# Patient Record
Sex: MALE | Race: WHITE | NOT HISPANIC OR LATINO | Employment: FULL TIME | URBAN - METROPOLITAN AREA
[De-identification: names, ages, dates, MRNs, and addresses within clinical notes are randomized per-mention and may not be internally consistent; named-entity substitution may affect disease eponyms.]

---

## 2017-01-25 PROBLEM — F41.9 ANXIETY: Status: ACTIVE | Noted: 2017-01-25

## 2017-03-07 PROBLEM — F41.9 ANXIETY: Chronic | Status: ACTIVE | Noted: 2017-01-25

## 2020-04-20 PROBLEM — F10.939 ALCOHOL WITHDRAWAL: Status: ACTIVE | Noted: 2020-04-20

## 2020-04-20 PROBLEM — E87.6 HYPOKALEMIA: Status: ACTIVE | Noted: 2020-04-20

## 2020-04-20 PROBLEM — K44.9 HIATAL HERNIA: Status: ACTIVE | Noted: 2020-04-20

## 2020-04-20 PROBLEM — F41.9 ANXIETY: Status: ACTIVE | Noted: 2020-04-20

## 2020-04-20 PROBLEM — F41.8 OTHER SPECIFIED ANXIETY DISORDERS: Chronic | Status: ACTIVE | Noted: 2017-01-25

## 2020-04-20 PROBLEM — D62 ACUTE BLOOD LOSS ANEMIA: Status: ACTIVE | Noted: 2020-04-20

## 2020-04-20 PROBLEM — K92.2 UPPER GI BLEED: Status: ACTIVE | Noted: 2020-04-20

## 2020-06-14 ENCOUNTER — HOSPITAL ENCOUNTER (EMERGENCY)
Facility: HOSPITAL | Age: 40
Discharge: HOME | End: 2020-06-14
Attending: EMERGENCY MEDICINE
Payer: COMMERCIAL

## 2020-06-14 VITALS
OXYGEN SATURATION: 98 % | RESPIRATION RATE: 20 BRPM | TEMPERATURE: 98 F | DIASTOLIC BLOOD PRESSURE: 74 MMHG | HEART RATE: 93 BPM | SYSTOLIC BLOOD PRESSURE: 139 MMHG

## 2020-06-14 DIAGNOSIS — K92.0 HEMATEMESIS: ICD-10-CM

## 2020-06-14 DIAGNOSIS — F10.939 WITHDRAWAL SYMPTOMS, ALCOHOL: ICD-10-CM

## 2020-06-14 LAB
ALBUMIN SERPL BCP-MCNC: 4.5 G/DL (ref 3.5–5.2)
ALP SERPL-CCNC: 206 U/L (ref 55–135)
ALT SERPL W/O P-5'-P-CCNC: 109 U/L (ref 10–44)
ANION GAP SERPL CALC-SCNC: 23 MMOL/L (ref 8–16)
AST SERPL-CCNC: 316 U/L (ref 10–40)
BASOPHILS # BLD AUTO: 0.07 K/UL (ref 0–0.2)
BASOPHILS NFR BLD: 0.9 % (ref 0–1.9)
BILIRUB SERPL-MCNC: 4.1 MG/DL (ref 0.1–1)
BUN SERPL-MCNC: 10 MG/DL (ref 6–30)
BUN SERPL-MCNC: 11 MG/DL (ref 6–20)
CALCIUM SERPL-MCNC: 9.3 MG/DL (ref 8.7–10.5)
CHLORIDE SERPL-SCNC: 94 MMOL/L (ref 95–110)
CHLORIDE SERPL-SCNC: 97 MMOL/L (ref 95–110)
CO2 SERPL-SCNC: 20 MMOL/L (ref 23–29)
CREAT SERPL-MCNC: 0.6 MG/DL (ref 0.5–1.4)
CREAT SERPL-MCNC: 0.7 MG/DL (ref 0.5–1.4)
DIFFERENTIAL METHOD: ABNORMAL
EOSINOPHIL # BLD AUTO: 0 K/UL (ref 0–0.5)
EOSINOPHIL NFR BLD: 0.1 % (ref 0–8)
ERYTHROCYTE [DISTWIDTH] IN BLOOD BY AUTOMATED COUNT: 16 % (ref 11.5–14.5)
EST. GFR  (AFRICAN AMERICAN): >60 ML/MIN/1.73 M^2
EST. GFR  (NON AFRICAN AMERICAN): >60 ML/MIN/1.73 M^2
ETHANOL SERPL-MCNC: 122 MG/DL
GLUCOSE SERPL-MCNC: 117 MG/DL (ref 70–110)
GLUCOSE SERPL-MCNC: 231 MG/DL (ref 70–110)
HCT VFR BLD AUTO: 34.6 % (ref 40–54)
HCT VFR BLD CALC: 32 %PCV (ref 36–54)
HGB BLD-MCNC: 10.9 G/DL (ref 14–18)
IMM GRANULOCYTES # BLD AUTO: 0.02 K/UL (ref 0–0.04)
IMM GRANULOCYTES NFR BLD AUTO: 0.3 % (ref 0–0.5)
LIPASE SERPL-CCNC: 50 U/L (ref 4–60)
LYMPHOCYTES # BLD AUTO: 0.9 K/UL (ref 1–4.8)
LYMPHOCYTES NFR BLD: 11.9 % (ref 18–48)
MAGNESIUM SERPL-MCNC: 1.7 MG/DL (ref 1.6–2.6)
MCH RBC QN AUTO: 26.9 PG (ref 27–31)
MCHC RBC AUTO-ENTMCNC: 31.5 G/DL (ref 32–36)
MCV RBC AUTO: 85 FL (ref 82–98)
MONOCYTES # BLD AUTO: 0.6 K/UL (ref 0.3–1)
MONOCYTES NFR BLD: 8.2 % (ref 4–15)
NEUTROPHILS # BLD AUTO: 5.8 K/UL (ref 1.8–7.7)
NEUTROPHILS NFR BLD: 78.6 % (ref 38–73)
NRBC BLD-RTO: 0 /100 WBC
PLATELET # BLD AUTO: 87 K/UL (ref 150–350)
PMV BLD AUTO: 9.1 FL (ref 9.2–12.9)
POC IONIZED CALCIUM: 1.02 MMOL/L (ref 1.06–1.42)
POC TCO2 (MEASURED): 23 MMOL/L (ref 23–29)
POTASSIUM BLD-SCNC: 3.4 MMOL/L (ref 3.5–5.1)
POTASSIUM SERPL-SCNC: 3.5 MMOL/L (ref 3.5–5.1)
PROT SERPL-MCNC: 9.2 G/DL (ref 6–8.4)
RBC # BLD AUTO: 4.05 M/UL (ref 4.6–6.2)
SAMPLE: ABNORMAL
SODIUM BLD-SCNC: 135 MMOL/L (ref 136–145)
SODIUM SERPL-SCNC: 137 MMOL/L (ref 136–145)
WBC # BLD AUTO: 7.41 K/UL (ref 3.9–12.7)

## 2020-06-14 PROCEDURE — 83690 ASSAY OF LIPASE: CPT

## 2020-06-14 PROCEDURE — 25000003 PHARM REV CODE 250: Performed by: PHYSICIAN ASSISTANT

## 2020-06-14 PROCEDURE — 93010 ELECTROCARDIOGRAM REPORT: CPT | Mod: ,,, | Performed by: INTERNAL MEDICINE

## 2020-06-14 PROCEDURE — 93005 ELECTROCARDIOGRAM TRACING: CPT

## 2020-06-14 PROCEDURE — 63600175 PHARM REV CODE 636 W HCPCS: Performed by: EMERGENCY MEDICINE

## 2020-06-14 PROCEDURE — 80053 COMPREHEN METABOLIC PANEL: CPT

## 2020-06-14 PROCEDURE — 99285 EMERGENCY DEPT VISIT HI MDM: CPT | Mod: ,,, | Performed by: EMERGENCY MEDICINE

## 2020-06-14 PROCEDURE — 80047 BASIC METABLC PNL IONIZED CA: CPT | Mod: 59

## 2020-06-14 PROCEDURE — 96375 TX/PRO/DX INJ NEW DRUG ADDON: CPT

## 2020-06-14 PROCEDURE — 99285 PR EMERGENCY DEPT VISIT,LEVEL V: ICD-10-PCS | Mod: ,,, | Performed by: EMERGENCY MEDICINE

## 2020-06-14 PROCEDURE — 96365 THER/PROPH/DIAG IV INF INIT: CPT

## 2020-06-14 PROCEDURE — 93010 EKG 12-LEAD: ICD-10-PCS | Mod: ,,, | Performed by: INTERNAL MEDICINE

## 2020-06-14 PROCEDURE — 25000003 PHARM REV CODE 250: Performed by: EMERGENCY MEDICINE

## 2020-06-14 PROCEDURE — 96367 TX/PROPH/DG ADDL SEQ IV INF: CPT

## 2020-06-14 PROCEDURE — 83735 ASSAY OF MAGNESIUM: CPT

## 2020-06-14 PROCEDURE — 96361 HYDRATE IV INFUSION ADD-ON: CPT

## 2020-06-14 PROCEDURE — 96366 THER/PROPH/DIAG IV INF ADDON: CPT

## 2020-06-14 PROCEDURE — 99285 EMERGENCY DEPT VISIT HI MDM: CPT | Mod: 25

## 2020-06-14 PROCEDURE — 80320 DRUG SCREEN QUANTALCOHOLS: CPT

## 2020-06-14 PROCEDURE — 85025 COMPLETE CBC W/AUTO DIFF WBC: CPT

## 2020-06-14 RX ORDER — ONDANSETRON 4 MG/1
4 TABLET, FILM COATED ORAL EVERY 6 HOURS PRN
Qty: 12 TABLET | Refills: 0 | Status: SHIPPED | OUTPATIENT
Start: 2020-06-14 | End: 2021-07-21

## 2020-06-14 RX ORDER — THIAMINE HCL 100 MG
100 TABLET ORAL
Status: COMPLETED | OUTPATIENT
Start: 2020-06-14 | End: 2020-06-14

## 2020-06-14 RX ORDER — CHLORDIAZEPOXIDE HYDROCHLORIDE 25 MG/1
50 CAPSULE, GELATIN COATED ORAL
Status: COMPLETED | OUTPATIENT
Start: 2020-06-14 | End: 2020-06-14

## 2020-06-14 RX ORDER — DIAZEPAM 10 MG/2ML
10 INJECTION INTRAMUSCULAR
Status: COMPLETED | OUTPATIENT
Start: 2020-06-14 | End: 2020-06-14

## 2020-06-14 RX ORDER — DEXTROSE MONOHYDRATE AND SODIUM CHLORIDE 5; .9 G/100ML; G/100ML
1000 INJECTION, SOLUTION INTRAVENOUS
Status: COMPLETED | OUTPATIENT
Start: 2020-06-14 | End: 2020-06-14

## 2020-06-14 RX ORDER — ONDANSETRON 2 MG/ML
4 INJECTION INTRAMUSCULAR; INTRAVENOUS
Status: COMPLETED | OUTPATIENT
Start: 2020-06-14 | End: 2020-06-14

## 2020-06-14 RX ADMIN — DIAZEPAM 10 MG: 5 INJECTION, SOLUTION INTRAMUSCULAR; INTRAVENOUS at 11:06

## 2020-06-14 RX ADMIN — ONDANSETRON HYDROCHLORIDE 4 MG: 2 SOLUTION INTRAMUSCULAR; INTRAVENOUS at 11:06

## 2020-06-14 RX ADMIN — CHLORDIAZEPOXIDE HYDROCHLORIDE 50 MG: 25 CAPSULE ORAL at 02:06

## 2020-06-14 RX ADMIN — Medication 100 MG: at 02:06

## 2020-06-14 RX ADMIN — SODIUM CHLORIDE 1000 ML: 0.9 INJECTION, SOLUTION INTRAVENOUS at 11:06

## 2020-06-14 RX ADMIN — DEXTROSE AND SODIUM CHLORIDE 1000 ML: 5; .9 INJECTION, SOLUTION INTRAVENOUS at 12:06

## 2020-06-14 RX ADMIN — THIAMINE HYDROCHLORIDE 100 MG: 100 INJECTION, SOLUTION INTRAMUSCULAR; INTRAVENOUS at 12:06

## 2020-06-14 NOTE — ED PROVIDER NOTES
Encounter Date: 6/14/2020       History     Chief Complaint   Patient presents with    Withdrawals     Pt with alcohol withdrawal.  Last drink of vodka this am.  + vomiting, shakes.     Patient is a 39-year-old male with past medical history of alcohol abuse who presents to the emergency department with complaint of alcohol withdrawal.  He reports tachycardia, nausea, 3 episodes of hematemesis, fatigue, and tremors that began yesterday. He reports drinking about one fifth of vodka daily for about 2 years now.  He reports that he would like to start the process to detox from alcohol.  He drink about half a 5th of alcohol yesterday.  Symptoms started last night.  He reports feeling very anxious.  Denies history of seizures from withdrawal in the past.  Reports that his typical withdrawal symptoms are anxiety and tremors.  He denies chest pain, shortness of breath, fevers, chills, cough, diarrhea, melena.  He has not taken medication for his symptoms at home.  Denies worsening or alleviating factors.        Review of patient's allergies indicates:  No Known Allergies  Past Medical History:   Diagnosis Date    Alcohol abuse     Anxiety     Hiatal hernia     Panic attack      Past Surgical History:   Procedure Laterality Date    ESOPHAGOGASTRODUODENOSCOPY N/A 4/21/2020    Procedure: EGD (ESOPHAGOGASTRODUODENOSCOPY);  Surgeon: Aakash Lugo MD;  Location: [facility];  Service: Endoscopy;  Laterality: N/A;    NASAL ENDOSCOPY       Family History   Problem Relation Age of Onset    No Known Problems Mother     No Known Problems Father     Cancer Maternal Grandmother         colon     Social History     Tobacco Use    Smoking status: Never Smoker    Smokeless tobacco: Never Used   Substance Use Topics    Alcohol use: Yes     Alcohol/week: 5.0 standard drinks     Types: 5 Shots of liquor per week     Frequency: Monthly or less     Drinks per session: 1 or 2     Binge frequency: Less than monthly     Comment:  pt drinks about 4-5 oz glasses of vodka/day    Drug use: Not on file     Review of Systems   Constitutional: Positive for fatigue. Negative for chills and fever.   HENT: Negative for congestion and sore throat.    Eyes: Negative for visual disturbance.   Respiratory: Negative for cough and shortness of breath.    Cardiovascular: Negative for chest pain.   Gastrointestinal: Positive for nausea and vomiting. Negative for abdominal pain and diarrhea.   Genitourinary: Negative for dysuria.   Musculoskeletal: Negative for back pain.   Skin: Negative for rash.   Neurological: Positive for weakness. Negative for headaches.   Hematological: Does not bruise/bleed easily.   Psychiatric/Behavioral: The patient is nervous/anxious.        Physical Exam     Initial Vitals [06/14/20 1035]   BP Pulse Resp Temp SpO2   (!) 161/92 (!) 120 20 98.2 °F (36.8 °C) 98 %      MAP       --         Physical Exam    Nursing note and vitals reviewed.  Constitutional: He appears well-developed and well-nourished. He is not diaphoretic. No distress.   Anxious appearing white male in no apparent distress.  Slightly tremulous upper extremities   HENT:   Head: Normocephalic and atraumatic.   Mouth/Throat: Mucous membranes are dry.   Eyes: Conjunctivae and EOM are normal. Pupils are equal, round, and reactive to light.   Neck: Normal range of motion. Neck supple.   Cardiovascular: Regular rhythm, normal heart sounds and intact distal pulses. Tachycardia present.  Exam reveals no gallop and no friction rub.    No murmur heard.  Pulmonary/Chest: Breath sounds normal. He has no wheezes. He has no rhonchi. He has no rales.   Abdominal: Soft. Bowel sounds are normal. There is no abdominal tenderness.   Musculoskeletal: Normal range of motion.   Neurological: He is alert and oriented to person, place, and time. He has normal strength. No cranial nerve deficit or sensory deficit. GCS score is 15. GCS eye subscore is 4. GCS verbal subscore is 5. GCS motor  subscore is 6.   Skin: Skin is warm and dry. Capillary refill takes less than 2 seconds.   Psychiatric: He has a normal mood and affect. His behavior is normal. Judgment and thought content normal.         ED Course   Procedures  Labs Reviewed   CBC W/ AUTO DIFFERENTIAL - Abnormal; Notable for the following components:       Result Value    RBC 4.05 (*)     Hemoglobin 10.9 (*)     Hematocrit 34.6 (*)     Mean Corpuscular Hemoglobin 26.9 (*)     Mean Corpuscular Hemoglobin Conc 31.5 (*)     RDW 16.0 (*)     Platelets 87 (*)     MPV 9.1 (*)     Lymph # 0.9 (*)     Gran% 78.6 (*)     Lymph% 11.9 (*)     All other components within normal limits   COMPREHENSIVE METABOLIC PANEL - Abnormal; Notable for the following components:    Chloride 94 (*)     CO2 20 (*)     Glucose 117 (*)     Total Protein 9.2 (*)     Total Bilirubin 4.1 (*)     Alkaline Phosphatase 206 (*)      (*)      (*)     Anion Gap 23 (*)     All other components within normal limits   ALCOHOL,MEDICAL (ETHANOL) - Abnormal; Notable for the following components:    Alcohol, Medical, Serum 122 (*)     All other components within normal limits   ISTAT PROCEDURE - Abnormal; Notable for the following components:    POC Glucose 231 (*)     POC Sodium 135 (*)     POC Potassium 3.4 (*)     POC Ionized Calcium 1.02 (*)     POC Hematocrit 32 (*)     All other components within normal limits   MAGNESIUM   LIPASE   ISTAT CHEM8     EKG Readings: (Independently Interpreted)   Initial Reading: No STEMI. Rhythm: Sinus Tachycardia. Heart Rate: 105.       Imaging Results          X-Ray Chest 1 View (Final result)  Result time 06/14/20 11:37:38    Final result by Loc Muniz MD (06/14/20 11:37:38)                 Impression:      As above.      Electronically signed by: Loc Muniz MD  Date:    06/14/2020  Time:    11:37             Narrative:    EXAMINATION:  XR CHEST 1 VIEW    CLINICAL HISTORY:  Hematemesis    TECHNIQUE:  Single frontal view of the chest  was performed.    COMPARISON:  None    FINDINGS:  No consolidation, pleural effusion or pneumothorax.  Cardiomediastinal silhouette is unremarkable.                              X-Rays:   Independently Interpreted Readings:   Chest X-Ray: Normal heart size.  No infiltrates.  No acute abnormalities.     Medical Decision Making:   History:   Old Medical Records: I decided to obtain old medical records.  Initial Assessment:   Emergent evaluation of a 39-year-old male who presents to the emergency department with complaints of alcohol withdrawal, anxiety, nausea, hematemesis, tremors that began last night.  Patient typically drinks one fifth of vodka daily however only drink a half a fifth of vodka last night.  Patient is afebrile, tachycardic, hypertensive, and anxious appearing.  Will order labs, EKG, chest x-ray, IV fluids, Valium, thiamine, Zofran and continue to monitor.  Differential Diagnosis:   Differential diagnosis includes but is not limited to Sneha-Chaudhry tear, Boerhaave syndrome, alcohol withdrawal, alcohol dependence, electrolyte abnormality, cardiac dysrhythmia, dehydration.  Independently Interpreted Test(s):   I have ordered and independently interpreted X-rays - see prior notes.  I have ordered and independently interpreted EKG Reading(s) - see prior notes  Clinical Tests:   Lab Tests: Ordered and Reviewed  Radiological Study: Ordered and Reviewed  Medical Tests: Ordered and Reviewed  ED Management:  CMP reveals elevation in total bili to 4.1 although this is near patient's baseline.  Also elevation in LFTs however the seem to be chronic.  Patient does have an anion gap of 23.  He is receiving 1 L of normal saline.  Will follow with another L of D5.  Repeat i-STAT reveals that anion gap has closed.  Patient reports significant improvement in symptoms.  He is no longer tremulous or tachycardic.  His mother was here with him on initial evaluation however has gone outside to wait with her .   Patient's parents are researching rehabilitation centers.  Our  in the emergency department has also attempted to find inpatient placement however no facilities in the area are able to take the patient until tomorrow.  I do feel that patient is stable for discharge and does not need to be admitted to the hospital at this time.  Will give a dose of Librium in the emergency department.  Will also discharge with a prescription for Zofran as needed for nausea.  Patient once voluntary admission however will do so tomorrow.  He is given strict return precautions. The patient was instructed to follow up with a primary care provider/seek voluntary admission for alcohol detox or to return to the emergency department for worsening symptoms. The treatment plan was discussed with the patient who demonstrated understanding and comfort with plan. The patient's history, physical exam, and plan of care was discussed with and agreed upon with my supervising physician.                    ED Course as of Jun 14 1518   Sun Jun 14, 2020   1205 WBC: 7.41 [JM]   1205 Hemoglobin(!): 10.9 [JM]   1205 Hematocrit(!): 34.6 [JM]   1205 Platelets(!): 87 [JM]   1205 Sodium: 137 [JM]   1205 Potassium: 3.5 [JM]   1206 Glucose(!): 117 [JM]   1206 BUN, Bld: 11 [JM]   1206 Creatinine: 0.7 [JM]   1206 Alcohol, Medical, Serum(!): 122 [JM]   1206 Lipase: 50 [JM]   1206 Magnesium: 1.7 [JM]      ED Course User Index  [JM] Linnette Palacios PA-C                Clinical Impression:     1. Withdrawal symptoms, alcohol    2. Hematemesis                ED Disposition Condition    Discharge Stable        ED Prescriptions     Medication Sig Dispense Start Date End Date Auth. Provider    ondansetron (ZOFRAN) 4 MG tablet Take 1 tablet (4 mg total) by mouth every 6 (six) hours as needed for Nausea. 12 tablet 6/14/2020  Linnette Palacios PA-C        Follow-up Information     Follow up With Specialties Details Why Contact Info    Wan Herrera II,  MD Internal Medicine Schedule an appointment as soon as possible for a visit in 3 days  [address]  [facility] [address]  [phone]      [facility] Emergency Medicine Go to  If symptoms worsen [address]  [facility] [id number] [phone]                                     Linnette Palacios PA-C  06/14/20 1516

## 2020-06-14 NOTE — DISCHARGE INSTRUCTIONS
We are unable to place you in an inpatient detox program today.  We will give you information for different programs in the area.  Take Zofran as needed for nausea.  Please check into one of these facilities for further management or return to the emergency department for new or worsening symptoms.

## 2020-06-14 NOTE — ED TRIAGE NOTES
"Reports ETOH abuse and trying to stop drinking. Last drink this am at 0900 a "glass" of vodka. Reports having tremors, elevated HR and n/v x 2 days  "

## 2020-07-30 ENCOUNTER — LAB VISIT (OUTPATIENT)
Dept: PRIMARY CARE CLINIC | Facility: OTHER | Age: 40
End: 2020-07-30
Attending: INTERNAL MEDICINE
Payer: COMMERCIAL

## 2020-07-30 DIAGNOSIS — Z11.59 SPECIAL SCREENING EXAMINATION FOR UNSPECIFIED VIRAL DISEASE: Primary | ICD-10-CM

## 2020-07-30 PROCEDURE — U0003 INFECTIOUS AGENT DETECTION BY NUCLEIC ACID (DNA OR RNA); SEVERE ACUTE RESPIRATORY SYNDROME CORONAVIRUS 2 (SARS-COV-2) (CORONAVIRUS DISEASE [COVID-19]), AMPLIFIED PROBE TECHNIQUE, MAKING USE OF HIGH THROUGHPUT TECHNOLOGIES AS DESCRIBED BY CMS-2020-01-R: HCPCS

## 2020-08-03 LAB — SARS-COV-2 RNA RESP QL NAA+PROBE: NORMAL

## 2020-08-19 ENCOUNTER — LAB VISIT (OUTPATIENT)
Dept: PRIMARY CARE CLINIC | Facility: OTHER | Age: 40
End: 2020-08-19
Attending: INTERNAL MEDICINE
Payer: COMMERCIAL

## 2020-08-19 DIAGNOSIS — Z11.59 SPECIAL SCREENING EXAMINATION FOR UNSPECIFIED VIRAL DISEASE: ICD-10-CM

## 2020-08-19 PROCEDURE — U0003 INFECTIOUS AGENT DETECTION BY NUCLEIC ACID (DNA OR RNA); SEVERE ACUTE RESPIRATORY SYNDROME CORONAVIRUS 2 (SARS-COV-2) (CORONAVIRUS DISEASE [COVID-19]), AMPLIFIED PROBE TECHNIQUE, MAKING USE OF HIGH THROUGHPUT TECHNOLOGIES AS DESCRIBED BY CMS-2020-01-R: HCPCS

## 2020-08-20 ENCOUNTER — LAB VISIT (OUTPATIENT)
Dept: PRIMARY CARE CLINIC | Facility: OTHER | Age: 40
End: 2020-08-20
Attending: INTERNAL MEDICINE
Payer: COMMERCIAL

## 2020-08-20 DIAGNOSIS — Z11.59 SPECIAL SCREENING EXAMINATION FOR UNSPECIFIED VIRAL DISEASE: Primary | ICD-10-CM

## 2020-08-20 PROCEDURE — U0003 INFECTIOUS AGENT DETECTION BY NUCLEIC ACID (DNA OR RNA); SEVERE ACUTE RESPIRATORY SYNDROME CORONAVIRUS 2 (SARS-COV-2) (CORONAVIRUS DISEASE [COVID-19]), AMPLIFIED PROBE TECHNIQUE, MAKING USE OF HIGH THROUGHPUT TECHNOLOGIES AS DESCRIBED BY CMS-2020-01-R: HCPCS

## 2020-08-21 LAB — SARS-COV-2 RNA RESP QL NAA+PROBE: NOT DETECTED

## 2020-08-22 LAB — SARS-COV-2 RNA RESP QL NAA+PROBE: NORMAL

## 2021-05-10 ENCOUNTER — PATIENT MESSAGE (OUTPATIENT)
Dept: RESEARCH | Facility: HOSPITAL | Age: 41
End: 2021-05-10

## 2021-07-19 ENCOUNTER — OFFICE VISIT (OUTPATIENT)
Dept: DERMATOLOGY | Facility: CLINIC | Age: 41
End: 2021-07-19
Payer: COMMERCIAL

## 2021-07-19 DIAGNOSIS — Z76.89 ENCOUNTER FOR SKIN CARE: Primary | ICD-10-CM

## 2021-07-19 DIAGNOSIS — L21.9 SEBORRHEA: ICD-10-CM

## 2021-07-19 PROCEDURE — 99203 OFFICE O/P NEW LOW 30 MIN: CPT | Mod: 95,,, | Performed by: DERMATOLOGY

## 2021-07-19 PROCEDURE — 99203 PR OFFICE/OUTPT VISIT, NEW, LEVL III, 30-44 MIN: ICD-10-PCS | Mod: 95,,, | Performed by: DERMATOLOGY

## 2021-07-19 RX ORDER — KETOCONAZOLE 20 MG/G
CREAM TOPICAL 2 TIMES DAILY
Qty: 45 G | Refills: 2 | Status: SHIPPED | OUTPATIENT
Start: 2021-07-19 | End: 2022-02-16 | Stop reason: ALTCHOICE

## 2023-06-23 ENCOUNTER — PATIENT MESSAGE (OUTPATIENT)
Dept: PSYCHIATRY | Facility: CLINIC | Age: 43
End: 2023-06-23
Payer: COMMERCIAL

## 2023-07-19 PROBLEM — F33.1 MODERATE EPISODE OF RECURRENT MAJOR DEPRESSIVE DISORDER: Status: ACTIVE | Noted: 2023-07-19

## 2023-07-31 ENCOUNTER — PATIENT MESSAGE (OUTPATIENT)
Dept: PSYCHIATRY | Facility: CLINIC | Age: 43
End: 2023-07-31
Payer: COMMERCIAL

## 2023-07-31 ENCOUNTER — TELEPHONE (OUTPATIENT)
Dept: PSYCHIATRY | Facility: CLINIC | Age: 43
End: 2023-07-31
Payer: COMMERCIAL

## 2023-07-31 NOTE — TELEPHONE ENCOUNTER
Called patient to schedule NP for med [facility] in [place]   No answer    Left a voicemail and sent mychart message.

## 2023-08-04 ENCOUNTER — TELEPHONE (OUTPATIENT)
Dept: PSYCHIATRY | Facility: CLINIC | Age: 43
End: 2023-08-04
Payer: COMMERCIAL

## 2023-08-04 ENCOUNTER — PATIENT MESSAGE (OUTPATIENT)
Dept: PSYCHIATRY | Facility: CLINIC | Age: 43
End: 2023-08-04
Payer: COMMERCIAL

## 2023-08-04 NOTE — PROGRESS NOTES
"Outpatient Psychiatry Initial Visit  08/08/2023    ID:  41 yo M presenting for an initial evaluation. Met with patient.    Reason for encounter: Referral from MELVIN Cramer. Patient complains of anxiety/depression.    History of Present Illness:  Pt. is a 41 yo M with a past psychiatric hx of Anxiety, Panic attack, Alcohol abuse, Moderate episode of recurrent major depressive disorder presenting to the clinic for an initial evaluation and treatment. Past medical history outlined below. He is currently taking ALPRAZolam (XANAX), paroxetine (PAXIL), prescribed and managed by Dr. Herrera. He reports that these medications have not worked to effectively treat his depression/anxiety. He reports worsening depression and has not felt that the Paxil has worked, states he has been on it for 7 years and would like to try a different medication. Was prescribed Wellbutrin recently in addition to Paxil and stated that it worsened his anxiety significantly, reported that he stopped taking it. States that the Xanax helps, does not report any panic attacks, but continues to experience s/s of anxiety. Admits that he has had anxiety a good part of his life and "deals with it", but believes that it is adding to his depression currently. Reports good sleep, "it is thankfully good, and probably the only relief I get". Denies any SI or thoughts of self harm, denies HI/AVH, denies any suicide attempts in the past or psych IP admissions. "The depression lately is what worries me the most, I have tried the online therapy but I would like to see someone for therapy to help".      Pt currently endorses or denies the following symptoms:  Psych ROS:  Depression: worsening 7 out of 10, denies SI, doesn't think his Paxil is working  Anxiety: no panic attacks, no agoraphobia, no social anxiety, chronic S/S anxiety  PTSD: no flashbacks, nightmares, or avoidance of stimuli  Melony/Psychosis: No manic episodes, no A/V hallucinations  SI - No SI - " access to guns: denies    Past Psychiatric History:  Past Psych Hx:  anxiety/depression, alcoholism First psych contact: 2000  Prior hospitalizations: denies  Prior suicide attempts or self harm: denies  Prior diagnosis: anxiety/depression  Prior meds: Wellbutrin, Ambien, Buspar (short period), Trileptal  Current meds: Paxil, Xanax  Prior psychotherapy: online last year, would like to restart      Past Medical Hx: Hx of TBI?   denies   Hx of seizures? denies  Past Medical History:   Diagnosis Date    Alcohol abuse     Anxiety     Hiatal hernia     Panic attack              Past Surgical Hx:  Past Surgical History:   Procedure Laterality Date    ESOPHAGOGASTRODUODENOSCOPY N/A 4/21/2020    Procedure: EGD (ESOPHAGOGASTRODUODENOSCOPY);  Surgeon: Aakash Lugo MD;  Location: [facility];  Service: Endoscopy;  Laterality: N/A;    NASAL ENDOSCOPY             Family Hx:   Paternal: denies  Maternal: denies            Social Hx:   Childhood: good  Marital Status:   Children: denies  Resides: alone  Occupation: [employer]  Hobbies: sports, friends  Faith: denies  Education level: Masters degree  : denies  Legal: denies    Substance Hx:  Tobacco: denies  Alcohol: denies (in recovery since 2020)  Drug use: denies  Caffeine: 1 cup a day  Rehab: 2020 alcohol  Prior/current AA: currently    Review of Symptoms  GENERAL: no weight gain/loss  SKIN: no rashes or lacerations  HEAD: no headaches  EYES: no jaundice, blindness. No exophthalmos  EARS: no dizziness, tinnitus, or hearing loss  NOSE: no changes in smell  Mouth/throat: no dyskinetic movements or obvious goiter  CHEST: no SOB, hyperventilation or cough  CARDIO: no tachycardia, bradycardia, or chest pain  ABDOMEN: no nausea, vomiting, pain, constipation, or diarrhea  URINARY:  no frequency, dysuria, or sexual dysfunction  ENDOCRINE: No polydipsia, polyuria, no cold/hot intolerance  MUSCULOSKELETAL: no joint pain/stiffness  NEUROLOGIC: no weakness or sensory  "changes, no seizures, no confusion, memory loss, or forgetfulness, no tremor or abnormal movements    Current Evaluation:  Nutritional Screening:  Considering the patient's height and weight, medications, medical history and preferences, should a referral be made to the dietitian? No  Vitals: most recent vitals signs, dated greater than 90 days prior to this appointment, were reviewed  General: age appropriate, well nourished, casually dressed, neatly groomed  MSK: muscle strength/tone : no tremor or abnormal movements. Gait/Station: no ataxic, steady    Suicide Risk Assessment:  Protective factors: age, gender, no prior attempts, no prior hospitalizations, active in recovery alcohol abuse, no psychosis, , no children, denies SI/intent/plan, seeking treatment, access to treatment, future oriented, good primary support, no access to firearms    Risks:     Patient is a low immediate and long-term risk considering risk factors    Psychiatric:  Speech: Normal rate, rhythm, volume. No latency, no pressured speech  Mood/Affect: euthymic, congruent and appropriate   Thought Process: organized, logical, linear  Thought Content: no suicidal or homicidal ideation, no A/V hallucinations, delusions or paranoia  Insight: Intact; aware of illness  Judgement: behavior is adequate to circumstances  Orientation: A&O x 4,  Memory: Intact for content of interview, 3/3 immediate, 3/3 after 3 mins. Able to recall recent and remote events.  Language: Grossly intact, no aphasias   Concentration: Spells "world" correctly forward & backwards  Knowledge/Intelligence: appropriate to age and level of education.   Spouse/Partner: Supportive    ASSESSMENT - DIAGNOSIS - GOALS:  Impression:          Pt presents to OP Psychiatry for initial evaluation and medication management of depression and anxiety. Pt states that his depression has worsened over the past several months. Wellbutrin was added, but pt stated he did not tolerate and he " "stopped taking. Stated that his anxiety worsened. Reports his chief complaint today as worsening depression and stated that he has been on Paxil for 7 years and would like to try something else. Discussed weaning off slowly over the next month and will discuss starting a new SSRI (Zoloft or Effexor). Reported Lexapro as something that he tried in the past but does not remember an adverse reaction. Pt open to trying again in the future if other trials are unsuccessful. Also reports chronic anxiety with inadequate relief of symptoms. Reported that he took Buspar about 6 years ago, does not recall the outcome, explained how the medication is used and pt would like to start again. Recently prescribed Abilify as an adjunct to his SSRI, pt is believes he not has been taking, discussed starting on a low dose while transitioning to new antidepressant. Pt reports morning anxiety, and he feels reluctant to go to work, feels anxious through out the day, but states "I have felt like this most of my life and I have learned to deal with it". Pt appears visibly anxious, fidgeting, unable to sit still, but fully engaged in assessment questions and able to complete assessment. Will place referral for OP Psychotherapy for MDD and anxiety.      Safe for outpatient tx and no acute safety concerns.  Diagnosis/Diagnoses:  - Moderate episode of recurrent major depressive disorder  - JOVANNI (generalized anxiety disorder)      Strengths/Liabilities: Patient accepts feedback & guidance. Patient is motivated for change.     Treatment Goals: Specify outcomes written in observable, behavioral terms  Anxiety: acquire relapse prevention skills, reduce physical symptoms of anxiety, reduce time spent worrying (>30 minutes/day)  Depression: Acquire relapse prevention skills, increasing energy, increasing interest in usual activities, increasing motivation, reducing excessive guilt and reducing fatigue.    Treatment Plan/Recommendations:   Medication " Management: The risks and benefits of medication were discussed with the patient.   Meds:    1) Taper off paroxetine (PAXIL) 10 MG tablet - Take 3 tablets (30 mg total) by mouth every evening for 10 days, THEN 2 tablets (20 mg total) every evening for 10 days, THEN 1 tablet (10 mg total) every evening for 10 days.    2) Plan to start another antidepressant (SSRI) at next visit.    3) Start ARIPiprazole (ABILIFY) 2 MG Tab - Take 1 tablet (2 mg total) by mouth once daily adjunct therapy for depression    4) Start busPIRone (BUSPAR) 10 MG tablet - Take 1 tablet (10 mg total) by mouth 2 (two) times daily for anxiety    5) Start hydrOXYzine pamoate (VISTARIL) 25 MG Cap - Take 1 capsule (25 mg total) by mouth daily as needed for anxiety    6) Continue ALPRAZolam (XANAX) 0.25 MG tablet - Take 1 tablet (0.25 mg total) by mouth 2 (two) times daily as needed for Anxiety        Labs: none  Return to Clinic: 30 days  Counseling time: 35 mins  Total time: 60 mins    - Patient given contact # for psychotherapists at [facility] and also instructed they may check with insurance for a list of providers.   - Call to report any worsening of symptoms or problems associated with medication  - Pt instructed to go to ER if thoughts of harming self or others arise     - Spent 60min face to face with the pt; >50% time spent in counseling   - Supportive therapy and psycho education provided  - R/B/SE's of medications discussed with the pt who expresses understanding and chooses to take medications as prescribed.   - Pt instructed to call clinic, 911 or go to nearest emergency room if sxs worsen or pt is in crisis. The pt expresses understanding.      Cheyenne Child NP  Psychiatric-Mental Health Nurse Practitioner  Department of Psychiatry    [facility] - [address] [address]  Phone:  [phone]  Fax: [phone]

## 2023-08-04 NOTE — TELEPHONE ENCOUNTER
LMOV in attempt to confirm pt new patient appointment for 8/8/23 at 10:15 am with Cheyenne Child. LM for pt to call clinic back whenever she gets a chance.

## 2023-08-08 ENCOUNTER — OFFICE VISIT (OUTPATIENT)
Dept: PSYCHIATRY | Facility: CLINIC | Age: 43
End: 2023-08-08
Payer: COMMERCIAL

## 2023-08-08 VITALS
HEART RATE: 106 BPM | DIASTOLIC BLOOD PRESSURE: 87 MMHG | SYSTOLIC BLOOD PRESSURE: 123 MMHG | BODY MASS INDEX: 31.04 KG/M2 | HEIGHT: 74 IN | WEIGHT: 241.88 LBS

## 2023-08-08 DIAGNOSIS — F33.1 MODERATE EPISODE OF RECURRENT MAJOR DEPRESSIVE DISORDER: Primary | ICD-10-CM

## 2023-08-08 DIAGNOSIS — F41.1 GAD (GENERALIZED ANXIETY DISORDER): ICD-10-CM

## 2023-08-08 PROCEDURE — 3008F PR BODY MASS INDEX (BMI) DOCUMENTED: ICD-10-PCS | Mod: CPTII,S$GLB,,

## 2023-08-08 PROCEDURE — 3008F BODY MASS INDEX DOCD: CPT | Mod: CPTII,S$GLB,,

## 2023-08-08 PROCEDURE — 3074F PR MOST RECENT SYSTOLIC BLOOD PRESSURE < 130 MM HG: ICD-10-PCS | Mod: CPTII,S$GLB,,

## 2023-08-08 PROCEDURE — 3079F PR MOST RECENT DIASTOLIC BLOOD PRESSURE 80-89 MM HG: ICD-10-PCS | Mod: CPTII,S$GLB,,

## 2023-08-08 PROCEDURE — 3079F DIAST BP 80-89 MM HG: CPT | Mod: CPTII,S$GLB,,

## 2023-08-08 PROCEDURE — 99999 PR PBB SHADOW E&M-EST. PATIENT-LVL IV: ICD-10-PCS | Mod: PBBFAC,,,

## 2023-08-08 PROCEDURE — 1160F PR REVIEW ALL MEDS BY PRESCRIBER/CLIN PHARMACIST DOCUMENTED: ICD-10-PCS | Mod: CPTII,S$GLB,,

## 2023-08-08 PROCEDURE — 99999 PR PBB SHADOW E&M-EST. PATIENT-LVL IV: CPT | Mod: PBBFAC,,,

## 2023-08-08 PROCEDURE — 1160F RVW MEDS BY RX/DR IN RCRD: CPT | Mod: CPTII,S$GLB,,

## 2023-08-08 PROCEDURE — 90792 PR PSYCHIATRIC DIAGNOSTIC EVALUATION W/MEDICAL SERVICES: ICD-10-PCS | Mod: S$GLB,,,

## 2023-08-08 PROCEDURE — 1159F MED LIST DOCD IN RCRD: CPT | Mod: CPTII,S$GLB,,

## 2023-08-08 PROCEDURE — 1159F PR MEDICATION LIST DOCUMENTED IN MEDICAL RECORD: ICD-10-PCS | Mod: CPTII,S$GLB,,

## 2023-08-08 PROCEDURE — 90792 PSYCH DIAG EVAL W/MED SRVCS: CPT | Mod: S$GLB,,,

## 2023-08-08 PROCEDURE — 3074F SYST BP LT 130 MM HG: CPT | Mod: CPTII,S$GLB,,

## 2023-08-08 RX ORDER — PAROXETINE 10 MG/1
TABLET, FILM COATED ORAL
Qty: 60 TABLET | Refills: 0 | Status: SHIPPED | OUTPATIENT
Start: 2023-08-08 | End: 2023-09-07

## 2023-08-08 RX ORDER — ARIPIPRAZOLE 2 MG/1
2 TABLET ORAL DAILY
Qty: 30 TABLET | Refills: 0 | Status: SHIPPED | OUTPATIENT
Start: 2023-08-08 | End: 2023-09-07 | Stop reason: SDUPTHER

## 2023-08-08 RX ORDER — BUSPIRONE HYDROCHLORIDE 10 MG/1
10 TABLET ORAL 2 TIMES DAILY
Qty: 60 TABLET | Refills: 0 | Status: SHIPPED | OUTPATIENT
Start: 2023-08-08 | End: 2023-09-07 | Stop reason: SDUPTHER

## 2023-08-08 RX ORDER — ALPRAZOLAM 0.25 MG/1
0.25 TABLET ORAL 2 TIMES DAILY PRN
Qty: 30 TABLET | Refills: 0 | Status: SHIPPED | OUTPATIENT
Start: 2023-08-11 | End: 2023-08-29 | Stop reason: SDUPTHER

## 2023-08-08 RX ORDER — HYDROXYZINE PAMOATE 25 MG/1
25 CAPSULE ORAL DAILY PRN
Qty: 30 CAPSULE | Refills: 0 | Status: SHIPPED | OUTPATIENT
Start: 2023-08-08 | End: 2023-09-07 | Stop reason: DRUGHIGH

## 2023-08-08 NOTE — PATIENT INSTRUCTIONS
1) Taper off paroxetine (PAXIL) 10 MG tablet - Take 3 tablets (30 mg total) by mouth every evening for 10 days, THEN 2 tablets (20 mg total) every evening for 10 days, THEN 1 tablet (10 mg total) every evening for 10 days.    2) Plan to start another antidepressant (SSRI) at next visit.    3) Start ARIPiprazole (ABILIFY) 2 MG Tab - Take 1 tablet (2 mg total) by mouth once daily adjunct therapy for depression    4) Start busPIRone (BUSPAR) 10 MG tablet - Take 1 tablet (10 mg total) by mouth 2 (two) times daily for anxiety    5) Start hydrOXYzine pamoate (VISTARIL) 25 MG Cap - Take 1 capsule (25 mg total) by mouth daily as needed for anxiety    6) Continue ALPRAZolam (XANAX) 0.25 MG tablet - Take 1 tablet (0.25 mg total) by mouth 2 (two) times daily as needed for Anxiety           dry, intact, no bleeding and no hematoma. Left chest wall

## 2023-08-11 ENCOUNTER — PATIENT MESSAGE (OUTPATIENT)
Dept: PSYCHIATRY | Facility: CLINIC | Age: 43
End: 2023-08-11
Payer: COMMERCIAL

## 2023-08-15 ENCOUNTER — PATIENT MESSAGE (OUTPATIENT)
Dept: PSYCHIATRY | Facility: CLINIC | Age: 43
End: 2023-08-15
Payer: COMMERCIAL

## 2023-08-21 ENCOUNTER — OFFICE VISIT (OUTPATIENT)
Dept: PSYCHIATRY | Facility: CLINIC | Age: 43
End: 2023-08-21
Payer: COMMERCIAL

## 2023-08-21 DIAGNOSIS — F33.1 MODERATE EPISODE OF RECURRENT MAJOR DEPRESSIVE DISORDER: ICD-10-CM

## 2023-08-21 PROCEDURE — 90791 PR PSYCHIATRIC DIAGNOSTIC EVALUATION: ICD-10-PCS | Mod: S$GLB,,, | Performed by: SOCIAL WORKER

## 2023-08-21 PROCEDURE — 99999 PR PBB SHADOW E&M-EST. PATIENT-LVL I: CPT | Mod: PBBFAC,,, | Performed by: SOCIAL WORKER

## 2023-08-21 PROCEDURE — 99999 PR PBB SHADOW E&M-EST. PATIENT-LVL I: ICD-10-PCS | Mod: PBBFAC,,, | Performed by: SOCIAL WORKER

## 2023-08-21 PROCEDURE — 90791 PSYCH DIAGNOSTIC EVALUATION: CPT | Mod: S$GLB,,, | Performed by: SOCIAL WORKER

## 2023-08-21 NOTE — PROGRESS NOTES
"Outpatient Psychotherapy Initial Visit  08/21/2023     History of Presenting Illness:    Pt is a 43 yo referred by Cheyenne Child NP for depression and anxiety.  Patient was seen, examined and chart was reviewed. Patient reviewed and agreed to informed consent and limits of confidentiality.    Hx of anxiety and depression. Has had psychotherapy in the past. Pt experiencing symptoms of depression. Reported the following: amotivation, anhedonia, decreased concentration, depressed mood , feelings of worthlessness     , and hopelessness. Appetite waxes and wanes. Sleep interrupted at times. Sometimes sleeping too much per pt.   In the last week, "feeling the cloud lift a bit." Pt experiencing anxiety. Reported the following: difficulty concentrating, feelings of losing control, racing thoughts. Difficulty being present. Self conscious being in crowd of friends and around parents when emotions are not stable.   Hx of alcohol dependency. Went to rehab 3 yrs ago . Outpt program for 3 months and inpt for 45 days.  Has a sponsor with AA and is also a sponsor himself. Noted sobriety is significant to daily practice.       CPA for [employer]   Lives alone .   Times of lonliness reported.   Friends present but difficult at times due to sobriety.      Mom and dad are present as well as 100yo grandmother.     Grief from previous break up a year ago appears to be a factor of depressive episode after further assessment. Appears to be delayed response.     Coping skills include listening to AA meetings, breathing, meditation, spending time with friends.     Engaged in rapport building, psychoeducation, and goal setting.  Pt goals are to manage depression and anxiety symptoms, discuss and process pt hx of abandonment and self worth. Pt would benefit from behavior modification, insight oriented, interactive, and supportive therapies.  Pt receptive to psychotherapy. Assisted with scheduling follow ups.  Suicidal Ideation " "and Risk:   Pt denied current or history of related symptoms: yes    Reeves-Suicide Severity Rating Scale  In the last two weeks     1. Wish to be Dead: Have you ever wished you were dead or not alive anymore, or wish to fall asleep and not wake up?: No  2. Suicidal Thoughts: Have you had any thoughts of killing yourself?: No  3. Suicidal Thoughts with Method (withoutSpecific Plan or Intent to Act): Have you been thinking about how you might kill yourself? : No  4. Suicidal Intent (without Specific Plan): Have you had these thoughts and had some intention of acting on them?: No  5. Suicide Intent with Specific Plan: Have you started to work out or worked out the details of how to kill yourself? Do you intend to carry out this plan?: No  6. Suicide Behavior Question: Have you ever done anything, started to do anything, or prepare to do anything to end your life?: No  If "Yes" to question 6: How long ago did you do any of these?: Between a week and a year ago? No        Homicidal/Violent Ideation and Risk:   Pt denied current or history of related symptoms: yes      PSYCHOSOCIAL AND ENVIRONMENTAL STRESSORS:  In recovery from alcohol use      Clinical Assessment:   Identified symptoms to address in tx: depression, anxiety, grief    Ability to adhere to plan:  cooperative    Rationale for employing these interactive techniques: Applicable to diagnosis     Diagnosis(es):   1. Moderate episode of recurrent major depressive disorder  Ambulatory referral/consult to Psychology            Plan   Treatment Goals:  Specify outcomes written in observable, behavioral terms:   Anxiety: acquiring relapse prevention skills, reducing negative automatic thoughts, and reducing physical symptoms of anxiety  Depression: increasing energy, increasing interest in usual activities, increasing motivation, increasing self-reward for positive thoughts (one/day), and reducing negative automatic thoughts    Treatment Plan/Recommendations: "   Medication Management: Continue current medications.  The treatment plan and follow up plan were reviewed with the patient.           Pt is to attend supportive psychotherapy sessions.     This author reviewed limits to confidentiality and this author's collaboration with pt's physician. Pt indicated understanding and denied any questions.    Return to Clinic: as scheduled  Counseling time: 60    -Call to report any worsening of symptoms or problems associated with medication.  - Pt instructed to go to ER if thoughts of harming self or others arise.   -Supportive therapy and psychoeducation provided  -Pt instructed to call clinic, 911 or go to nearest emergency room if sxs worsen or pt is in crisis. The pt expresses understanding.     Each patient to whom he or she provides medical services by telemedicine is:  (1) informed of the relationship between the physician and patient and the respective role of any other health care provider with respect to management of the patient; and (2) notified that he or she may decline to receive medical services by telemedicine and may withdraw from such care at any time.

## 2023-08-29 DIAGNOSIS — F41.1 GAD (GENERALIZED ANXIETY DISORDER): ICD-10-CM

## 2023-08-29 RX ORDER — ALPRAZOLAM 0.25 MG/1
0.25 TABLET ORAL 2 TIMES DAILY PRN
Qty: 30 TABLET | Refills: 0 | Status: SHIPPED | OUTPATIENT
Start: 2023-08-29 | End: 2023-09-14 | Stop reason: SDUPTHER

## 2023-08-31 NOTE — PROGRESS NOTES
Outpatient Psychiatry Follow-Up Visit    Clinical Status of Patient: Outpatient (Ambulatory)  09/07/2023    The patient location is:  [place], [place]  The patient phone number is: [phone]  Visit type: Virtual visit with synchronous audio and video  Each patient to whom he or she provides medical services by telemedicine is:  (1) informed of the relationship between the physician and patient and the respective role of any other health care provider with respect to management of the patient; and (2) notified that he or she may decline to receive medical services by telemedicine and may withdraw from such care at any time.      Chief Complaint: Pt is a 43 yo M who presents today for a follow-up. Met with patient.       Interval History and Content of Current Session:  Interim Events/Subjective Report/Content of Current Session:  follow up appointment.    Pt is a 43 yo M with past psychiatric hx of Moderate episode of recurrent major depressive disorder, JOVANNI (generalized anxiety disorder) who presents for follow up treatment.     Past Psychiatric hx:   Pt. is a 43 yo M with a past psychiatric hx of Anxiety, Panic attack, Alcohol abuse, Moderate episode of recurrent major depressive disorder presenting to the clinic for an initial evaluation and treatment. Past medical history outlined below. He is currently taking ALPRAZolam (XANAX), paroxetine (PAXIL), prescribed and managed by Dr. Herrera. He reports that these medications have not worked to effectively treat his depression/anxiety. He reports worsening depression and has not felt that the Paxil has worked, states he has been on it for 7 years and would like to try a different medication. Was prescribed Wellbutrin recently in addition to Paxil and stated that it worsened his anxiety significantly, reported that he stopped taking it. States that the Xanax helps, does not report any panic attacks, but continues to experience s/s of anxiety. Admits that he has had  "anxiety a good part of his life and "deals with it", but believes that it is adding to his depression currently. Reports good sleep, "it is thankfully good, and probably the only relief I get". Denies any SI or thoughts of self harm, denies HI/AVH, denies any suicide attempts in the past or psych IP admissions. "The depression lately is what worries me the most, I have tried the online therapy but I would like to see someone for therapy to help".    Past Medical hx:   Past Medical History:   Diagnosis Date    Alcohol abuse     Anxiety     Hiatal hernia     Panic attack         Interim hx:  Medication changes last visit:     1) Taper off paroxetine (PAXIL) 10 MG tablet - Take 3 tablets (30 mg total) by mouth every evening for 10 days, THEN 2 tablets (20 mg total) every evening for 10 days, THEN 1 tablet (10 mg total) every evening for 10 days.  2) Plan to start another antidepressant (SSRI) at next visit.  3) Start ARIPiprazole (ABILIFY) 2 MG Tab - Take 1 tablet (2 mg total) by mouth once daily adjunct therapy for depression  4) Start busPIRone (BUSPAR) 10 MG tablet - Take 1 tablet (10 mg total) by mouth 2 (two) times daily for anxiety  5) Start hydrOXYzine pamoate (VISTARIL) 25 MG Cap - Take 1 capsule (25 mg total) by mouth daily as needed for anxiety  6) Continue ALPRAZolam (XANAX) 0.25 MG tablet - Take 1 tablet (0.25 mg total) by mouth 2 (two) times daily as needed for Anxiety    Anxiety: improved  Depression: improved     Denies suicidal/homicidal ideations.  Denies hopelessness/worthlessness.    Denies auditory/visual hallucinations      Tobacco: denies  Alcohol: denies (in recovery since 2020)  Drug use: denies  Caffeine: 1 cup a day      Review of Systems   PSYCHIATRIC: Pertinent items are noted in the narrative.        CONSTITUTIONAL: weight stable        M/S: no pain today         ENT: no allergies noted today        ABD: no n/v/d     Past Medical, Family and Social History: The patient's past medical, " "family and social history have been reviewed and updated as appropriate within the electronic medical record. See encounter notes.     Medication Compliance: yes      Side effects: tolerates     Risk Parameters:  Patient reports no suicidal ideation  Patient reports no homicidal ideation  Patient reports no self-injurious behavior  Patient reports no violent behavior     Exam (detailed: at least 9 elements; comprehensive: all 15 elements)   Constitutional  Vitals:  Most recent vital signs, dated less than 90 days prior to this appointment, were reviewed.       General:  unremarkable, age appropriate, casual attire, good eye contact, good rapport       Musculoskeletal  Muscle Strength/Tone:  no flaccidity, no tremor    Gait & Station:  normal      Psychiatric                       Speech:  normal tone, normal rate, rhythm, and volume   Mood & Affect:   Euthymic, congruent, appropriate         Thought Process:   Goal directed; Linear    Associations:   intact   Thought Content:   No SI/HI, delusions, or paranoia, no AV/VH   Insight & Judgement:   Good, adequate to circumstances   Orientation:   grossly intact; alert and oriented x 4    Memory:  intact for content of interview    Language:  grossly intact, can repeat    Attention Span  : Grossly intact for content of interview   Fund of Knowledge:   intact and appropriate to age and level of education        Assessment and Diagnosis   Status/Progress: Based on the examination today, the patient's problem(s) is/are under fair control.  New problems have not been presented today. Comorbidities are not currently complicating management of the primary condition.      Impression:  Pt presents to OP Psychiatry via TeleHealth visit for routine follow-up for treatment/medication management of MDD, JOVANNI, Insomnia. Pt reports feeling "really good, and I feel like we're headed in the right direction". Pt denies withdrawal from Paxil taper, states the Abilify was well tolerated. " Reports no SE from starting Buspar and feels like his anxiety has improved significantly. C/o worsening sleep, we discussed the possibility of stopping the Paxil as the reason as it can be sedating and can help with sleep. Pt reported taking the Vistaril low dose during the day to help with anxiety but it made him sluggish. Discussed taking a higher dose at bedtime to combat insomnia, pt in agreement to try. Instructed pt on Zoloft ramp from 25 to 50 over the next week, instructions to be on the bottle. Pt reports appetite good, outlook has improved markedly. Pt denies SI/HI/AVH, self-harm, plan, or intent. Pt verbalizes understanding of medication instructions through teach back. Will reassess symptoms in 30 days.      Diagnosis:   - Moderate episode of recurrent major depressive disorder  - JOVANNI (generalized anxiety disorder)  - Insomnia, psychophysiological        Intervention/Counseling/Treatment Plan   Medication Management:      1. Stopped paroxetine (PAXIL) 10 MG tablet - Took last dose today of month long taper.     2.  Start sertraline (ZOLOFT) 50 MG tablet - Take 0.5 tablets (25 mg total) by mouth once daily for 8 days, THEN 1 tablet (50 mg total) once daily for depression/anxiety. Discussed potential for GI side effects, sexual dysfunction, mood destabilization, headaches  Discussed potential for GI side effects, sexual dysfunction, mood destabilization, headaches.     3.  Continue ARIPiprazole (ABILIFY) 2 MG Tab - Take 1 tablet (2 mg total) by mouth once daily adjunct therapy for depression.     4.  Continue busPIRone (BUSPAR) 10 MG tablet - Take 1 tablet (10 mg total) by mouth 2 (two) times daily for anxiety.    5.  Start hydrOXYzine pamoate (VISTARIL) 50 MG Cap - Take 1 capsule (50 mg total) by mouth daily as needed for insomnia.     6.  Continue ALPRAZolam (XANAX) 0.25 MG tablet - Take 1 tablet (0.25 mg total) by mouth 2 (two) times daily as needed for Anxiety. Discussed risk of decreased RT, sedation,  addictive potential, and not to mix with alcohol.      7.  Call to report any worsening of symptoms or problems with the medication. Pt instructed to go to ER with thoughts of harming self, others    8.  Patient given contact # for psychotherapists at [facility] [place] and also instructed she may check with insurance for list of providers.     Labs: none         Return to clinic:  30 days    -Spent 30min face to face with the pt; >50% time spent in counseling   -Supportive therapy and psychoeducation provided  -R/B/SE's of medications discussed with the pt who expresses understanding and chooses to take medications as prescribed.   -Pt instructed to call clinic, 911 or go to nearest emergency room if sxs worsen or pt is in   crisis. The pt expresses understanding.      Cheyenne Child NP  Psychiatric-Mental Health Nurse Practitioner  Department of Psychiatry    [facility] [place] [address] [place] [address]  Phone:  [phone]  Fax: [phone]

## 2023-09-07 ENCOUNTER — OFFICE VISIT (OUTPATIENT)
Dept: PSYCHIATRY | Facility: CLINIC | Age: 43
End: 2023-09-07
Payer: COMMERCIAL

## 2023-09-07 DIAGNOSIS — F51.04 INSOMNIA, PSYCHOPHYSIOLOGICAL: ICD-10-CM

## 2023-09-07 DIAGNOSIS — F41.1 GAD (GENERALIZED ANXIETY DISORDER): ICD-10-CM

## 2023-09-07 DIAGNOSIS — F33.1 MODERATE EPISODE OF RECURRENT MAJOR DEPRESSIVE DISORDER: Primary | ICD-10-CM

## 2023-09-07 PROCEDURE — 1160F RVW MEDS BY RX/DR IN RCRD: CPT | Mod: CPTII,95,,

## 2023-09-07 PROCEDURE — 1159F MED LIST DOCD IN RCRD: CPT | Mod: CPTII,95,,

## 2023-09-07 PROCEDURE — 99214 PR OFFICE/OUTPT VISIT, EST, LEVL IV, 30-39 MIN: ICD-10-PCS | Mod: 95,,,

## 2023-09-07 PROCEDURE — 1159F PR MEDICATION LIST DOCUMENTED IN MEDICAL RECORD: ICD-10-PCS | Mod: CPTII,95,,

## 2023-09-07 PROCEDURE — 99214 OFFICE O/P EST MOD 30 MIN: CPT | Mod: 95,,,

## 2023-09-07 PROCEDURE — 1160F PR REVIEW ALL MEDS BY PRESCRIBER/CLIN PHARMACIST DOCUMENTED: ICD-10-PCS | Mod: CPTII,95,,

## 2023-09-07 RX ORDER — SERTRALINE HYDROCHLORIDE 50 MG/1
TABLET, FILM COATED ORAL
Qty: 30 EACH | Refills: 0 | Status: SHIPPED | OUTPATIENT
Start: 2023-09-07 | End: 2023-10-06 | Stop reason: DRUGHIGH

## 2023-09-07 RX ORDER — ARIPIPRAZOLE 2 MG/1
2 TABLET ORAL DAILY
Qty: 30 TABLET | Refills: 3 | Status: SHIPPED | OUTPATIENT
Start: 2023-09-07 | End: 2023-10-03 | Stop reason: SDUPTHER

## 2023-09-07 RX ORDER — BUSPIRONE HYDROCHLORIDE 10 MG/1
10 TABLET ORAL 2 TIMES DAILY
Qty: 60 TABLET | Refills: 3 | Status: SHIPPED | OUTPATIENT
Start: 2023-09-07 | End: 2023-10-03 | Stop reason: SDUPTHER

## 2023-09-07 RX ORDER — HYDROXYZINE PAMOATE 50 MG/1
50 CAPSULE ORAL NIGHTLY PRN
Qty: 30 CAPSULE | Refills: 3 | Status: SHIPPED | OUTPATIENT
Start: 2023-09-07 | End: 2024-01-04 | Stop reason: SDUPTHER

## 2023-09-13 ENCOUNTER — OFFICE VISIT (OUTPATIENT)
Dept: PSYCHIATRY | Facility: CLINIC | Age: 43
End: 2023-09-13
Payer: COMMERCIAL

## 2023-09-13 DIAGNOSIS — F33.1 MODERATE EPISODE OF RECURRENT MAJOR DEPRESSIVE DISORDER: Primary | ICD-10-CM

## 2023-09-13 DIAGNOSIS — F41.1 GAD (GENERALIZED ANXIETY DISORDER): ICD-10-CM

## 2023-09-13 DIAGNOSIS — F51.04 INSOMNIA, PSYCHOPHYSIOLOGICAL: ICD-10-CM

## 2023-09-13 PROCEDURE — 90834 PSYTX W PT 45 MINUTES: CPT | Mod: S$GLB,,, | Performed by: SOCIAL WORKER

## 2023-09-13 PROCEDURE — 90834 PR PSYCHOTHERAPY W/PATIENT, 45 MIN: ICD-10-PCS | Mod: S$GLB,,, | Performed by: SOCIAL WORKER

## 2023-09-13 NOTE — PROGRESS NOTES
"Individual Psychotherapy (PhD/LCSW)    09/13/2023    Interim Events/Subjective Report/Content of Current Session:  follow-up appointment.    Pt is a 42 y.o. male with past psychiatric hx of  depression, anxiety and insomnia who presents for follow-up treatment.  Previous session, pt completed initial assessment. Identified goals to manage depression and anxiety symptoms, discuss and process pt hx of abandonment and self worth.   Pt stated he began reading self compassion workbook and is having various issues with the exercises. Difficulty with being with his emotions and feelings towards himself. Discussed self compassion as well as comparison towards those that "have everything I want." Discussed friends that are outgoing, have families and children, and parents.   Focused on his relationship with his father. Identified this being someone he admires and wonders if father has times of difficulty processing emotions. Encouraged pt to talk about this with father. Pt identified this being something he has never done.    Communication techniques discussed in session to increase ability to approach difficult conversations. Plan to continue to practice while in sessions.   Will continue to follow.   Pt aware to contact  for any additional needs that may occur prior to next session.  Therapeutic Intervention/Techniques: behavior modification, insight oriented, interactive, and supportive; relevant to diagnosis, patient responds to this modality    Risk Parameters:  Patient reports no suicidal ideation  Patient reports no homicidal ideation  Patient reports no self-injurious behavior  Patient reports no violent behavior    Diagnosis:   1. Moderate episode of recurrent major depressive disorder        2. JOVANNI (generalized anxiety disorder)        3. Insomnia, psychophysiological            Return to Clinic: as scheduled  Counseling time: 45  -Call to report any worsening of symptoms or problems associated with " medication.  - Pt instructed to go to ER if thoughts of harming self or others arise.   -Supportive therapy and psychoeducation provided  -Pt instructed to call clinic, 911 or go to nearest emergency room if sxs worsen or pt is in crisis. The pt expresses understanding.   Each patient to whom he or she provides medical services by telemedicine is:  (1) informed of the relationship between the physician and patient and the respective role of any other health care provider with respect to management of the patient; and (2) notified that he or she may decline to receive medical services by telemedicine and may withdraw from such care at any time.

## 2023-09-14 ENCOUNTER — PATIENT MESSAGE (OUTPATIENT)
Dept: PSYCHIATRY | Facility: CLINIC | Age: 43
End: 2023-09-14
Payer: COMMERCIAL

## 2023-09-14 DIAGNOSIS — F41.1 GAD (GENERALIZED ANXIETY DISORDER): ICD-10-CM

## 2023-09-14 DIAGNOSIS — F33.1 MODERATE EPISODE OF RECURRENT MAJOR DEPRESSIVE DISORDER: ICD-10-CM

## 2023-09-14 RX ORDER — ARIPIPRAZOLE 2 MG/1
2 TABLET ORAL DAILY
Qty: 30 TABLET | Refills: 3 | OUTPATIENT
Start: 2023-09-14 | End: 2024-01-12

## 2023-09-14 RX ORDER — ALPRAZOLAM 0.25 MG/1
0.25 TABLET ORAL 2 TIMES DAILY PRN
Qty: 30 TABLET | Refills: 0 | Status: SHIPPED | OUTPATIENT
Start: 2023-09-14 | End: 2023-09-28 | Stop reason: SDUPTHER

## 2023-09-14 NOTE — TELEPHONE ENCOUNTER
Abilify was sent over last week on 9/7/23 to [facility] pharmacy     Xanax last fill 8/29/23 with end date of 9/13/23

## 2023-09-27 ENCOUNTER — PATIENT MESSAGE (OUTPATIENT)
Dept: PSYCHIATRY | Facility: CLINIC | Age: 43
End: 2023-09-27
Payer: COMMERCIAL

## 2023-09-28 DIAGNOSIS — F41.1 GAD (GENERALIZED ANXIETY DISORDER): ICD-10-CM

## 2023-09-28 RX ORDER — ALPRAZOLAM 0.25 MG/1
0.25 TABLET ORAL 2 TIMES DAILY PRN
Qty: 30 TABLET | Refills: 0 | Status: SHIPPED | OUTPATIENT
Start: 2023-09-28 | End: 2023-10-11 | Stop reason: SDUPTHER

## 2023-09-29 ENCOUNTER — OFFICE VISIT (OUTPATIENT)
Dept: PSYCHIATRY | Facility: CLINIC | Age: 43
End: 2023-09-29
Payer: COMMERCIAL

## 2023-09-29 DIAGNOSIS — F41.1 GAD (GENERALIZED ANXIETY DISORDER): ICD-10-CM

## 2023-09-29 DIAGNOSIS — F33.1 MODERATE EPISODE OF RECURRENT MAJOR DEPRESSIVE DISORDER: Primary | ICD-10-CM

## 2023-09-29 PROCEDURE — 90834 PR PSYCHOTHERAPY W/PATIENT, 45 MIN: ICD-10-PCS | Mod: S$GLB,,, | Performed by: SOCIAL WORKER

## 2023-09-29 PROCEDURE — 90834 PSYTX W PT 45 MINUTES: CPT | Mod: S$GLB,,, | Performed by: SOCIAL WORKER

## 2023-09-29 NOTE — PROGRESS NOTES
Individual Psychotherapy (PhD/LCSW)    09/29/2023    Interim Events/Subjective Report/Content of Current Session:  follow-up appointment.    Pt is a 42 y.o. male with past psychiatric hx of  depression, anxiety and insomnia who presents for follow-up treatment.  Pt stated he has been experiencing increased anxiety per pt. Feeling tired, down, shortness of breath, muscle tension. Denied interruption with sleep.   Denied being able to understand what was triggering pt at this time.   Processed this lack of awareness. Pt able to identify feeling this way in the past and fear of another episode . Indicated when he is productive or distracted the feelings of anxiety decrease.     Communication techniques discussed in session to increase ability to approach difficult conversations. Completed cognitive model in session. Plan to continue to practice while in sessions.   Will continue to follow.   Pt aware to contact sw for any additional needs that may occur prior to next session.  Therapeutic Intervention/Techniques: behavior modification, insight oriented, interactive, and supportive; relevant to diagnosis, patient responds to this modality    Risk Parameters:  Patient reports no suicidal ideation  Patient reports no homicidal ideation  Patient reports no self-injurious behavior  Patient reports no violent behavior    Diagnosis:   1. Moderate episode of recurrent major depressive disorder        2. JOVANNI (generalized anxiety disorder)              Return to Clinic: as scheduled  Counseling time: 45  -Call to report any worsening of symptoms or problems associated with medication.  - Pt instructed to go to ER if thoughts of harming self or others arise.   -Supportive therapy and psychoeducation provided  -Pt instructed to call clinic, 911 or go to nearest emergency room if sxs worsen or pt is in crisis. The pt expresses understanding.   Each patient to whom he or she provides medical services by telemedicine is:  (1)  informed of the relationship between the physician and patient and the respective role of any other health care provider with respect to management of the patient; and (2) notified that he or she may decline to receive medical services by telemedicine and may withdraw from such care at any time.

## 2023-10-03 DIAGNOSIS — F41.1 GAD (GENERALIZED ANXIETY DISORDER): ICD-10-CM

## 2023-10-03 DIAGNOSIS — F33.1 MODERATE EPISODE OF RECURRENT MAJOR DEPRESSIVE DISORDER: ICD-10-CM

## 2023-10-03 RX ORDER — ARIPIPRAZOLE 2 MG/1
2 TABLET ORAL DAILY
Qty: 30 TABLET | Refills: 3 | Status: SHIPPED | OUTPATIENT
Start: 2023-10-03 | End: 2024-01-04 | Stop reason: DRUGHIGH

## 2023-10-03 RX ORDER — BUSPIRONE HYDROCHLORIDE 10 MG/1
10 TABLET ORAL 2 TIMES DAILY
Qty: 60 TABLET | Refills: 3 | Status: SHIPPED | OUTPATIENT
Start: 2023-10-03 | End: 2023-10-06 | Stop reason: DRUGHIGH

## 2023-10-04 ENCOUNTER — PATIENT MESSAGE (OUTPATIENT)
Dept: PSYCHIATRY | Facility: CLINIC | Age: 43
End: 2023-10-04
Payer: COMMERCIAL

## 2023-10-04 NOTE — PROGRESS NOTES
Outpatient Psychiatry Follow-Up Visit    Clinical Status of Patient: Outpatient (Ambulatory)  10/06/2023    The patient location is:  [place], [place]  The patient phone number is: [phone]  Visit type: Virtual visit with synchronous audio and video  Each patient to whom he or she provides medical services by telemedicine is:  (1) informed of the relationship between the physician and patient and the respective role of any other health care provider with respect to management of the patient; and (2) notified that he or she may decline to receive medical services by telemedicine and may withdraw from such care at any time.      Chief Complaint: Pt is a 43 yo M who presents today for a follow-up. Met with patient.       Interval History and Content of Current Session:  Interim Events/Subjective Report/Content of Current Session:  follow up appointment.    Pt is a 43 yo M with past psychiatric hx of Moderate episode of recurrent major depressive disorder, JOVANNI (generalized anxiety disorder), Insomnia, psychophysiological who presents for follow up treatment.     Past Psychiatric hx:   Pt. is a 43 yo M with a past psychiatric hx of Anxiety, Panic attack, Alcohol abuse, Moderate episode of recurrent major depressive disorder presenting to the clinic for an initial evaluation and treatment. Past medical history outlined below. He is currently taking ALPRAZolam (XANAX), paroxetine (PAXIL), prescribed and managed by Dr. Herrera. He reports that these medications have not worked to effectively treat his depression/anxiety. He reports worsening depression and has not felt that the Paxil has worked, states he has been on it for 7 years and would like to try a different medication. Was prescribed Wellbutrin recently in addition to Paxil and stated that it worsened his anxiety significantly, reported that he stopped taking it. States that the Xanax helps, does not report any panic attacks, but continues to experience s/s of  "anxiety. Admits that he has had anxiety a good part of his life and "deals with it", but believes that it is adding to his depression currently. Reports good sleep, "it is thankfully good, and probably the only relief I get". Denies any SI or thoughts of self harm, denies HI/AVH, denies any suicide attempts in the past or psych IP admissions. "The depression lately is what worries me the most, I have tried the online therapy but I would like to see someone for therapy to help".    Pt presents to OP Psychiatry via TeleHealth visit for routine follow-up for treatment/medication management of MDD, JOVANNI, Insomnia. Pt reports feeling "really good, and I feel like we're headed in the right direction". Pt denies withdrawal from Paxil taper, states the Abilify was well tolerated. Reports no SE from starting Buspar and feels like his anxiety has improved significantly. C/o worsening sleep, we discussed the possibility of stopping the Paxil as the reason as it can be sedating and can help with sleep. Pt reported taking the Vistaril low dose during the day to help with anxiety but it made him sluggish. Discussed taking a higher dose at bedtime to combat insomnia, pt in agreement to try. Instructed pt on Zoloft ramp from 25 to 50 over the next week, instructions to be on the bottle. Pt reports appetite good, outlook has improved markedly. Pt denies SI/HI/AVH, self-harm, plan, or intent. Pt verbalizes understanding of medication instructions through teach back. Will reassess symptoms in 30 days.    Past Medical hx:   Past Medical History:   Diagnosis Date    Alcohol abuse     Anxiety     Hiatal hernia     Panic attack         Interim hx:  Medication changes last visit:     1. Stopped paroxetine (PAXIL) 10 MG tablet - Took last dose today of month long taper.  2.  Start sertraline (ZOLOFT) 50 MG tablet - Take 0.5 tablets (25 mg total) by mouth once daily for 8 days, THEN 1 tablet (50 mg total) once daily for depression/anxiety. " "Discussed potential for GI side effects, sexual dysfunction, mood destabilization, headaches  Discussed potential for GI side effects, sexual dysfunction, mood destabilization, headaches.   3.  Continue ARIPiprazole (ABILIFY) 2 MG Tab - Take 1 tablet (2 mg total) by mouth once daily adjunct therapy for depression.  4.  Continue busPIRone (BUSPAR) 10 MG tablet - Take 1 tablet (10 mg total) by mouth 2 (two) times daily for anxiety.  5.  Start hydrOXYzine pamoate (VISTARIL) 50 MG Cap - Take 1 capsule (50 mg total) by mouth daily as needed for insomnia.  6.  Continue ALPRAZolam (XANAX) 0.25 MG tablet - Take 1 tablet (0.25 mg total) by mouth 2 (two) times daily as needed for Anxiety. Discussed risk of decreased RT, sedation, addictive potential, and not to mix with alcohol.      Anxiety: worsening  Depression:  improved  Sleep: improved  Appetite: same "too good sometimes"     Denies suicidal/homicidal ideations.  Denies hopelessness/worthlessness.    Denies auditory/visual hallucinations      Tobacco: denies  Alcohol: denies (in recovery since 2020)  Drug use: denies  Caffeine: 1 cup a day      Review of Systems   PSYCHIATRIC: Pertinent items are noted in the narrative.        CONSTITUTIONAL: weight stable        M/S: no pain today         ENT: no allergies noted today        ABD: no n/v/d     Past Medical, Family and Social History: The patient's past medical, family and social history have been reviewed and updated as appropriate within the electronic medical record. See encounter notes.     Medication Compliance: yes      Side effects: tolerates     Risk Parameters:  Patient reports no suicidal ideation  Patient reports no homicidal ideation  Patient reports no self-injurious behavior  Patient reports no violent behavior     Exam (detailed: at least 9 elements; comprehensive: all 15 elements)   Constitutional  Vitals:  Most recent vital signs, dated less than 90 days prior to this appointment, were reviewed.   "   General:  unremarkable, age appropriate, casual attire, good eye contact, good rapport       Musculoskeletal  Muscle Strength/Tone:  no flaccidity, no tremor    Gait & Station:  normal      Psychiatric                       Speech:  normal tone, normal rate, rhythm, and volume   Mood & Affect:   Euthymic, congruent, appropriate         Thought Process:   Goal directed; Linear    Associations:   intact   Thought Content:   No SI/HI, delusions, or paranoia, no AV/VH   Insight & Judgement:   Good, adequate to circumstances   Orientation:   grossly intact; alert and oriented x 4    Memory:  intact for content of interview    Language:  grossly intact, can repeat    Attention Span  : Grossly intact for content of interview   Fund of Knowledge:   intact and appropriate to age and level of education        Assessment and Diagnosis   Status/Progress: Based on the examination today, the patient's problem(s) is/are under fair control.  New problems have not been presented today. Comorbidities are not currently complicating management of the primary condition.      Impression:  Pt presents to OP Psychiatry for routine follow-up for treatment/medication management of MDD, JOVANNI, Insomnia. Pt reports worsening anxiety since coming off Paxil. Discussed starting doses of Buspar and Zoloft are minimal and we could increase both. Pt in agreement. Pt reports depression and sleep have improved. Pt denies any other issues or concerns. Pt denies SI/HI/AVH, self-harm, plan, or intent. Pt verbalizes understanding of medication instructions through teach back. No other issues, concerns, or problems, will reassess symptoms and medications in 30 days.      Diagnosis:   - Moderate episode of recurrent major depressive disorder  - JOVANNI (generalized anxiety disorder)  - Insomnia, psychophysiological      Intervention/Counseling/Treatment Plan   Medication Management:      1. Increase sertraline (ZOLOFT) 100 MG tablet - Take 1 tablet (100 mg  total) by mouth once daily for depression/anxiety. Discussed potential for GI side effects, sexual dysfunction, mood destabilization, headaches  Discussed potential for GI side effects, sexual dysfunction, mood destabilization, headaches.      2. Continue ARIPiprazole (ABILIFY) 2 MG Tab - Take 1 tablet (2 mg total) by mouth once daily adjunct therapy for depression.     3. Increase busPIRone (BUSPAR) 10 MG tablet - Take 2 tablets (20 mg total) by mouth 2 (two) times daily for anxiety.     4. Continue hydrOXYzine pamoate (VISTARIL) 50 MG Cap - Take 1 capsule (50 mg total) by mouth daily as needed for insomnia.     5. Continue ALPRAZolam (XANAX) 0.25 MG tablet - Take 1 tablet (0.25 mg total) by mouth 2 (two) times daily as needed for Anxiety. Discussed risk of decreased RT, sedation, addictive potential, and not to mix with alcohol.      6. Call to report any worsening of symptoms or problems with the medication. Pt instructed to go to ER with thoughts of harming self, others.    7. Patient given contact # for psychotherapists at [facility] and also instructed she may check with insurance for list of providers.     Labs: none         Return to clinic:      30 days    -Spent 30min face to face with the pt; >50% time spent in counseling   -Supportive therapy and psychoeducation provided  -R/B/SE's of medications discussed with the pt who expresses understanding and chooses to take medications as prescribed.   -Pt instructed to call clinic, 911 or go to nearest emergency room if sxs worsen or pt is in   crisis. The pt expresses understanding.      Cheyenne Child NP  Psychiatric-Mental Health Nurse Practitioner  Department of Psychiatry    [facility] [place] [address] [place] [address]  Phone:  [phone]  Fax: [phone]

## 2023-10-06 ENCOUNTER — OFFICE VISIT (OUTPATIENT)
Dept: PSYCHIATRY | Facility: CLINIC | Age: 43
End: 2023-10-06
Payer: COMMERCIAL

## 2023-10-06 DIAGNOSIS — F41.1 GAD (GENERALIZED ANXIETY DISORDER): Primary | ICD-10-CM

## 2023-10-06 DIAGNOSIS — F51.04 INSOMNIA, PSYCHOPHYSIOLOGICAL: ICD-10-CM

## 2023-10-06 DIAGNOSIS — F33.1 MODERATE EPISODE OF RECURRENT MAJOR DEPRESSIVE DISORDER: ICD-10-CM

## 2023-10-06 PROCEDURE — 1160F PR REVIEW ALL MEDS BY PRESCRIBER/CLIN PHARMACIST DOCUMENTED: ICD-10-PCS | Mod: CPTII,95,,

## 2023-10-06 PROCEDURE — 99214 OFFICE O/P EST MOD 30 MIN: CPT | Mod: 95,,,

## 2023-10-06 PROCEDURE — 1159F PR MEDICATION LIST DOCUMENTED IN MEDICAL RECORD: ICD-10-PCS | Mod: CPTII,95,,

## 2023-10-06 PROCEDURE — 1159F MED LIST DOCD IN RCRD: CPT | Mod: CPTII,95,,

## 2023-10-06 PROCEDURE — 99214 PR OFFICE/OUTPT VISIT, EST, LEVL IV, 30-39 MIN: ICD-10-PCS | Mod: 95,,,

## 2023-10-06 PROCEDURE — 1160F RVW MEDS BY RX/DR IN RCRD: CPT | Mod: CPTII,95,,

## 2023-10-06 RX ORDER — BUSPIRONE HYDROCHLORIDE 10 MG/1
20 TABLET ORAL 2 TIMES DAILY
Qty: 120 TABLET | Refills: 3 | Status: SHIPPED | OUTPATIENT
Start: 2023-10-06 | End: 2023-11-06 | Stop reason: DRUGHIGH

## 2023-10-06 RX ORDER — SERTRALINE HYDROCHLORIDE 100 MG/1
100 TABLET, FILM COATED ORAL DAILY
Qty: 30 TABLET | Refills: 3 | Status: SHIPPED | OUTPATIENT
Start: 2023-10-06 | End: 2024-01-04 | Stop reason: DRUGHIGH

## 2023-10-11 DIAGNOSIS — F41.1 GAD (GENERALIZED ANXIETY DISORDER): ICD-10-CM

## 2023-10-11 RX ORDER — ALPRAZOLAM 0.25 MG/1
0.25 TABLET ORAL 2 TIMES DAILY PRN
Qty: 30 TABLET | Refills: 0 | Status: SHIPPED | OUTPATIENT
Start: 2023-10-11 | End: 2023-10-24 | Stop reason: SDUPTHER

## 2023-10-24 DIAGNOSIS — F41.1 GAD (GENERALIZED ANXIETY DISORDER): ICD-10-CM

## 2023-10-24 RX ORDER — ALPRAZOLAM 0.25 MG/1
0.25 TABLET ORAL 2 TIMES DAILY PRN
Qty: 30 TABLET | Refills: 0 | Status: SHIPPED | OUTPATIENT
Start: 2023-10-25 | End: 2023-11-06 | Stop reason: SDUPTHER

## 2023-11-02 ENCOUNTER — PATIENT MESSAGE (OUTPATIENT)
Dept: PSYCHIATRY | Facility: CLINIC | Age: 43
End: 2023-11-02
Payer: COMMERCIAL

## 2023-11-03 NOTE — PROGRESS NOTES
Outpatient Psychiatry Follow-Up Visit    Clinical Status of Patient: Outpatient (Ambulatory)  11/06/2023    The patient location is:  [place], [place]  The patient phone number is: [phone]  Visit type: Virtual visit with synchronous audio and video  Each patient to whom he or she provides medical services by telemedicine is:  (1) informed of the relationship between the physician and patient and the respective role of any other health care provider with respect to management of the patient; and (2) notified that he or she may decline to receive medical services by telemedicine and may withdraw from such care at any time.      Chief Complaint: Pt is a 43 yo M who presents today for a follow-up. Met with patient.       Interval History and Content of Current Session:  Interim Events/Subjective Report/Content of Current Session:  follow up appointment.    Pt is a 43 yo M with past psychiatric hx of Moderate episode of recurrent major depressive disorder, JOVANNI (generalized anxiety disorder), Insomnia, psychophysiological who presents for follow up treatment.     Past Psychiatric hx:   Pt. is a 43 yo M with a past psychiatric hx of Anxiety, Panic attack, Alcohol abuse, Moderate episode of recurrent major depressive disorder presenting to the clinic for an initial evaluation and treatment. Past medical history outlined below. He is currently taking ALPRAZolam (XANAX), paroxetine (PAXIL), prescribed and managed by Dr. Herrera. He reports that these medications have not worked to effectively treat his depression/anxiety. He reports worsening depression and has not felt that the Paxil has worked, states he has been on it for 7 years and would like to try a different medication. Was prescribed Wellbutrin recently in addition to Paxil and stated that it worsened his anxiety significantly, reported that he stopped taking it. States that the Xanax helps, does not report any panic attacks, but continues to experience s/s of  "anxiety. Admits that he has had anxiety a good part of his life and "deals with it", but believes that it is adding to his depression currently. Reports good sleep, "it is thankfully good, and probably the only relief I get". Denies any SI or thoughts of self harm, denies HI/AVH, denies any suicide attempts in the past or psych IP admissions. "The depression lately is what worries me the most, I have tried the online therapy but I would like to see someone for therapy to help".    Pt presents to OP Psychiatry for routine follow-up for treatment/medication management of MDD, JOVANNI, Insomnia. Pt reports worsening anxiety since coming off Paxil. Discussed starting doses of Buspar and Zoloft are minimal and we could increase both. Pt in agreement. Pt reports depression and sleep have improved. Pt denies any other issues or concerns. Pt denies SI/HI/AVH, self-harm, plan, or intent. Pt verbalizes understanding of medication instructions through teach back. No other issues, concerns, or problems, will reassess symptoms and medications in 30 days.    Past Medical hx:   Past Medical History:   Diagnosis Date    Alcohol abuse     Anxiety     Hiatal hernia     Panic attack         Interim hx:  Medication changes last visit:     1. Increase sertraline (ZOLOFT) 100 MG tablet - Take 1 tablet (100 mg total) by mouth once daily for depression/anxiety. Discussed potential for GI side effects, sexual dysfunction, mood destabilization, headaches  Discussed potential for GI side effects, sexual dysfunction, mood destabilization, headaches.   2. Continue ARIPiprazole (ABILIFY) 2 MG Tab - Take 1 tablet (2 mg total) by mouth once daily adjunct therapy for depression.  3. Increase busPIRone (BUSPAR) 10 MG tablet - Take 2 tablets (20 mg total) by mouth 2 (two) times daily for anxiety.  4. Continue hydrOXYzine pamoate (VISTARIL) 50 MG Cap - Take 1 capsule (50 mg total) by mouth daily as needed for insomnia.  5. Continue ALPRAZolam (XANAX) 0.25 " MG tablet - Take 1 tablet (0.25 mg total) by mouth 2 (two) times daily as needed for Anxiety. Discussed risk of decreased RT, sedation, addictive potential, and not to mix with alcohol.      Anxiety:  same no improvement  Depression:  Improved  Sleep: good no issues  Appetite:  same no issues     Denies suicidal/homicidal ideations.  Denies hopelessness/worthlessness.    Denies auditory/visual hallucinations      Tobacco: denies  Alcohol: denies (in recovery since 2020)  Drug use: denies  Caffeine: 1 cup a day      Review of Systems   PSYCHIATRIC: Pertinent items are noted in the narrative.        CONSTITUTIONAL: weight stable        M/S: no pain today         ENT: no allergies noted today        ABD: no n/v/d     Past Medical, Family and Social History: The patient's past medical, family and social history have been reviewed and updated as appropriate within the electronic medical record. See encounter notes.     Medication Compliance: yes      Side effects: tolerates     Risk Parameters:  Patient reports no suicidal ideation  Patient reports no homicidal ideation  Patient reports no self-injurious behavior  Patient reports no violent behavior     Exam (detailed: at least 9 elements; comprehensive: all 15 elements)   Constitutional  Vitals:  Most recent vital signs, dated less than 90 days prior to this appointment, were reviewed.     General:  unremarkable, age appropriate, casual attire, good eye contact, good rapport       Musculoskeletal  Muscle Strength/Tone:  no flaccidity, no tremor    Gait & Station:  normal      Psychiatric                       Speech:  normal tone, normal rate, rhythm, and volume   Mood & Affect:   Euthymic, congruent, appropriate         Thought Process:   Goal directed; Linear    Associations:   intact   Thought Content:   No SI/HI, delusions, or paranoia, no AV/VH   Insight & Judgement:   Good, adequate to circumstances   Orientation:   grossly intact; alert and oriented x 4     Memory:  intact for content of interview    Language:  grossly intact, can repeat    Attention Span  : Grossly intact for content of interview   Fund of Knowledge:   intact and appropriate to age and level of education        Assessment and Diagnosis   Status/Progress: Based on the examination today, the patient's problem(s) is/are under fair control.  New problems have not been presented today. Comorbidities are not currently complicating management of the primary condition.      Impression:  Pt presents to OP Psychiatry for routine follow-up for treatment/medication management of MDD, JOVANNI, Insomnia. Pt reports symptoms of depression have greatly improved after increase of Zoloft. Still c/o symptoms of anxiety that have not improved, discussed increasing Buspar, pt in agreement to try. Pt stated he was going away for Thanksgiving and requested 30 day Rx of Xanax (pt currently gets 15 day refills) because he will be out of town. Pt reaffirmed that pt takes his medications as prescribed with no concern for refilling at 30 day intervals. Pt denies SI/HI/AVH, self-harm, plan, or intent. Pt verbalizes understanding of medication instructions through teach back. No other issues, concerns, or problems, will reassess symptoms and medications in 30 days.      Diagnosis:   - Moderate episode of recurrent major depressive disorder  - JOVANNI (generalized anxiety disorder)  - Insomnia, psychophysiological      Intervention/Counseling/Treatment Plan   Medication Management:      1. Continue sertraline (ZOLOFT) 100 MG tablet - Take 1 tablet (100 mg total) by mouth once daily for depression/anxiety. Discussed potential for GI side effects, sexual dysfunction, mood destabilization, headaches  Discussed potential for GI side effects, sexual dysfunction, mood destabilization, headaches.      2. Continue ARIPiprazole (ABILIFY) 2 MG Tab - Take 1 tablet (2 mg total) by mouth once daily adjunct therapy for depression.     3. Increase  busPIRone (BUSPAR) 30 MG tablet - Take 1 tablet (30 mg total) by mouth 2 (two) times daily for anxiety.     4. Continue hydrOXYzine pamoate (VISTARIL) 50 MG Cap - Take 1 capsule (50 mg total) by mouth daily as needed for insomnia.     5. Continue ALPRAZolam (XANAX) 0.25 MG tablet - Take 1 tablet (0.25 mg total) by mouth 2 (two) times daily as needed for Anxiety. Discussed risk of decreased RT, sedation, addictive potential, and not to mix with alcohol.       6. Call to report any worsening of symptoms or problems with the medication. Pt instructed to go to ER with thoughts of harming self, others.     7. Patient given contact # for psychotherapists at [facility] and also instructed she may check with insurance for list of providers.          Labs: none         Return to clinic:      30 days    -Spent 30min face to face with the pt; >50% time spent in counseling   -Supportive therapy and psychoeducation provided  -R/B/SE's of medications discussed with the pt who expresses understanding and chooses to take medications as prescribed.   -Pt instructed to call clinic, 911 or go to nearest emergency room if sxs worsen or pt is in   crisis. The pt expresses understanding.      Cheyenne Child NP  Psychiatric-Mental Health Nurse Practitioner  Department of Psychiatry    [facility] [place] [address] [place] [address]  Phone:  [phone]  Fax: [phone]

## 2023-11-06 ENCOUNTER — OFFICE VISIT (OUTPATIENT)
Dept: PSYCHIATRY | Facility: CLINIC | Age: 43
End: 2023-11-06
Payer: COMMERCIAL

## 2023-11-06 DIAGNOSIS — F51.04 INSOMNIA, PSYCHOPHYSIOLOGICAL: ICD-10-CM

## 2023-11-06 DIAGNOSIS — F33.1 MODERATE EPISODE OF RECURRENT MAJOR DEPRESSIVE DISORDER: Primary | ICD-10-CM

## 2023-11-06 DIAGNOSIS — F41.1 GAD (GENERALIZED ANXIETY DISORDER): ICD-10-CM

## 2023-11-06 PROCEDURE — 1159F PR MEDICATION LIST DOCUMENTED IN MEDICAL RECORD: ICD-10-PCS | Mod: CPTII,95,,

## 2023-11-06 PROCEDURE — 99214 OFFICE O/P EST MOD 30 MIN: CPT | Mod: 95,,,

## 2023-11-06 PROCEDURE — 1160F PR REVIEW ALL MEDS BY PRESCRIBER/CLIN PHARMACIST DOCUMENTED: ICD-10-PCS | Mod: CPTII,95,,

## 2023-11-06 PROCEDURE — 1159F MED LIST DOCD IN RCRD: CPT | Mod: CPTII,95,,

## 2023-11-06 PROCEDURE — 99214 PR OFFICE/OUTPT VISIT, EST, LEVL IV, 30-39 MIN: ICD-10-PCS | Mod: 95,,,

## 2023-11-06 PROCEDURE — 1160F RVW MEDS BY RX/DR IN RCRD: CPT | Mod: CPTII,95,,

## 2023-11-06 RX ORDER — ALPRAZOLAM 0.25 MG/1
0.25 TABLET ORAL 2 TIMES DAILY PRN
Qty: 60 TABLET | Refills: 0 | Status: SHIPPED | OUTPATIENT
Start: 2023-11-07 | End: 2023-12-07 | Stop reason: SDUPTHER

## 2023-11-06 RX ORDER — BUSPIRONE HYDROCHLORIDE 30 MG/1
30 TABLET ORAL 2 TIMES DAILY
Qty: 60 TABLET | Refills: 1 | Status: SHIPPED | OUTPATIENT
Start: 2023-11-06 | End: 2024-01-04 | Stop reason: SDUPTHER

## 2023-11-08 ENCOUNTER — PATIENT MESSAGE (OUTPATIENT)
Dept: PSYCHIATRY | Facility: CLINIC | Age: 43
End: 2023-11-08
Payer: COMMERCIAL

## 2023-12-07 ENCOUNTER — TELEPHONE (OUTPATIENT)
Dept: PSYCHIATRY | Facility: CLINIC | Age: 43
End: 2023-12-07
Payer: COMMERCIAL

## 2023-12-07 ENCOUNTER — PATIENT MESSAGE (OUTPATIENT)
Dept: PSYCHIATRY | Facility: CLINIC | Age: 43
End: 2023-12-07
Payer: COMMERCIAL

## 2023-12-07 DIAGNOSIS — F41.1 GAD (GENERALIZED ANXIETY DISORDER): ICD-10-CM

## 2023-12-07 RX ORDER — ALPRAZOLAM 0.25 MG/1
0.25 TABLET ORAL 2 TIMES DAILY PRN
Qty: 60 TABLET | Refills: 0 | Status: SHIPPED | OUTPATIENT
Start: 2023-12-07 | End: 2024-01-04 | Stop reason: SDUPTHER

## 2024-01-04 ENCOUNTER — OFFICE VISIT (OUTPATIENT)
Dept: PSYCHIATRY | Facility: CLINIC | Age: 44
End: 2024-01-04
Payer: COMMERCIAL

## 2024-01-04 DIAGNOSIS — F33.1 MODERATE EPISODE OF RECURRENT MAJOR DEPRESSIVE DISORDER: Primary | ICD-10-CM

## 2024-01-04 DIAGNOSIS — F41.1 GAD (GENERALIZED ANXIETY DISORDER): ICD-10-CM

## 2024-01-04 DIAGNOSIS — F51.04 INSOMNIA, PSYCHOPHYSIOLOGICAL: ICD-10-CM

## 2024-01-04 PROCEDURE — 99214 OFFICE O/P EST MOD 30 MIN: CPT | Mod: 95,,,

## 2024-01-04 PROCEDURE — 1159F MED LIST DOCD IN RCRD: CPT | Mod: CPTII,95,,

## 2024-01-04 PROCEDURE — 1160F RVW MEDS BY RX/DR IN RCRD: CPT | Mod: CPTII,95,,

## 2024-01-04 RX ORDER — BUSPIRONE HYDROCHLORIDE 30 MG/1
30 TABLET ORAL 2 TIMES DAILY
Qty: 180 TABLET | Refills: 1 | Status: SHIPPED | OUTPATIENT
Start: 2024-01-04 | End: 2024-07-02

## 2024-01-04 RX ORDER — ALPRAZOLAM 0.25 MG/1
0.25 TABLET ORAL 2 TIMES DAILY PRN
Qty: 60 TABLET | Refills: 0 | Status: SHIPPED | OUTPATIENT
Start: 2024-01-04 | End: 2024-02-01 | Stop reason: SDUPTHER

## 2024-01-04 RX ORDER — ARIPIPRAZOLE 5 MG/1
5 TABLET ORAL NIGHTLY
Qty: 90 TABLET | Refills: 1 | Status: SHIPPED | OUTPATIENT
Start: 2024-01-04 | End: 2024-07-02

## 2024-01-04 RX ORDER — SERTRALINE HYDROCHLORIDE 100 MG/1
150 TABLET, FILM COATED ORAL NIGHTLY
Qty: 135 TABLET | Refills: 1 | Status: SHIPPED | OUTPATIENT
Start: 2024-01-04 | End: 2024-07-02

## 2024-01-04 RX ORDER — HYDROXYZINE PAMOATE 50 MG/1
50 CAPSULE ORAL NIGHTLY PRN
Qty: 90 CAPSULE | Refills: 1 | Status: SHIPPED | OUTPATIENT
Start: 2024-01-04 | End: 2024-07-02

## 2024-01-04 NOTE — PROGRESS NOTES
Outpatient Psychiatry Follow-Up Visit    Clinical Status of Patient: Outpatient (Ambulatory)  01/04/2024    The patient location is:  [place], [place]  The patient phone number is: [phone]   Visit type: Virtual visit with synchronous audio and video  Each patient to whom he or she provides medical services by telemedicine is:  (1) informed of the relationship between the physician and patient and the respective role of any other health care provider with respect to management of the patient; and (2) notified that he or she may decline to receive medical services by telemedicine and may withdraw from such care at any time.     Chief Complaint: Pt is a 44 yo M who presents today for a follow-up. Met with patient.       Interval History and Content of Current Session:  Interim Events/Subjective Report/Content of Current Session:  follow up appointment.    Pt is a 44 yo M with past psychiatric hx of Moderate episode of recurrent major depressive disorder, JOVANNI (generalized anxiety disorder), Insomnia, psychophysiological who presents for follow up treatment.     Past Psychiatric hx:   Pt. is a 43 yo M with a past psychiatric hx of Anxiety, Panic attack, Alcohol abuse, Moderate episode of recurrent major depressive disorder presenting to the clinic for an initial evaluation and treatment. Past medical history outlined below. He is currently taking ALPRAZolam (XANAX), paroxetine (PAXIL), prescribed and managed by Dr. Herrera. He reports that these medications have not worked to effectively treat his depression/anxiety. He reports worsening depression and has not felt that the Paxil has worked, states he has been on it for 7 years and would like to try a different medication. Was prescribed Wellbutrin recently in addition to Paxil and stated that it worsened his anxiety significantly, reported that he stopped taking it. States that the Xanax helps, does not report any panic attacks, but continues to experience s/s of  "anxiety. Admits that he has had anxiety a good part of his life and "deals with it", but believes that it is adding to his depression currently. Reports good sleep, "it is thankfully good, and probably the only relief I get". Denies any SI or thoughts of self harm, denies HI/AVH, denies any suicide attempts in the past or psych IP admissions. "The depression lately is what worries me the most, I have tried the online therapy but I would like to see someone for therapy to help".     Pt presents to OP Psychiatry for routine follow-up for treatment/medication management of MDD, JOVANNI, Insomnia. Pt reports symptoms of depression have greatly improved after increase of Zoloft. Still c/o symptoms of anxiety that have not improved, discussed increasing Buspar, pt in agreement to try. Pt stated he was going away for Thanksgiving and requested 30 day Rx of Xanax (pt currently gets 15 day refills) because he will be out of town. Pt reaffirmed that pt takes his medications as prescribed with no concern for refilling at 30 day intervals. Pt denies SI/HI/AVH, self-harm, plan, or intent. Pt verbalizes understanding of medication instructions through teach back. No other issues, concerns, or problems, will reassess symptoms and medications in 30 days.     Past Medical hx:   Past Medical History:   Diagnosis Date    Alcohol abuse     Anxiety     Hiatal hernia     Panic attack         Interim hx:  Medication changes last visit:     1. Continue sertraline (ZOLOFT) 100 MG tablet - Take 1 tablet (100 mg total) by mouth once daily for depression/anxiety. Discussed potential for GI side effects, sexual dysfunction, mood destabilization, headaches  Discussed potential for GI side effects, sexual dysfunction, mood destabilization, headaches.   2. Continue ARIPiprazole (ABILIFY) 2 MG Tab - Take 1 tablet (2 mg total) by mouth once daily adjunct therapy for depression.  3. Increase busPIRone (BUSPAR) 30 MG tablet - Take 1 tablet (30 mg total) " by mouth 2 (two) times daily for anxiety.  4. Continue hydrOXYzine pamoate (VISTARIL) 50 MG Cap - Take 1 capsule (50 mg total) by mouth daily as needed for insomnia.  5. Continue ALPRAZolam (XANAX) 0.25 MG tablet - Take 1 tablet (0.25 mg total) by mouth 2 (two) times daily as needed for Anxiety. Discussed risk of decreased RT, sedation, addictive potential, and not to mix with alcohol.      Anxiety: Same, no improvement  Depression: Same, no improvement  Sleep: Good, no issues  Appetite: Decreased because of COVID, but no issues     Denies suicidal/homicidal ideations.  Denies hopelessness/worthlessness.    Denies auditory/visual hallucinations      Tobacco: denies  Alcohol: denies (in recovery since 2020)  Drug use: denies  Caffeine: 1 cup a day      Review of Systems   PSYCHIATRIC: Pertinent items are noted in the narrative.        CONSTITUTIONAL: weight stable        M/S: no pain today         ENT: no allergies noted today        ABD: no n/v/d     Past Medical, Family and Social History: The patient's past medical, family and social history have been reviewed and updated as appropriate within the electronic medical record. See encounter notes.     Medication Compliance: yes      Side effects: tolerates     Risk Parameters:  Patient reports no suicidal ideation  Patient reports no homicidal ideation  Patient reports no self-injurious behavior  Patient reports no violent behavior     Exam (detailed: at least 9 elements; comprehensive: all 15 elements)   Constitutional  Vitals:  Most recent vital signs, dated less than 90 days prior to this appointment, were reviewed.     General:  unremarkable, age appropriate, casual attire, good eye contact, good rapport       Musculoskeletal  Muscle Strength/Tone:  no flaccidity, no tremor    Gait & Station:  normal      Psychiatric                       Speech:  normal tone, normal rate, rhythm, and volume   Mood & Affect:   Euthymic, congruent, appropriate         Thought  Process:   Goal directed; Linear    Associations:   intact   Thought Content:   No SI/HI, delusions, or paranoia, no AV/VH   Insight & Judgement:   Good, adequate to circumstances   Orientation:   grossly intact; alert and oriented x 4    Memory:  intact for content of interview    Language:  grossly intact, can repeat    Attention Span  : Grossly intact for content of interview   Fund of Knowledge:   intact and appropriate to age and level of education        Assessment and Diagnosis   Status/Progress: Based on the examination today, the patient's problem(s) is/are under fair control.  New problems have not been presented today. Comorbidities are not currently complicating management of the primary condition.      Impression:  Pt presents to OP Psychiatry for routine follow-up for treatment/medication management of MDD, JOVANNI, Insomnia. Pt reports missing his December appointment d/t being away, getting COVID, the holidays were hectic. States he is still congested and c/o COVID symptoms, but is getting better. Reports anxiety and depression have not improved, discussed increasing Zoloft and Abilify for anxiety and depression, pt in agreement. Will continue all other medications as ordered, denies any sleep issues, and reports appetite decreased with COVID, but he has been eating w/o any issues. Pt denies SI/HI/AVH, self-harm, plan, or intent. Pt verbalizes understanding of medication instructions through teach back. No other issues, concerns, or problems, will reassess symptoms and medications in 30 days or PRN if symptoms worsen.      Diagnosis:   - Moderate episode of recurrent major depressive disorder  - JOVANNI (generalized anxiety disorder)  - Insomnia, psychophysiological      Intervention/Counseling/Treatment Plan   Medication Management:      1. Increase sertraline (ZOLOFT) 100 MG tablet - Take 1.5 tablet (150 mg total) by mouth once daily for depression/anxiety. Discussed potential for GI side effects, sexual  dysfunction, mood destabilization, headaches  Discussed potential for GI side effects, sexual dysfunction, mood destabilization, headaches.      2. Increase ARIPiprazole (ABILIFY) 5 MG Tab - Take 1 tablet (5 mg total) by mouth once daily adjunct therapy for depression.     3. Continue busPIRone (BUSPAR) 30 MG tablet - Take 1 tablet (30 mg total) by mouth 2 (two) times daily for anxiety.     4. Continue hydrOXYzine pamoate (VISTARIL) 50 MG Cap - Take 1 capsule (50 mg total) by mouth daily as needed for insomnia.     5. Continue ALPRAZolam (XANAX) 0.25 MG tablet - Take 1 tablet (0.25 mg total) by mouth 2 (two) times daily as needed for Anxiety. Discussed risk of decreased RT, sedation, addictive potential, and not to mix with alcohol.       6. Call to report any worsening of symptoms or problems with the medication. Pt instructed to go to ER with thoughts of harming self, others     7. Patient given contact # for psychotherapists at [facility] and also instructed she may check with insurance for list of providers.          Labs: none         Return to clinic:      30 days or PRN if symptoms worsen    -Spent 30min face to face with the pt; >50% time spent in counseling   -Supportive therapy and psychoeducation provided  -R/B/SE's of medications discussed with the pt who expresses understanding and chooses to take medications as prescribed.   -Pt instructed to call clinic, 911 or go to nearest emergency room if sxs worsen or pt is in   crisis. The pt expresses understanding.      Cheyenne Child NP  Psychiatric-Mental Health Nurse Practitioner  Department of Psychiatry    [facility] [place] [address] [place] [address]  Phone:  [phone]  Fax: [phone]

## 2024-01-30 ENCOUNTER — PATIENT MESSAGE (OUTPATIENT)
Dept: PSYCHIATRY | Facility: CLINIC | Age: 44
End: 2024-01-30
Payer: COMMERCIAL

## 2024-01-30 NOTE — PROGRESS NOTES
Outpatient Psychiatry Follow-Up Visit    Clinical Status of Patient: Outpatient (Ambulatory)  02/01/2024    The patient location is:  [place], [place]  The patient phone number is: [phone]   Visit type: Virtual visit with synchronous audio and video  Each patient to whom he or she provides medical services by telemedicine is:  (1) informed of the relationship between the physician and patient and the respective role of any other health care provider with respect to management of the patient; and (2) notified that he or she may decline to receive medical services by telemedicine and may withdraw from such care at any time.     Chief Complaint: Pt is a 42 yo M who presents today for a follow-up. Met with patient.       Interval History and Content of Current Session:  Interim Events/Subjective Report/Content of Current Session:  follow up appointment.    Pt is a 42 yo M with past psychiatric hx of Moderate episode of recurrent major depressive disorder, JOVANNI (generalized anxiety disorder), Insomnia, psychophysiological who presents for follow up treatment.     Past Psychiatric hx:   Pt. is a 41 yo M with a past psychiatric hx of Anxiety, Panic attack, Alcohol abuse, Moderate episode of recurrent major depressive disorder presenting to the clinic for an initial evaluation and treatment. Past medical history outlined below. He is currently taking ALPRAZolam (XANAX), paroxetine (PAXIL), prescribed and managed by Dr. Herrera. He reports that these medications have not worked to effectively treat his depression/anxiety. He reports worsening depression and has not felt that the Paxil has worked, states he has been on it for 7 years and would like to try a different medication. Was prescribed Wellbutrin recently in addition to Paxil and stated that it worsened his anxiety significantly, reported that he stopped taking it. States that the Xanax helps, does not report any panic attacks, but continues to experience s/s of  "anxiety. Admits that he has had anxiety a good part of his life and "deals with it", but believes that it is adding to his depression currently. Reports good sleep, "it is thankfully good, and probably the only relief I get". Denies any SI or thoughts of self harm, denies HI/AVH, denies any suicide attempts in the past or psych IP admissions. "The depression lately is what worries me the most, I have tried the online therapy but I would like to see someone for therapy to help".     Pt presents to OP Psychiatry for routine follow-up for treatment/medication management of MDD, JOVANNI, Insomnia. Pt reports missing his December appointment d/t being away, getting COVID, the holidays were hectic. States he is still congested and c/o COVID symptoms, but is getting better. Reports anxiety and depression have not improved, discussed increasing Zoloft and Abilify for anxiety and depression, pt in agreement. Will continue all other medications as ordered, denies any sleep issues, and reports appetite decreased with COVID, but he has been eating w/o any issues. Pt denies SI/HI/AVH, self-harm, plan, or intent. Pt verbalizes understanding of medication instructions through teach back. No other issues, concerns, or problems, will reassess symptoms and medications in 30 days or PRN if symptoms worsen.     Past Medical hx:   Past Medical History:   Diagnosis Date    Alcohol abuse     Anxiety     Hiatal hernia     Panic attack         Interim hx:  Medication changes last visit:     1. Increase sertraline (ZOLOFT) 100 MG tablet - Take 1.5 tablet (150 mg total) by mouth once daily for depression/anxiety. Discussed potential for GI side effects, sexual dysfunction, mood destabilization, headaches  Discussed potential for GI side effects, sexual dysfunction, mood destabilization, headaches.   2. Increase ARIPiprazole (ABILIFY) 5 MG Tab - Take 1 tablet (5 mg total) by mouth once daily adjunct therapy for depression.  3. Continue busPIRone " (BUSPAR) 30 MG tablet - Take 1 tablet (30 mg total) by mouth 2 (two) times daily for anxiety.  4. Continue hydrOXYzine pamoate (VISTARIL) 50 MG Cap - Take 1 capsule (50 mg total) by mouth daily as needed for insomnia.  5. Continue ALPRAZolam (XANAX) 0.25 MG tablet - Take 1 tablet (0.25 mg total) by mouth 2 (two) times daily as needed for Anxiety. Discussed risk of decreased RT, sedation, addictive potential, and not to mix with alcohol.      Anxiety: Improved  Depression: Improved  Sleep: Improved  Appetite: Increased, no issues     Denies suicidal/homicidal ideations.  Denies hopelessness/worthlessness.    Denies auditory/visual hallucinations      Tobacco: denies  Alcohol: denies (in recovery since 2020)  Drug use: denies  Caffeine: 1 cup a day      Review of Systems   PSYCHIATRIC: Pertinent items are noted in the narrative.        CONSTITUTIONAL: weight stable        M/S: no pain today         ENT: no allergies noted today        ABD: no n/v/d     Past Medical, Family and Social History: The patient's past medical, family and social history have been reviewed and updated as appropriate within the electronic medical record. See encounter notes.     Medication Compliance: yes      Side effects: tolerates     Risk Parameters:  Patient reports no suicidal ideation  Patient reports no homicidal ideation  Patient reports no self-injurious behavior  Patient reports no violent behavior     Exam (detailed: at least 9 elements; comprehensive: all 15 elements)   Constitutional  Vitals:  Most recent vital signs, dated less than 90 days prior to this appointment, were reviewed.     General:  unremarkable, age appropriate, casual attire, good eye contact, good rapport       Musculoskeletal  Muscle Strength/Tone:  no flaccidity, no tremor    Gait & Station:  normal      Psychiatric                       Speech:  normal tone, normal rate, rhythm, and volume   Mood & Affect:   Euthymic, congruent, appropriate         Thought  "Process:   Goal directed; Linear    Associations:   intact   Thought Content:   No SI/HI, delusions, or paranoia, no AV/VH   Insight & Judgement:   Good, adequate to circumstances   Orientation:   grossly intact; alert and oriented x 4    Memory:  intact for content of interview    Language:  grossly intact, can repeat    Attention Span  : Grossly intact for content of interview   Fund of Knowledge:   intact and appropriate to age and level of education        Assessment and Diagnosis   Status/Progress: Based on the examination today, the patient's problem(s) is/are under fair control.  New problems have not been presented today. Comorbidities are not currently complicating management of the primary condition.      Impression:  Pt presents to OP Psychiatry for routine follow-up for treatment/medication management of MDD, JOVANNI, Insomnia. Pt reports increases in Zoloft and Abilify have resolved his depression. Does state his appetite has increased but it is not problematic. Explained that increases in Abilify may increase appetite, pt verbalized understanding, "I just need to watch it  a little more, but no issues". Reports sleep is good, no issues. Denies need to change any medications at this time. Pt denies SI/HI/AVH, self-harm, plan, or intent. Pt verbalizes understanding of medication instructions through teach back. No other issues, concerns, or problems, will reassess symptoms and medications in 3 months or PRN if symptoms worsen.      Diagnosis:   - Moderate episode of recurrent major depressive disorder  - JOVANNI (generalized anxiety disorder)  - Insomnia, psychophysiological      Intervention/Counseling/Treatment Plan   Medication Management:      1. Continue sertraline (ZOLOFT) 100 MG tablet - Take 1.5 tablet (150 mg total) by mouth once daily for depression/anxiety. Discussed potential for GI side effects, sexual dysfunction, mood destabilization, headaches  Discussed potential for GI side effects, sexual " dysfunction, mood destabilization, headaches.      2. Continue ARIPiprazole (ABILIFY) 5 MG Tab - Take 1 tablet (5 mg total) by mouth once daily adjunct therapy for depression.     3. Continue busPIRone (BUSPAR) 30 MG tablet - Take 1 tablet (30 mg total) by mouth 2 (two) times daily for anxiety.     4. Continue hydrOXYzine pamoate (VISTARIL) 50 MG Cap - Take 1 capsule (50 mg total) by mouth daily as needed for insomnia.     5. Continue ALPRAZolam (XANAX) 0.25 MG tablet - Take 1 tablet (0.25 mg total) by mouth 2 (two) times daily as needed for Anxiety. Discussed risk of decreased RT, sedation, addictive potential, and not to mix with alcohol.       6. Call to report any worsening of symptoms or problems with the medication. Pt instructed to go to ER with thoughts of harming self, others     7. Patient given contact # for psychotherapists at [facility] and also instructed she may check with insurance for list of providers.          Labs: none         Return to clinic:      3 months or PRN if symptoms worsen    -Spent 30min face to face with the pt; >50% time spent in counseling   -Supportive therapy and psychoeducation provided  -R/B/SE's of medications discussed with the pt who expresses understanding and chooses to take medications as prescribed.   -Pt instructed to call clinic, 911 or go to nearest emergency room if sxs worsen or pt is in   crisis. The pt expresses understanding.      Cheyenne Child NP  Psychiatric-Mental Health Nurse Practitioner  Department of Psychiatry    [facility] [place] [address] [place] [address]  Phone:  [phone]  Fax: [phone]

## 2024-02-01 ENCOUNTER — OFFICE VISIT (OUTPATIENT)
Dept: PSYCHIATRY | Facility: CLINIC | Age: 44
End: 2024-02-01
Payer: COMMERCIAL

## 2024-02-01 DIAGNOSIS — F51.04 INSOMNIA, PSYCHOPHYSIOLOGICAL: ICD-10-CM

## 2024-02-01 DIAGNOSIS — F33.1 MODERATE EPISODE OF RECURRENT MAJOR DEPRESSIVE DISORDER: Primary | ICD-10-CM

## 2024-02-01 DIAGNOSIS — F41.1 GAD (GENERALIZED ANXIETY DISORDER): ICD-10-CM

## 2024-02-01 PROCEDURE — 1159F MED LIST DOCD IN RCRD: CPT | Mod: CPTII,95,,

## 2024-02-01 PROCEDURE — 1160F RVW MEDS BY RX/DR IN RCRD: CPT | Mod: CPTII,95,,

## 2024-02-01 PROCEDURE — 99214 OFFICE O/P EST MOD 30 MIN: CPT | Mod: 95,,,

## 2024-02-01 RX ORDER — ALPRAZOLAM 0.25 MG/1
0.25 TABLET ORAL 2 TIMES DAILY PRN
Qty: 60 TABLET | Refills: 0 | Status: SHIPPED | OUTPATIENT
Start: 2024-02-02 | End: 2024-03-01 | Stop reason: SDUPTHER

## 2024-03-01 DIAGNOSIS — F41.1 GAD (GENERALIZED ANXIETY DISORDER): ICD-10-CM

## 2024-03-01 RX ORDER — ALPRAZOLAM 0.25 MG/1
0.25 TABLET ORAL 2 TIMES DAILY PRN
Qty: 60 TABLET | Refills: 0 | Status: SHIPPED | OUTPATIENT
Start: 2024-03-01 | End: 2024-03-28 | Stop reason: SDUPTHER

## 2024-03-28 DIAGNOSIS — F41.1 GAD (GENERALIZED ANXIETY DISORDER): ICD-10-CM

## 2024-03-28 RX ORDER — ALPRAZOLAM 0.25 MG/1
0.25 TABLET ORAL 2 TIMES DAILY PRN
Qty: 60 TABLET | Refills: 0 | Status: SHIPPED | OUTPATIENT
Start: 2024-03-31 | End: 2024-05-01 | Stop reason: DRUGHIGH

## 2024-03-28 NOTE — TELEPHONE ENCOUNTER
Last ordered: 3 weeks ago (3/1/2024) by Cheyenne Child NP     Nov none - not due back till May  Lov 2/1/24

## 2024-04-24 ENCOUNTER — TELEPHONE (OUTPATIENT)
Dept: PSYCHIATRY | Facility: CLINIC | Age: 44
End: 2024-04-24
Payer: COMMERCIAL

## 2024-04-25 ENCOUNTER — TELEPHONE (OUTPATIENT)
Dept: PSYCHIATRY | Facility: CLINIC | Age: 44
End: 2024-04-25
Payer: COMMERCIAL

## 2024-04-25 NOTE — TELEPHONE ENCOUNTER
Pts father called in stating that he relapsed and quit taking his meds.  He just stated taking Xanax again but Tai would like to know if youd like to see pt first before he starts the other meds again or can he just start taking them.     Please advise.

## 2024-04-29 ENCOUNTER — PATIENT MESSAGE (OUTPATIENT)
Dept: PSYCHIATRY | Facility: CLINIC | Age: 44
End: 2024-04-29
Payer: COMMERCIAL

## 2024-04-30 NOTE — PROGRESS NOTES
"Outpatient Psychiatry Follow-Up Visit    Clinical Status of Patient: Outpatient (Ambulatory)  05/01/2024     Chief Complaint: Pt is a 43 y.o. male who presents today for a follow-up. Met with patient.       Interval History and Content of Current Session:  Interim Events/Subjective Report/Content of Current Session:  follow up appointment.    Pt is a 43 y.o. male with past psychiatric hx of Moderate episode of recurrent major depressive disorder, JOVANNI (generalized anxiety disorder), Insomnia, psychophysiological who presents for follow up treatment post ETOH relapse (5th of liquor every day, started 6 months ago). Last drink 4/22.     Past Psychiatric hx:   Pt. is a 43 yo M with a past psychiatric hx of Anxiety, Panic attack, Alcohol abuse, Moderate episode of recurrent major depressive disorder presenting to the clinic for an initial evaluation and treatment. Past medical history outlined below. He is currently taking ALPRAZolam (XANAX), paroxetine (PAXIL), prescribed and managed by Dr. Herrera. He reports that these medications have not worked to effectively treat his depression/anxiety. He reports worsening depression and has not felt that the Paxil has worked, states he has been on it for 7 years and would like to try a different medication. Was prescribed Wellbutrin recently in addition to Paxil and stated that it worsened his anxiety significantly, reported that he stopped taking it. States that the Xanax helps, does not report any panic attacks, but continues to experience s/s of anxiety. Admits that he has had anxiety a good part of his life and "deals with it", but believes that it is adding to his depression currently. Reports good sleep, "it is thankfully good, and probably the only relief I get". Denies any SI or thoughts of self harm, denies HI/AVH, denies any suicide attempts in the past or psych IP admissions. "The depression lately is what worries me the most, I have tried the online therapy but I " "would like to see someone for therapy to help".     2/1/24: Pt presents to OP Psychiatry for routine follow-up for treatment/medication management of MDD, JOVANNI, Insomnia. Pt reports increases in Zoloft and Abilify have resolved his depression. Does state his appetite has increased but it is not problematic. Explained that increases in Abilify may increase appetite, pt verbalized understanding, "I just need to watch it  a little more, but no issues". Reports sleep is good, no issues. Denies need to change any medications at this time. Pt denies SI/HI/AVH, self-harm, plan, or intent. Pt verbalizes understanding of medication instructions through teach back. No other issues, concerns, or problems, will reassess symptoms and medications in 3 months or PRN if symptoms worsen.     Past Medical hx:   Past Medical History:   Diagnosis Date    Alcohol abuse     Anxiety     Hiatal hernia     Panic attack         Interim hx:  Medication changes last visit:     1. Continue sertraline (ZOLOFT) 100 MG tablet - Take 1.5 tablet (150 mg total) by mouth once daily for depression/anxiety. Discussed potential for GI side effects, sexual dysfunction, mood destabilization, headaches  Discussed potential for GI side effects, sexual dysfunction, mood destabilization, headaches.   2. Continue ARIPiprazole (ABILIFY) 5 MG Tab - Take 1 tablet (5 mg total) by mouth once daily adjunct therapy for depression.  3. Continue busPIRone (BUSPAR) 30 MG tablet - Take 1 tablet (30 mg total) by mouth 2 (two) times daily for anxiety.  4. Continue hydrOXYzine pamoate (VISTARIL) 50 MG Cap - Take 1 capsule (50 mg total) by mouth daily as needed for insomnia.  5. Continue ALPRAZolam (XANAX) 0.25 MG tablet - Take 1 tablet (0.25 mg total) by mouth 2 (two) times daily as needed for Anxiety. Discussed risk of decreased RT, sedation, addictive potential, and not to mix with alcohol.         Anxiety: Increased but improving  Depression: Increased but " improving  Sleep: Decreased but improving  Appetite: Improved since detox from alcohol     Denies suicidal/homicidal ideations.  Denies hopelessness/worthlessness.    Denies auditory/visual hallucinations.      Tobacco: denies  Alcohol: denies (in recovery since 2020, relapse - last drink 4/22/24)  Drug use: denies  Caffeine: 1 cup a day      Review of Systems   PSYCHIATRIC: Pertinent items are noted in the narrative.        CONSTITUTIONAL: weight stable        M/S: no pain today         ENT: no allergies noted today        ABD: no n/v/d     Past Medical, Family and Social History: The patient's past medical, family and social history have been reviewed and updated as appropriate within the electronic medical record. See encounter notes.     Medication Compliance: yes      Side effects: tolerates     Risk Parameters:  Patient reports no suicidal ideation  Patient reports no homicidal ideation  Patient reports no self-injurious behavior  Patient reports no violent behavior     Exam (detailed: at least 9 elements; comprehensive: all 15 elements)   Constitutional  Vitals:  Most recent vital signs, dated less than 90 days prior to this appointment, were reviewed. Pulse:  [124]   BP: (130)/(90)     General:  unremarkable, age appropriate, casual attire, good eye contact, good rapport       Musculoskeletal  Muscle Strength/Tone:  no flaccidity, no tremor    Gait & Station:  normal      Psychiatric                       Speech:  normal tone, normal rate, rhythm, and volume   Mood & Affect:   Euthymic, congruent, appropriate         Thought Process:   Goal directed; Linear    Associations:   intact   Thought Content:   No SI/HI, delusions, or paranoia, no AV/VH   Insight & Judgement:   Good, adequate to circumstances   Orientation:   grossly intact; alert and oriented x 4    Memory:  intact for content of interview    Language:  grossly intact, can repeat    Attention Span  : Grossly intact for content of interview   Fund  "of Knowledge:   intact and appropriate to age and level of education        Assessment and Diagnosis   Status/Progress: Based on the examination today, the patient's problem(s) is/are under fair control.  New problems have not been presented today. Comorbidities are not currently complicating management of the primary condition.      Impression:  Pt presents to OP Psychiatry for routine follow-up for treatment/medication management of MDD, JOVANNI, Insomnia, Alcohol abuse. Pt presents after relapse and detox from alcohol. Reports last drink 4/22/24. Went to the hospital for medical clearance of alcohol wd last week, he was treated and sent home. Pt offered IP detox, pt deferred and has been taking Zofran for N&V and is feeling better. He reports being off of his medications for about three weeks, except for Xanax that he has been taking once daily. He would like to restart medication as he is able to hold food down again. Pt is living in [place] with his parents and they are very supportive of his recovery, he has gone back to his AA meetings and has been in touch with his AA sponsor daily, "I just want to start feeling better, there was no reason that I can think of why I started drinking again but I am back to where I want to be." We discussed restarting Zoloft and Buspar at lower doses for a week and gradually increase until our follow up. Discussed decreasing Xanax to once daily and that it is a high risk medication for people with Alcohol/Substance Abuse. Pt verbalized understanding, and we may look into tapering off in the future. Pt verbalizes the risks of sedation, coma, and/or death r/t benzodiazepine use with alcohol. Discussed adding Inderal PRN to help relieve acute as needed anxiety, pt in agreement to try. Pt to restart Abilify and Vistaril as well. Pt verbalizes understanding of all medication instructions. Pt denies SI/HI/AVH, self-harm, plan, or intent. Pt verbalizes understanding of medication " instructions through teach back. No other issues, concerns, or problems, will reassess symptoms and medications in 30 days or PRN if symptoms worsen.      Diagnosis:   - Moderate episode of recurrent major depressive disorder  - JOVANNI (generalized anxiety disorder)  - Insomnia, psychophysiological  - Alcohol abuse      Intervention/Counseling/Treatment Plan   Medication Management:      1. Restart sertraline (ZOLOFT) 100 MG tablet - Take 0.5 tablet (50 mg total) by mouth once daily for 8 days, THEN Take 1 tablet (100 mg total) by mouth once daily for depression/anxiety. Discussed potential for GI side effects, sexual dysfunction, mood destabilization, headaches  Discussed potential for GI side effects, sexual dysfunction, mood destabilization, headaches.      2. Restart ARIPiprazole (ABILIFY) 5 MG Tab - Take 1 tablet (5 mg total) by mouth once daily adjunct therapy for depression. Typical LEX's reviewed including weight gain, abnormal movements, EPS, TD, metabolic side effects.      3. Restart busPIRone (BUSPAR) 30 MG tablet - Take 0.5 tablet (15 mg total) by mouth 2 (two) times daily for 8 days, THEN Take 1 tablet (30 mg total) by mouth 2 (two) times daily for anxiety.     4. Continue hydrOXYzine pamoate (VISTARIL) 50 MG Cap - Take 1 capsule (50 mg total) by mouth daily as needed for insomnia.     5. Decrease ALPRAZolam (XANAX) 0.25 MG tablet - Take 1 tablet (0.25 mg total) by mouth once daily as needed for Anxiety. Discussed risk of decreased RT, sedation, addictive potential, and not to mix with alcohol.       6. Call to report any worsening of symptoms or problems with the medication. Pt instructed to go to ER with thoughts of harming self, others     7. Patient given contact # for psychotherapists at [facility] and also instructed she may check with insurance for list of providers.         Labs: none        Psychotherapy:   Target symptoms: inattention/distractibility, anxiety    Why chosen therapy is  appropriate versus another modality: relevant to diagnosis, patient responds to this modality  Outcome monitoring methods: self-report, observation, feedback from family   Therapeutic intervention type: supportive psychotherapy  Topics discussed/themes: building skills sets for symptom management, symptom recognition, nutrition, exercise  The patient's response to the intervention is accepting. The patient's progress toward treatment goals is positive progress.  Duration of intervention: 20 minutes         Return to clinic:     30 days or PRN if symptoms worsen     -Spent 60min face to face with the pt; >50% time spent in counseling   -Supportive therapy and psychoeducation provided  -R/B/SE's of medications discussed with the pt who expresses understanding and chooses to take medications as prescribed.   -Pt instructed to call clinic, 911 or go to nearest emergency room if sxs worsen or pt is in   crisis. The pt expresses understanding.      Cheyenne Child NP  Psychiatric-Mental Health Nurse Practitioner  Department of Psychiatry    [facility] [place] [address] [place] [address]  Phone:  [phone]  Fax: [phone]

## 2024-05-01 ENCOUNTER — OFFICE VISIT (OUTPATIENT)
Dept: PSYCHIATRY | Facility: CLINIC | Age: 44
End: 2024-05-01
Payer: COMMERCIAL

## 2024-05-01 VITALS
HEIGHT: 74 IN | SYSTOLIC BLOOD PRESSURE: 130 MMHG | HEART RATE: 124 BPM | WEIGHT: 253.06 LBS | BODY MASS INDEX: 32.48 KG/M2 | DIASTOLIC BLOOD PRESSURE: 90 MMHG

## 2024-05-01 DIAGNOSIS — F33.1 MODERATE EPISODE OF RECURRENT MAJOR DEPRESSIVE DISORDER: ICD-10-CM

## 2024-05-01 DIAGNOSIS — F41.1 GAD (GENERALIZED ANXIETY DISORDER): ICD-10-CM

## 2024-05-01 DIAGNOSIS — F10.10 ALCOHOL ABUSE: Primary | ICD-10-CM

## 2024-05-01 DIAGNOSIS — F51.04 INSOMNIA, PSYCHOPHYSIOLOGICAL: ICD-10-CM

## 2024-05-01 PROCEDURE — 3080F DIAST BP >= 90 MM HG: CPT | Mod: CPTII,S$GLB,,

## 2024-05-01 PROCEDURE — 1160F RVW MEDS BY RX/DR IN RCRD: CPT | Mod: CPTII,S$GLB,,

## 2024-05-01 PROCEDURE — 1159F MED LIST DOCD IN RCRD: CPT | Mod: CPTII,S$GLB,,

## 2024-05-01 PROCEDURE — 99999 PR PBB SHADOW E&M-EST. PATIENT-LVL III: CPT | Mod: PBBFAC,,,

## 2024-05-01 PROCEDURE — 90833 PSYTX W PT W E/M 30 MIN: CPT | Mod: S$GLB,,,

## 2024-05-01 PROCEDURE — 3008F BODY MASS INDEX DOCD: CPT | Mod: CPTII,S$GLB,,

## 2024-05-01 PROCEDURE — 3075F SYST BP GE 130 - 139MM HG: CPT | Mod: CPTII,S$GLB,,

## 2024-05-01 PROCEDURE — 99214 OFFICE O/P EST MOD 30 MIN: CPT | Mod: S$GLB,,,

## 2024-05-01 RX ORDER — ALPRAZOLAM 0.25 MG/1
0.25 TABLET ORAL DAILY PRN
Qty: 30 TABLET | Refills: 0 | Status: SHIPPED | OUTPATIENT
Start: 2024-05-01 | End: 2024-06-04 | Stop reason: SDUPTHER

## 2024-05-01 RX ORDER — PROPRANOLOL HYDROCHLORIDE 10 MG/1
10 TABLET ORAL 3 TIMES DAILY PRN
Qty: 90 TABLET | Refills: 1 | Status: SHIPPED | OUTPATIENT
Start: 2024-05-01 | End: 2024-06-30

## 2024-05-01 NOTE — PATIENT INSTRUCTIONS
1. Restart sertraline (ZOLOFT) 100 MG tablet - Take 0.5 tablet (50 mg total) by mouth once daily for 8 days, THEN Take 1 tablet (100 mg total) by mouth once daily for depression/anxiety.       2. Restart ARIPiprazole (ABILIFY) 5 MG Tab - Take 1 tablet (5 mg total) by mouth once daily adjunct therapy for depression.     3. Restart busPIRone (BUSPAR) 30 MG tablet - Take 0.5 tablet (15 mg total) by mouth 2 (two) times daily for 8 days, THEN Take 1 tablet (30 mg total) by mouth 2 (two) times daily for anxiety.     4. Continue hydrOXYzine pamoate (VISTARIL) 50 MG Cap - Take 1 capsule (50 mg total) by mouth daily as needed for insomnia.     5. Decrease ALPRAZolam (XANAX) 0.25 MG tablet - Take 1 tablet (0.25 mg total) by mouth once daily as needed for Anxiety. Discussed risk of decreased RT, sedation, addictive potential, and not to mix with alcohol.

## 2024-05-14 PROBLEM — R73.03 PREDIABETES: Status: ACTIVE | Noted: 2024-05-14

## 2024-05-31 ENCOUNTER — PATIENT MESSAGE (OUTPATIENT)
Dept: PSYCHIATRY | Facility: CLINIC | Age: 44
End: 2024-05-31
Payer: COMMERCIAL

## 2024-06-03 NOTE — PROGRESS NOTES
Outpatient Psychiatry Follow-Up Visit    Clinical Status of Patient: Outpatient (Ambulatory)  06/04/2024    The patient location is:  [place], [place]  The patient phone number is: [phone]   Visit type: Virtual visit with synchronous audio and video  Each patient to whom he or she provides medical services by telemedicine is:  (1) informed of the relationship between the physician and patient and the respective role of any other health care provider with respect to management of the patient; and (2) notified that he or she may decline to receive medical services by telemedicine and may withdraw from such care at any time.     Chief Complaint: Pt is a 43 y.o. male who presents today for a follow-up. Met with patient.       Interval History and Content of Current Session:  Interim Events/Subjective Report/Content of Current Session:  follow up appointment.    Pt is a 43 y.o. male with past psychiatric hx of Moderate episode of recurrent major depressive disorder, JOVANNI (generalized anxiety disorder), Insomnia, psychophysiological, Alcohol abuse who presents for follow up treatment.     Past Psychiatric hx:   Pt. is a 43 yo M with a past psychiatric hx of Anxiety, Panic attack, Alcohol abuse, Moderate episode of recurrent major depressive disorder presenting to the clinic for an initial evaluation and treatment. Past medical history outlined below. He is currently taking ALPRAZolam (XANAX), paroxetine (PAXIL), prescribed and managed by Dr. Herrera. He reports that these medications have not worked to effectively treat his depression/anxiety. He reports worsening depression and has not felt that the Paxil has worked, states he has been on it for 7 years and would like to try a different medication. Was prescribed Wellbutrin recently in addition to Paxil and stated that it worsened his anxiety significantly, reported that he stopped taking it. States that the Xanax helps, does not report any panic attacks, but continues  "to experience s/s of anxiety. Admits that he has had anxiety a good part of his life and "deals with it", but believes that it is adding to his depression currently. Reports good sleep, "it is thankfully good, and probably the only relief I get". Denies any SI or thoughts of self harm, denies HI/AVH, denies any suicide attempts in the past or psych IP admissions. "The depression lately is what worries me the most, I have tried the online therapy but I would like to see someone for therapy to help".     5/1/24: Pt presents to OP Psychiatry for routine follow-up for treatment/medication management of MDD, JOVANNI, Insomnia, Alcohol abuse. Pt presents after relapse and detox from alcohol. Reports last drink 4/22/24. Went to the hospital for medical clearance of alcohol wd last week, he was treated and sent home. Pt offered IP detox, pt deferred and has been taking Zofran for N&V and is feeling better. He reports being off of his medications for about three weeks, except for Xanax that he has been taking once daily. He would like to restart medication as he is able to hold food down again. Pt is living in [place] with his parents and they are very supportive of his recovery, he has gone back to his AA meetings and has been in touch with his AA sponsor daily, "I just want to start feeling better, there was no reason that I can think of why I started drinking again but I am back to where I want to be." We discussed restarting Zoloft and Buspar at lower doses for a week and gradually increase until our follow up. Discussed decreasing Xanax to once daily and that it is a high risk medication for people with Alcohol/Substance Abuse. Pt verbalized understanding, and we may look into tapering off in the future. Pt verbalizes the risks of sedation, coma, and/or death r/t benzodiazepine use with alcohol. Discussed adding Inderal PRN to help relieve acute as needed anxiety, pt in agreement to try. Pt to restart Abilify and Vistaril as " well. Pt verbalizes understanding of all medication instructions. Pt denies SI/HI/AVH, self-harm, plan, or intent. Pt verbalizes understanding of medication instructions through teach back. No other issues, concerns, or problems, will reassess symptoms and medications in 30 days or PRN if symptoms worsen.     Past Medical hx:   Past Medical History:   Diagnosis Date    Alcohol abuse     Anxiety     Hiatal hernia     Panic attack         Interim hx:  Medication changes last visit:     1. Restart sertraline (ZOLOFT) 100 MG tablet - Take 0.5 tablet (50 mg total) by mouth once daily for 8 days, THEN Take 1 tablet (100 mg total) by mouth once daily for depression/anxiety. Discussed potential for GI side effects, sexual dysfunction, mood destabilization, headaches  Discussed potential for GI side effects, sexual dysfunction, mood destabilization, headaches.   2. Restart ARIPiprazole (ABILIFY) 5 MG Tab - Take 1 tablet (5 mg total) by mouth once daily adjunct therapy for depression. Typical LEX's reviewed including weight gain, abnormal movements, EPS, TD, metabolic side effects.   3. Restart busPIRone (BUSPAR) 30 MG tablet - Take 0.5 tablet (15 mg total) by mouth 2 (two) times daily for 8 days, THEN Take 1 tablet (30 mg total) by mouth 2 (two) times daily for anxiety.  4. Continue hydrOXYzine pamoate (VISTARIL) 50 MG Cap - Take 1 capsule (50 mg total) by mouth daily as needed for insomnia.  5. Decrease ALPRAZolam (XANAX) 0.25 MG tablet - Take 1 tablet (0.25 mg total) by mouth once daily as needed for Anxiety. Discussed risk of decreased RT, sedation, addictive potential, and not to mix with alcohol.           Anxiety: Improved  Depression:  Improved  Sleep: Good, no issues  Appetite: Same, no issues     Denies suicidal/homicidal ideations.  Denies hopelessness/worthlessness.    Denies auditory/visual hallucinations.      Tobacco: denies  Alcohol: denies (in recovery since 2020, relapse - last drink 4/22/24)  Drug use:  denies  Caffeine: 1 cup a day      Review of Systems   PSYCHIATRIC: Pertinent items are noted in the narrative.        CONSTITUTIONAL: weight stable        M/S: no pain today         ENT: no allergies noted today        ABD: no n/v/d     Past Medical, Family and Social History: The patient's past medical, family and social history have been reviewed and updated as appropriate within the electronic medical record. See encounter notes.     Medication Compliance: yes      Side effects: tolerates     Risk Parameters:  Patient reports no suicidal ideation  Patient reports no homicidal ideation  Patient reports no self-injurious behavior  Patient reports no violent behavior     Exam (detailed: at least 9 elements; comprehensive: all 15 elements)   Constitutional  Vitals:  Most recent vital signs, dated less than 90 days prior to this appointment, were reviewed.     General:  unremarkable, age appropriate, casual attire, good eye contact, good rapport       Musculoskeletal  Muscle Strength/Tone:  no flaccidity, no tremor    Gait & Station:  normal      Psychiatric                       Speech:  normal tone, normal rate, rhythm, and volume   Mood & Affect:   Euthymic, congruent, appropriate         Thought Process:   Goal directed; Linear    Associations:   intact   Thought Content:   No SI/HI, delusions, or paranoia, no AV/VH   Insight & Judgement:   Good, adequate to circumstances   Orientation:   grossly intact; alert and oriented x 4    Memory:  intact for content of interview    Language:  grossly intact, can repeat    Attention Span  : Grossly intact for content of interview   Fund of Knowledge:   intact and appropriate to age and level of education        Assessment and Diagnosis   Status/Progress: Based on the examination today, the patient's problem(s) is/are under fair control.  New problems have not been presented today. Comorbidities are not currently complicating management of the primary condition.       Impression:  Pt presents to OP Psychiatry for routine follow-up for treatment/medication management of MDD, JOVANNI, Insomnia, Alcohol abuse. Pt reports restarting all psychotropics went smoothly, w/o SE, feeling better. Pt remains sober and connected with his sponsor and home group. Pt denies medication changes at this time. Pt denies SI/HI/AVH, self-harm, plan, or intent. Pt verbalizes understanding of medication instructions through teach back. No other issues, concerns, or problems, will reassess symptoms and medications in 3 months or PRN if symptoms worsen.      Diagnosis:   - Moderate episode of recurrent major depressive disorder  - JOVANNI (generalized anxiety disorder)  - Insomnia, psychophysiological  - Alcohol abuse      Intervention/Counseling/Treatment Plan   Medication Management:      1. Continue sertraline (ZOLOFT) 100 MG tablet - Take 1 tablet (100 mg total) by mouth once daily for depression/anxiety. Discussed potential for GI side effects, sexual dysfunction, mood destabilization, headaches  Discussed potential for GI side effects, sexual dysfunction, mood destabilization, headaches.      2. Continue ARIPiprazole (ABILIFY) 5 MG Tab - Take 1 tablet (5 mg total) by mouth once daily adjunct therapy for depression. Typical LEX's reviewed including weight gain, abnormal movements, EPS, TD, metabolic side effects.      3. Continue busPIRone (BUSPAR) 30 MG tablet - Take 1 tablet (30 mg total) by mouth 2 (two) times daily for anxiety.     4. Continue hydrOXYzine pamoate (VISTARIL) 50 MG Cap - Take 1 capsule (50 mg total) by mouth daily as needed for insomnia.     5. Continue ALPRAZolam (XANAX) 0.25 MG tablet - Take 1 tablet (0.25 mg total) by mouth once daily as needed for Anxiety. Discussed risk of decreased RT, sedation, addictive potential, and not to mix with alcohol.       6. Call to report any worsening of symptoms or problems with the medication. Pt instructed to go to ER with thoughts of harming self,  others     7. Patient given contact # for psychotherapists at [facility] and also instructed she may check with insurance for list of providers.         Labs: none         Return to clinic:    3 months or PRN if symptoms worsen      -Spent 30min face to face with the pt; >50% time spent in counseling   -Supportive therapy and psychoeducation provided  -R/B/SE's of medications discussed with the pt who expresses understanding and chooses to take medications as prescribed.   -Pt instructed to call clinic, 911 or go to nearest emergency room if sxs worsen or pt is in   crisis. The pt expresses understanding.      Cheyenne Child NP  Psychiatric-Mental Health Nurse Practitioner  Department of Psychiatry    [facility] [place] [address] [place] [address]  Phone:  [phone]  Fax: [phone]

## 2024-06-04 ENCOUNTER — OFFICE VISIT (OUTPATIENT)
Dept: PSYCHIATRY | Facility: CLINIC | Age: 44
End: 2024-06-04
Payer: COMMERCIAL

## 2024-06-04 DIAGNOSIS — F33.1 MODERATE EPISODE OF RECURRENT MAJOR DEPRESSIVE DISORDER: ICD-10-CM

## 2024-06-04 DIAGNOSIS — F41.1 GAD (GENERALIZED ANXIETY DISORDER): ICD-10-CM

## 2024-06-04 DIAGNOSIS — F51.04 INSOMNIA, PSYCHOPHYSIOLOGICAL: ICD-10-CM

## 2024-06-04 DIAGNOSIS — F10.10 ALCOHOL ABUSE: Primary | ICD-10-CM

## 2024-06-04 PROCEDURE — 99214 OFFICE O/P EST MOD 30 MIN: CPT | Mod: 95,,,

## 2024-06-04 PROCEDURE — 1159F MED LIST DOCD IN RCRD: CPT | Mod: CPTII,95,,

## 2024-06-04 PROCEDURE — 1160F RVW MEDS BY RX/DR IN RCRD: CPT | Mod: CPTII,95,,

## 2024-06-04 RX ORDER — ALPRAZOLAM 0.25 MG/1
0.25 TABLET ORAL DAILY PRN
Qty: 30 TABLET | Refills: 0 | Status: SHIPPED | OUTPATIENT
Start: 2024-06-04 | End: 2024-07-04

## 2024-06-04 RX ORDER — ARIPIPRAZOLE 5 MG/1
5 TABLET ORAL NIGHTLY
Qty: 90 TABLET | Refills: 1 | Status: SHIPPED | OUTPATIENT
Start: 2024-06-04 | End: 2024-12-01

## 2024-06-04 RX ORDER — BUSPIRONE HYDROCHLORIDE 30 MG/1
30 TABLET ORAL 2 TIMES DAILY
Qty: 180 TABLET | Refills: 1 | Status: SHIPPED | OUTPATIENT
Start: 2024-06-04 | End: 2024-12-01

## 2024-06-04 RX ORDER — SERTRALINE HYDROCHLORIDE 100 MG/1
100 TABLET, FILM COATED ORAL DAILY
Qty: 90 TABLET | Refills: 0 | Status: SHIPPED | OUTPATIENT
Start: 2024-06-04 | End: 2024-09-02

## 2024-06-04 RX ORDER — HYDROXYZINE PAMOATE 50 MG/1
50 CAPSULE ORAL NIGHTLY PRN
Qty: 90 CAPSULE | Refills: 1 | Status: SHIPPED | OUTPATIENT
Start: 2024-06-04 | End: 2024-12-01

## 2024-06-13 ENCOUNTER — OFFICE VISIT (OUTPATIENT)
Dept: CARDIOLOGY | Facility: CLINIC | Age: 44
End: 2024-06-13
Payer: COMMERCIAL

## 2024-06-13 VITALS
HEART RATE: 90 BPM | DIASTOLIC BLOOD PRESSURE: 85 MMHG | HEIGHT: 74 IN | WEIGHT: 253.06 LBS | BODY MASS INDEX: 32.48 KG/M2 | SYSTOLIC BLOOD PRESSURE: 125 MMHG

## 2024-06-13 DIAGNOSIS — R00.0 TACHYCARDIA: ICD-10-CM

## 2024-06-13 DIAGNOSIS — F10.939 ALCOHOL WITHDRAWAL SYNDROME WITH COMPLICATION: ICD-10-CM

## 2024-06-13 DIAGNOSIS — D62 ACUTE BLOOD LOSS ANEMIA: ICD-10-CM

## 2024-06-13 DIAGNOSIS — F10.10 ALCOHOL ABUSE: Primary | ICD-10-CM

## 2024-06-13 DIAGNOSIS — F51.04 INSOMNIA, PSYCHOPHYSIOLOGICAL: ICD-10-CM

## 2024-06-13 PROCEDURE — 99204 OFFICE O/P NEW MOD 45 MIN: CPT | Mod: S$GLB,,, | Performed by: INTERNAL MEDICINE

## 2024-06-13 PROCEDURE — 1159F MED LIST DOCD IN RCRD: CPT | Mod: CPTII,S$GLB,, | Performed by: INTERNAL MEDICINE

## 2024-06-13 PROCEDURE — 99999 PR PBB SHADOW E&M-EST. PATIENT-LVL IV: CPT | Mod: PBBFAC,,, | Performed by: INTERNAL MEDICINE

## 2024-06-13 PROCEDURE — 3074F SYST BP LT 130 MM HG: CPT | Mod: CPTII,S$GLB,, | Performed by: INTERNAL MEDICINE

## 2024-06-13 PROCEDURE — 3008F BODY MASS INDEX DOCD: CPT | Mod: CPTII,S$GLB,, | Performed by: INTERNAL MEDICINE

## 2024-06-13 PROCEDURE — 1160F RVW MEDS BY RX/DR IN RCRD: CPT | Mod: CPTII,S$GLB,, | Performed by: INTERNAL MEDICINE

## 2024-06-13 PROCEDURE — 3079F DIAST BP 80-89 MM HG: CPT | Mod: CPTII,S$GLB,, | Performed by: INTERNAL MEDICINE

## 2024-06-13 NOTE — PROGRESS NOTES
Subjective:    Patient ID:  Ruthy Ramires is a 43 y.o. male patient here for evaluation Establish Care      History of Present Illness:  New patient cardiac evaluation.  Patient referred for tachycardia.  Symptomatic with intermittent fluttering, has since resolved.  No past documented ischemic structural arrhythmic heart disease.  Baseline EKG with sinus tachycardia otherwise normal.  Heart rate today in 70s.    No major risk factor profile.  Denies tobacco/diabetes mellitus/hypertension/dyslipidemia.  Negative family history     Remote history ETOH syndrome.  Resolved.        Review of patient's allergies indicates:  No Known Allergies    Past Medical History:   Diagnosis Date    Alcohol abuse     Anxiety     Hiatal hernia     Panic attack      Past Surgical History:   Procedure Laterality Date    ESOPHAGOGASTRODUODENOSCOPY N/A 4/21/2020    Procedure: EGD (ESOPHAGOGASTRODUODENOSCOPY);  Surgeon: Aakash Lugo MD;  Location: [facility];  Service: Endoscopy;  Laterality: N/A;    NASAL ENDOSCOPY       Social History     Tobacco Use    Smoking status: Never    Smokeless tobacco: Never   Substance Use Topics    Alcohol use: Not Currently    Drug use: Never        Review of Systems:    As noted in HPI in addition      REVIEW OF SYSTEMS  Review of Systems   Constitutional: Negative for decreased appetite, diaphoresis, night sweats, weight gain and weight loss.   HENT:  Negative for nosebleeds and odynophagia.    Eyes:  Negative for double vision and photophobia.   Cardiovascular:  Negative for chest pain, claudication, cyanosis, dyspnea on exertion, irregular heartbeat, leg swelling, near-syncope, orthopnea, palpitations, paroxysmal nocturnal dyspnea and syncope.   Respiratory:  Negative for cough, hemoptysis, shortness of breath and wheezing.    Hematologic/Lymphatic: Negative for adenopathy.   Skin:  Negative for flushing, skin cancer and suspicious lesions.   Musculoskeletal:  Negative for gout, myalgias  and neck pain.   Gastrointestinal:  Negative for abdominal pain, heartburn, hematemesis and hematochezia.   Genitourinary:  Negative for bladder incontinence, hesitancy and nocturia.   Neurological:  Negative for focal weakness, headaches, light-headedness and paresthesias.   Psychiatric/Behavioral:  Negative for memory loss and substance abuse.               Objective:        Vitals:    06/13/24 1456   BP: 125/85   Pulse: 90       Lab Results   Component Value Date    WBC 7.94 04/23/2024    HGB 15.5 04/23/2024    HCT 46.5 04/23/2024     04/23/2024    CHOL 177 05/14/2024    TRIG 97 05/14/2024    HDL 46 05/14/2024     (H) 04/23/2024     (H) 04/23/2024     05/14/2024    K 4.2 05/14/2024     05/14/2024    CREATININE 0.93 05/14/2024    BUN 12 05/14/2024    CO2 29 05/14/2024    TSH 1.460 05/14/2024    PSA 1.0 05/14/2024    INR 1.4 04/20/2020        ECHOCARDIOGRAM RESULTS  No results found for this or any previous visit.    No results found for this or any previous visit.          CURRENT/PREVIOUS VISIT EKG  Results for orders placed or performed during the hospital encounter of 04/23/24   EKG 12-lead    Collection Time: 04/23/24  9:51 AM   Result Value Ref Range    QRS Duration 84 ms    [facility] QTC Calculation 447 ms    Narrative    Test Reason : I10,    Vent. Rate : 112 BPM     Atrial Rate : 112 BPM     P-R Int : 156 ms          QRS Dur : 084 ms      QT Int : 328 ms       P-R-T Axes : 051 076 012 degrees     QTc Int : 447 ms    Sinus tachycardia  Otherwise normal ECG  When compared with ECG of 17-FEB-2022 07:45,  Nonspecific T wave abnormality, worse in Anterior leads  Confirmed by NATHANIEL BUTLER MD ([id number]) on 4/23/2024 5:14:13 PM    Referred By: [email or web address]   SELF           Confirmed By:NATHANIEL BUTLER MD     No valid procedures specified.   No results found for this or any previous visit.    No valid procedures specified.    PHYSICAL EXAM  GENERAL: well built, well nourished,  well-developed in no apparent distress alert and oriented.   HEENT: Normocephalic. Pupils normal and conjunctivae normal.  Mucous membranes normal, no cyanosis or icterus, trachea central,no pallor or icterus is noted..   NECK: No JVD. No bruit..   THYROID: Thyroid not enlarged. No nodules present..   CARDIAC:  Normal S1-S2.  No murmur rub click or gallop.  PMI nondisplaced.  LUNGS: Clear to auscultation. No wheezing or rhonchi..   ABDOMEN: Soft no masses or organomegaly.  No abdomen pulsations or bruits.  Normal bowel sounds. No pulsations and no masses felt, No guarding or rebound.   URINARY: No brown catheter   EXTREMITIES: No cyanosis, clubbing or edema noted at this time., no calf tenderness bilaterally.   PERIPHERAL VASCULAR SYSTEM: Good palpable distal pulses.  2+ femoral, popliteal and pedal pulses.  No bruits    CENTRAL NERVOUS SYSTEM: No focal motor or sensory deficits noted.   SKIN: Skin without lesions, moist, well perfused.   MUSCLE STRENGTH & TONE: No noteable weakness, atrophy or abnormal movement    I HAVE REVIEWED :    The vital signs, nurses notes, and all the pertinent radiology and labs.         Current Outpatient Medications   Medication Instructions    ALPRAZolam (XANAX) 0.25 mg, Oral, Daily PRN    ARIPiprazole (ABILIFY) 5 mg, Oral, Nightly    busPIRone (BUSPAR) 30 mg, Oral, 2 times daily    hydrOXYzine pamoate (VISTARIL) 50 mg, Oral, Nightly PRN    ondansetron (ZOFRAN-ODT) 4 mg, Oral, Every 6 hours PRN    pantoprazole (PROTONIX) 40 mg, Oral, Daily    propranoloL (INDERAL) 10 mg, Oral, 3 times daily PRN    sertraline (ZOLOFT) 100 mg, Oral, Daily          Assessment:     Intermittent sinus tachycardia, vital signs today stable heart rate 90 blood pressure 126 over 80.    Symptomatic with fluttering, palpitations, resolved.  Cardiac for about a week.      History of EtOH syndrome.  Elevated liver function studies.    Plan:   Patient is on p.r.n. propranolol for anxiety, cardiac exam  stable.  Baseline electrocardiogram normal.    Suggest stress echo.  Purchase PassKit device.  Follow up again in about 6 weeks          No follow-ups on file.

## 2024-06-21 ENCOUNTER — HOSPITAL ENCOUNTER (OUTPATIENT)
Dept: CARDIOLOGY | Facility: HOSPITAL | Age: 44
Discharge: HOME | End: 2024-06-21
Attending: INTERNAL MEDICINE
Payer: COMMERCIAL

## 2024-06-21 VITALS — WEIGHT: 253 LBS | HEIGHT: 74 IN | BODY MASS INDEX: 32.47 KG/M2

## 2024-06-21 DIAGNOSIS — F10.939 ALCOHOL WITHDRAWAL SYNDROME WITH COMPLICATION: ICD-10-CM

## 2024-06-21 DIAGNOSIS — F10.10 ALCOHOL ABUSE: ICD-10-CM

## 2024-06-21 DIAGNOSIS — D62 ACUTE BLOOD LOSS ANEMIA: ICD-10-CM

## 2024-06-21 DIAGNOSIS — R00.0 TACHYCARDIA: ICD-10-CM

## 2024-06-21 DIAGNOSIS — F51.04 INSOMNIA, PSYCHOPHYSIOLOGICAL: ICD-10-CM

## 2024-06-21 LAB
ASCENDING AORTA: 3.04 CM
BSA FOR ECHO PROCEDURE: 2.45 M2
CV ECHO LV RWT: 0.48 CM
CV STRESS BASE HR: 87 BPM
DIASTOLIC BLOOD PRESSURE: 86 MMHG
DOP CALC LVOT AREA: 3.7 CM2
DOP CALC LVOT DIAMETER: 2.18 CM
DOP CALC LVOT PEAK VEL: 0.88 M/S
DOP CALC LVOT STROKE VOLUME: 65.29 CM3
DOP CALCLVOT PEAK VEL VTI: 17.5 CM
E WAVE DECELERATION TIME: 182.78 MSEC
E/A RATIO: 1.42
E/E' RATIO: 8.4 M/S
ECHO LV POSTERIOR WALL: 1.07 CM (ref 0.6–1.1)
FRACTIONAL SHORTENING: 32 % (ref 28–44)
INTERVENTRICULAR SEPTUM: 1.1 CM (ref 0.6–1.1)
IVRT: 72.31 MSEC
LEFT ATRIUM AREA SYSTOLIC (APICAL 2 CHAMBER): 16.68 CM2
LEFT ATRIUM AREA SYSTOLIC (APICAL 4 CHAMBER): 14.95 CM2
LEFT ATRIUM SIZE: 3.4 CM
LEFT ATRIUM VOLUME INDEX MOD: 17 ML/M2
LEFT ATRIUM VOLUME MOD: 40.9 CM3
LEFT INTERNAL DIMENSION IN SYSTOLE: 3.05 CM (ref 2.1–4)
LEFT VENTRICLE DIASTOLIC VOLUME INDEX: 38.05 ML/M2
LEFT VENTRICLE DIASTOLIC VOLUME: 91.32 ML
LEFT VENTRICLE END SYSTOLIC VOLUME APICAL 2 CHAMBER: 46.29 ML
LEFT VENTRICLE END SYSTOLIC VOLUME APICAL 4 CHAMBER: 32.62 ML
LEFT VENTRICLE MASS INDEX: 71 G/M2
LEFT VENTRICLE SYSTOLIC VOLUME INDEX: 15.2 ML/M2
LEFT VENTRICLE SYSTOLIC VOLUME: 36.59 ML
LEFT VENTRICULAR INTERNAL DIMENSION IN DIASTOLE: 4.48 CM (ref 3.5–6)
LEFT VENTRICULAR MASS: 170.46 G
LV LATERAL E/E' RATIO: 7 M/S
LV SEPTAL E/E' RATIO: 10.5 M/S
LVED V (TEICH): 91.32 ML
LVES V (TEICH): 36.59 ML
LVOT MG: 1.76 MMHG
LVOT MV: 0.61 CM/S
MV PEAK A VEL: 0.59 M/S
MV PEAK E VEL: 0.84 M/S
MV STENOSIS PRESSURE HALF TIME: 53.01 MS
MV VALVE AREA P 1/2 METHOD: 4.15 CM2
OHS CV CPX 1 MINUTE RECOVERY HEART RATE: 102 BPM
OHS CV CPX 85 PERCENT MAX PREDICTED HEART RATE MALE: 149
OHS CV CPX ESTIMATED METS: 11
OHS CV CPX MAX PREDICTED HEART RATE: 175
OHS CV CPX PATIENT IS FEMALE: 0
OHS CV CPX PATIENT IS MALE: 1
OHS CV CPX PEAK DIASTOLIC BLOOD PRESSURE: 73 MMHG
OHS CV CPX PEAK HEAR RATE: 148 BPM
OHS CV CPX PEAK RATE PRESSURE PRODUCT: NORMAL
OHS CV CPX PEAK SYSTOLIC BLOOD PRESSURE: 153 MMHG
OHS CV CPX PERCENT MAX PREDICTED HEART RATE ACHIEVED: 85
OHS CV CPX RATE PRESSURE PRODUCT PRESENTING: NORMAL
PULM VEIN S/D RATIO: 0.75
PV PEAK D VEL: 0.55 M/S
PV PEAK S VEL: 0.41 M/S
SINUS: 3.2 CM
STJ: 2.83 CM
STRESS ECHO POST EXERCISE DUR MIN: 6 MINUTES
STRESS ECHO POST EXERCISE DUR SEC: 27 SECONDS
SYSTOLIC BLOOD PRESSURE: 126 MMHG
TDI LATERAL: 0.12 M/S
TDI SEPTAL: 0.08 M/S
TDI: 0.1 M/S
Z-SCORE OF LEFT VENTRICULAR DIMENSION IN END DIASTOLE: -8.92
Z-SCORE OF LEFT VENTRICULAR DIMENSION IN END SYSTOLE: -6.04

## 2024-06-21 PROCEDURE — 93351 STRESS TTE COMPLETE: CPT | Mod: 26,,, | Performed by: INTERNAL MEDICINE

## 2024-06-21 PROCEDURE — 93351 STRESS TTE COMPLETE: CPT | Mod: PO

## 2024-07-11 DIAGNOSIS — F41.1 GAD (GENERALIZED ANXIETY DISORDER): ICD-10-CM

## 2024-07-11 RX ORDER — ALPRAZOLAM 0.25 MG/1
0.25 TABLET ORAL DAILY PRN
Qty: 30 TABLET | Refills: 0 | Status: SHIPPED | OUTPATIENT
Start: 2024-07-11 | End: 2024-08-10

## 2024-08-07 DIAGNOSIS — F41.1 GAD (GENERALIZED ANXIETY DISORDER): ICD-10-CM

## 2024-08-08 RX ORDER — ALPRAZOLAM 0.25 MG/1
0.25 TABLET ORAL DAILY PRN
Qty: 30 TABLET | Refills: 0 | Status: SHIPPED | OUTPATIENT
Start: 2024-08-08 | End: 2024-09-07

## 2024-09-03 NOTE — PROGRESS NOTES
Outpatient Psychiatry Follow-Up Visit    Clinical Status of Patient: Outpatient (Ambulatory)  09/04/2024    The patient location is:  [place], [place]  The patient phone number is: [phone]   Visit type: Virtual visit with synchronous audio and video  Each patient to whom he or she provides medical services by telemedicine is:  (1) informed of the relationship between the physician and patient and the respective role of any other health care provider with respect to management of the patient; and (2) notified that he or she may decline to receive medical services by telemedicine and may withdraw from such care at any time.     Chief Complaint: Pt is a 43 y.o. male who presents today for a follow-up. Met with patient.       Interval History and Content of Current Session:  Interim Events/Subjective Report/Content of Current Session:  follow up appointment.    Pt is a 43 y.o. male with past psychiatric hx of Moderate episode of recurrent major depressive disorder, JOVANNI (generalized anxiety disorder), Insomnia, psychophysiological, Alcohol abuse who presents for follow up treatment.     Past Psychiatric hx:   Pt. is a 41 yo M with a past psychiatric hx of Anxiety, Panic attack, Alcohol abuse, Moderate episode of recurrent major depressive disorder presenting to the clinic for an initial evaluation and treatment. Past medical history outlined below. He is currently taking ALPRAZolam (XANAX), paroxetine (PAXIL), prescribed and managed by Dr. Herrera. He reports that these medications have not worked to effectively treat his depression/anxiety. He reports worsening depression and has not felt that the Paxil has worked, states he has been on it for 7 years and would like to try a different medication. Was prescribed Wellbutrin recently in addition to Paxil and stated that it worsened his anxiety significantly, reported that he stopped taking it. States that the Xanax helps, does not report any panic attacks, but continues  "to experience s/s of anxiety. Admits that he has had anxiety a good part of his life and "deals with it", but believes that it is adding to his depression currently. Reports good sleep, "it is thankfully good, and probably the only relief I get". Denies any SI or thoughts of self harm, denies HI/AVH, denies any suicide attempts in the past or psych IP admissions. "The depression lately is what worries me the most, I have tried the online therapy but I would like to see someone for therapy to help".     6/4/24: Pt presents to OP Psychiatry for routine follow-up for treatment/medication management of MDD, JOVANNI, Insomnia, Alcohol abuse. Pt reports restarting all psychotropics went smoothly, w/o SE, feeling better. Pt remains sober and connected with his sponsor and home group. Pt denies medication changes at this time. Pt denies SI/HI/AVH, self-harm, plan, or intent. Pt verbalizes understanding of medication instructions through teach back. No other issues, concerns, or problems, will reassess symptoms and medications in 3 months or PRN if symptoms worsen.     Past Medical hx:   Past Medical History:   Diagnosis Date    Alcohol abuse     Anxiety     Hiatal hernia     Panic attack         Interim hx:  Medication changes last visit:     1. Continue sertraline (ZOLOFT) 100 MG tablet - Take 1 tablet (100 mg total) by mouth once daily for depression/anxiety. Discussed potential for GI side effects, sexual dysfunction, mood destabilization, headaches  Discussed potential for GI side effects, sexual dysfunction, mood destabilization, headaches.   2. Continue ARIPiprazole (ABILIFY) 5 MG Tab - Take 1 tablet (5 mg total) by mouth once daily adjunct therapy for depression. Typical LEX's reviewed including weight gain, abnormal movements, EPS, TD, metabolic side effects.   3. Continue busPIRone (BUSPAR) 30 MG tablet - Take 1 tablet (30 mg total) by mouth 2 (two) times daily for anxiety.  4. Continue hydrOXYzine pamoate (VISTARIL) " 50 MG Cap - Take 1 capsule (50 mg total) by mouth daily as needed for insomnia.  5. Continue ALPRAZolam (XANAX) 0.25 MG tablet - Take 1 tablet (0.25 mg total) by mouth once daily as needed for Anxiety. Discussed risk of decreased RT, sedation, addictive potential, and not to mix with alcohol.    6. Continue propranoloL (INDERAL) 10 MG tablet - Take 1 tablet (10 mg total) by mouth 3 (three) times daily as needed (anxiety).         Anxiety: Good, no issues  Depression: Good, no issues  Sleep: Good, no issues  Appetite: Decreased, but manageable     Denies suicidal/homicidal ideations.  Denies hopelessness/worthlessness.    Denies auditory/visual hallucinations.      Tobacco: denies  Alcohol: denies (in recovery since 2020, relapse - last drink 4/22/24)  Drug use: denies  Caffeine: 1 cup a day       Review of Systems   PSYCHIATRIC: Pertinent items are noted in the narrative.        CONSTITUTIONAL: weight stable        M/S: no pain today         ENT: no allergies noted today        ABD: no n/v/d     Past Medical, Family and Social History: The patient's past medical, family and social history have been reviewed and updated as appropriate within the electronic medical record. See encounter notes.     Medication Compliance: yes      Side effects: tolerates     Risk Parameters:  Patient reports no suicidal ideation  Patient reports no homicidal ideation  Patient reports no self-injurious behavior  Patient reports no violent behavior     Exam (detailed: at least 9 elements; comprehensive: all 15 elements)   Constitutional  Vitals:  Most recent vital signs, dated less than 90 days prior to this appointment, were reviewed.     General:  unremarkable, age appropriate, casual attire, good eye contact, good rapport       Musculoskeletal  Muscle Strength/Tone:  no flaccidity, no tremor    Gait & Station:  normal      Psychiatric                       Speech:  normal tone, normal rate, rhythm, and volume   Mood & Affect:    Euthymic, congruent, appropriate         Thought Process:   Goal directed; Linear    Associations:   intact   Thought Content:   No SI/HI, delusions, or paranoia, no AV/VH   Insight & Judgement:   Good, adequate to circumstances   Orientation:   grossly intact; alert and oriented x 4    Memory:  intact for content of interview    Language:  grossly intact, can repeat    Attention Span  : Grossly intact for content of interview   Fund of Knowledge:   intact and appropriate to age and level of education        Assessment and Diagnosis   Status/Progress: Based on the examination today, the patient's problem(s) is/are under fair control.  New problems have not been presented today. Comorbidities are not currently complicating management of the primary condition.      Impression:  Pt presents to OP Psychiatry for routine follow-up for treatment/medication management of MDD, JOVANNI, Insomnia, Alcohol abuse. Pt reports all medications effectively treating symptoms, remains committed to AA meetings, utilizes a sponsor, and has a commitment. Denies need for any medication changes at this time. Pt denies SI/HI/AVH, self-harm, plan, or intent. Pt verbalizes understanding of medication instructions through teach back. No other issues, concerns, or problems, will reassess symptoms and medications in 3 months or PRN if symptoms worsen.      Diagnosis:   - Moderate episode of recurrent major depressive disorder  - JOVANNI (generalized anxiety disorder)  - Insomnia, psychophysiological  - Alcohol abuse      Intervention/Counseling/Treatment Plan   Medication Management:      1. Continue sertraline (ZOLOFT) 100 MG tablet - Take 1 tablet (100 mg total) by mouth once daily for depression/anxiety. Discussed potential for GI side effects, sexual dysfunction, mood destabilization, headaches  Discussed potential for GI side effects, sexual dysfunction, mood destabilization, headaches.      2. Continue ARIPiprazole (ABILIFY) 5 MG Tab - Take 1  tablet (5 mg total) by mouth once daily adjunct therapy for depression. Typical LEX's reviewed including weight gain, abnormal movements, EPS, TD, metabolic side effects.      3. Continue busPIRone (BUSPAR) 30 MG tablet - Take 1 tablet (30 mg total) by mouth 2 (two) times daily for anxiety.     4. Continue hydrOXYzine pamoate (VISTARIL) 50 MG Cap - Take 1 capsule (50 mg total) by mouth daily as needed for insomnia.     5. Continue ALPRAZolam (XANAX) 0.25 MG tablet - Take 1 tablet (0.25 mg total) by mouth once daily as needed for Anxiety. Discussed risk of decreased RT, sedation, addictive potential, and not to mix with alcohol.       6. Continue propranoloL (INDERAL) 10 MG tablet - Take 1 tablet (10 mg total) by mouth 3 (three) times daily as needed (anxiety).     7. Call to report any worsening of symptoms or problems with the medication. Pt instructed to go to ER with thoughts of harming self, others     8. Patient given contact # for psychotherapists at [facility] and also instructed she may check with insurance for list of providers.         Labs: none         Return to clinic:     3 months or PRN if symptoms worsen     -Spent 30min face to face with the pt; >50% time spent in counseling   -Supportive therapy and psychoeducation provided  -R/B/SE's of medications discussed with the pt who expresses understanding and chooses to take medications as prescribed.   -Pt instructed to call clinic, 911 or go to nearest emergency room if sxs worsen or pt is in   crisis. The pt expresses understanding.      Cheyenne Child NP  Psychiatric-Mental Health Nurse Practitioner  Department of Psychiatry    [facility] [place] [address] [place] [address]  Phone:  [phone]  Fax: [phone]

## 2024-09-04 ENCOUNTER — OFFICE VISIT (OUTPATIENT)
Dept: PSYCHIATRY | Facility: CLINIC | Age: 44
End: 2024-09-04
Payer: COMMERCIAL

## 2024-09-04 DIAGNOSIS — F51.04 INSOMNIA, PSYCHOPHYSIOLOGICAL: ICD-10-CM

## 2024-09-04 DIAGNOSIS — F33.1 MODERATE EPISODE OF RECURRENT MAJOR DEPRESSIVE DISORDER: ICD-10-CM

## 2024-09-04 DIAGNOSIS — F10.10 ALCOHOL ABUSE: Primary | ICD-10-CM

## 2024-09-04 DIAGNOSIS — F41.1 GAD (GENERALIZED ANXIETY DISORDER): ICD-10-CM

## 2024-09-04 PROCEDURE — 1160F RVW MEDS BY RX/DR IN RCRD: CPT | Mod: CPTII,95,,

## 2024-09-04 PROCEDURE — 3044F HG A1C LEVEL LT 7.0%: CPT | Mod: CPTII,95,,

## 2024-09-04 PROCEDURE — 1159F MED LIST DOCD IN RCRD: CPT | Mod: CPTII,95,,

## 2024-09-04 PROCEDURE — 99214 OFFICE O/P EST MOD 30 MIN: CPT | Mod: 95,,,

## 2024-09-04 RX ORDER — ALPRAZOLAM 0.25 MG/1
0.25 TABLET ORAL DAILY PRN
Qty: 30 TABLET | Refills: 0 | Status: SHIPPED | OUTPATIENT
Start: 2024-09-05 | End: 2024-10-05

## 2024-09-04 RX ORDER — SERTRALINE HYDROCHLORIDE 100 MG/1
100 TABLET, FILM COATED ORAL DAILY
Qty: 90 TABLET | Refills: 0 | Status: SHIPPED | OUTPATIENT
Start: 2024-09-04 | End: 2024-12-03

## 2024-09-04 RX ORDER — PROPRANOLOL HYDROCHLORIDE 10 MG/1
10 TABLET ORAL 3 TIMES DAILY PRN
Qty: 90 TABLET | Refills: 1 | Status: SHIPPED | OUTPATIENT
Start: 2024-09-04 | End: 2024-11-03

## 2024-09-29 ENCOUNTER — HOSPITAL ENCOUNTER (INPATIENT)
Facility: HOSPITAL | Age: 44
LOS: 3 days | Discharge: HOME | DRG: 897 | End: 2024-10-02
Attending: INTERNAL MEDICINE | Admitting: STUDENT IN AN ORGANIZED HEALTH CARE EDUCATION/TRAINING PROGRAM
Payer: COMMERCIAL

## 2024-09-29 DIAGNOSIS — R00.0 TACHYCARDIA: ICD-10-CM

## 2024-09-29 DIAGNOSIS — E87.6 HYPOKALEMIA: ICD-10-CM

## 2024-09-29 DIAGNOSIS — F10.939 ALCOHOL WITHDRAWAL: ICD-10-CM

## 2024-09-29 DIAGNOSIS — F10.939 ALCOHOL WITHDRAWAL SYNDROME WITH COMPLICATION: Primary | ICD-10-CM

## 2024-09-29 DIAGNOSIS — E86.0 DEHYDRATION, SEVERE: ICD-10-CM

## 2024-09-29 PROBLEM — R74.8 ELEVATED LIVER ENZYMES: Status: ACTIVE | Noted: 2024-09-29

## 2024-09-29 LAB
ALBUMIN SERPL-MCNC: 4 G/DL (ref 3.3–5.5)
ALBUMIN SERPL-MCNC: 4.8 G/DL (ref 3.3–5.5)
ALBUMIN SERPL-MCNC: 4.9 G/DL (ref 3.3–5.5)
ALLENS TEST: ABNORMAL
ALLENS TEST: ABNORMAL
ALP SERPL-CCNC: 100 U/L (ref 42–141)
ALP SERPL-CCNC: 77 U/L (ref 42–141)
ALP SERPL-CCNC: 88 U/L (ref 42–141)
AMPHET+METHAMPHET UR QL: NEGATIVE
BARBITURATES UR QL SCN>200 NG/ML: NEGATIVE
BENZODIAZ UR QL SCN>200 NG/ML: NEGATIVE
BILIRUB SERPL-MCNC: 2.4 MG/DL (ref 0.2–1.6)
BILIRUB SERPL-MCNC: 2.7 MG/DL (ref 0.2–1.6)
BILIRUB SERPL-MCNC: 2.7 MG/DL (ref 0.2–1.6)
BILIRUBIN, POC UA: ABNORMAL
BLOOD, POC UA: ABNORMAL
BUN SERPL-MCNC: 16 MG/DL (ref 7–22)
BUN SERPL-MCNC: 19 MG/DL (ref 7–22)
BZE UR QL SCN: NEGATIVE
CALCIUM SERPL-MCNC: 8.1 MG/DL (ref 8–10.3)
CALCIUM SERPL-MCNC: 9.6 MG/DL (ref 8–10.3)
CANNABINOIDS UR QL SCN: ABNORMAL
CHLORIDE SERPL-SCNC: 90 MMOL/L (ref 98–108)
CHLORIDE SERPL-SCNC: 97 MMOL/L (ref 98–108)
CLARITY, UA: ABNORMAL
COLOR, UA: ABNORMAL
CREAT SERPL-MCNC: 1 MG/DL (ref 0.6–1.2)
CREAT SERPL-MCNC: 1.2 MG/DL (ref 0.6–1.2)
CREAT UR-MCNC: 231.2 MG/DL (ref 23–375)
GLUCOSE SERPL-MCNC: 165 MG/DL (ref 73–118)
GLUCOSE SERPL-MCNC: 218 MG/DL (ref 73–118)
GLUCOSE, POC UA: NEGATIVE
HCO3 UR-SCNC: 16.7 MMOL/L (ref 24–28)
HCO3 UR-SCNC: 19.3 MMOL/L (ref 24–28)
HCT, POC: NORMAL
HGB, POC: NORMAL (ref 14–18)
INR PPP: 1.1 (ref 0.8–1.2)
KETONES, POC UA: ABNORMAL
LDH SERPL L TO P-CCNC: 3.29 MMOL/L (ref 0.5–2.2)
LDH SERPL L TO P-CCNC: 6.73 MMOL/L (ref 0.5–2.2)
LEUKOCYTE EST, POC UA: NEGATIVE
MCH, POC: NORMAL
MCHC, POC: NORMAL
MCV, POC: NORMAL
METHADONE UR QL SCN>300 NG/ML: NEGATIVE
MPV, POC: NORMAL
NITRITE, POC UA: NEGATIVE
OPIATES UR QL SCN: NEGATIVE
PCO2 BLDA: 27.3 MMHG (ref 35–45)
PCO2 BLDA: 31.2 MMHG (ref 35–45)
PCP UR QL SCN>25 NG/ML: NEGATIVE
PH SMN: 7.39 [PH] (ref 7.35–7.45)
PH SMN: 7.4 [PH] (ref 7.35–7.45)
PH UR STRIP: 6 [PH] (ref 5–8)
PO2 BLDA: 104 MMHG (ref 40–60)
PO2 BLDA: 64 MMHG (ref 40–60)
POC ALT (SGPT): 100 U/L (ref 10–47)
POC ALT (SGPT): 89 U/L (ref 10–47)
POC ALT (SGPT): 99 U/L (ref 10–47)
POC AMYLASE: 51 U/L (ref 14–97)
POC AST (SGOT): 108 U/L (ref 11–38)
POC AST (SGOT): 119 U/L (ref 11–38)
POC AST (SGOT): 122 U/L (ref 11–38)
POC BE: -4 MMOL/L
POC BE: -6 MMOL/L
POC CARDIAC TROPONIN I: 0.06 NG/ML (ref 0–0.08)
POC CARDIAC TROPONIN I: 0.08 NG/ML (ref 0–0.08)
POC GGT: 641 U/L (ref 5–65)
POC PLATELET COUNT: NORMAL
POC SATURATED O2: 92 % (ref 95–100)
POC SATURATED O2: 98 % (ref 95–100)
POC TCO2: 17 MMOL/L (ref 18–33)
POC TCO2: 18 MMOL/L (ref 24–29)
POC TCO2: 20 MMOL/L (ref 24–29)
POC TCO2: 22 MMOL/L (ref 18–33)
POTASSIUM BLD-SCNC: 3.5 MMOL/L (ref 3.6–5.1)
POTASSIUM BLD-SCNC: 3.9 MMOL/L (ref 3.6–5.1)
PROTEIN, POC UA: ABNORMAL
PROTEIN, POC: 6.6 G/DL (ref 6.4–8.1)
PROTEIN, POC: 8.2 G/DL (ref 6.4–8.1)
PROTEIN, POC: 8.2 G/DL (ref 6.4–8.1)
PROTHROMBIN TIME: 12.4 SEC (ref 9–12.5)
RBC, POC: NORMAL
RDW, POC: NORMAL
SAMPLE: ABNORMAL
SAMPLE: ABNORMAL
SAMPLE: NORMAL
SAMPLE: NORMAL
SITE: ABNORMAL
SITE: ABNORMAL
SODIUM BLD-SCNC: 134 MMOL/L (ref 128–145)
SODIUM BLD-SCNC: 139 MMOL/L (ref 128–145)
SPECIFIC GRAVITY, POC UA: >=1.03 (ref 1–1.03)
T4 FREE SERPL-MCNC: 0.99 NG/DL (ref 0.71–1.51)
TOXICOLOGY INFORMATION: ABNORMAL
TSH SERPL DL<=0.005 MIU/L-ACNC: 0.34 UIU/ML (ref 0.4–4)
UROBILINOGEN, POC UA: 1 E.U./DL
WBC, POC: NORMAL

## 2024-09-29 PROCEDURE — 96376 TX/PRO/DX INJ SAME DRUG ADON: CPT | Mod: ER

## 2024-09-29 PROCEDURE — 87040 BLOOD CULTURE FOR BACTERIA: CPT | Mod: 59 | Performed by: EMERGENCY MEDICINE

## 2024-09-29 PROCEDURE — 99291 CRITICAL CARE FIRST HOUR: CPT | Mod: ER

## 2024-09-29 PROCEDURE — 25000003 PHARM REV CODE 250: Mod: ER | Performed by: EMERGENCY MEDICINE

## 2024-09-29 PROCEDURE — 80053 COMPREHEN METABOLIC PANEL: CPT | Mod: ER

## 2024-09-29 PROCEDURE — 82803 BLOOD GASES ANY COMBINATION: CPT | Mod: ER

## 2024-09-29 PROCEDURE — 84443 ASSAY THYROID STIM HORMONE: CPT | Performed by: EMERGENCY MEDICINE

## 2024-09-29 PROCEDURE — 96368 THER/DIAG CONCURRENT INF: CPT | Mod: ER

## 2024-09-29 PROCEDURE — 93005 ELECTROCARDIOGRAM TRACING: CPT | Mod: ER

## 2024-09-29 PROCEDURE — 96375 TX/PRO/DX INJ NEW DRUG ADDON: CPT | Mod: ER

## 2024-09-29 PROCEDURE — 63600175 PHARM REV CODE 636 W HCPCS: Mod: ER | Performed by: EMERGENCY MEDICINE

## 2024-09-29 PROCEDURE — 93010 ELECTROCARDIOGRAM REPORT: CPT | Mod: 76,,, | Performed by: INTERNAL MEDICINE

## 2024-09-29 PROCEDURE — 82040 ASSAY OF SERUM ALBUMIN: CPT | Mod: ER

## 2024-09-29 PROCEDURE — 80307 DRUG TEST PRSMV CHEM ANLYZR: CPT | Performed by: EMERGENCY MEDICINE

## 2024-09-29 PROCEDURE — 81003 URINALYSIS AUTO W/O SCOPE: CPT | Mod: 59,ER

## 2024-09-29 PROCEDURE — 93010 ELECTROCARDIOGRAM REPORT: CPT | Mod: ,,, | Performed by: INTERNAL MEDICINE

## 2024-09-29 PROCEDURE — 83605 ASSAY OF LACTIC ACID: CPT | Mod: ER

## 2024-09-29 PROCEDURE — 96365 THER/PROPH/DIAG IV INF INIT: CPT | Mod: 59,ER

## 2024-09-29 PROCEDURE — 25000003 PHARM REV CODE 250: Performed by: STUDENT IN AN ORGANIZED HEALTH CARE EDUCATION/TRAINING PROGRAM

## 2024-09-29 PROCEDURE — 63600175 PHARM REV CODE 636 W HCPCS: Mod: ER | Performed by: INTERNAL MEDICINE

## 2024-09-29 PROCEDURE — 99900035 HC TECH TIME PER 15 MIN (STAT): Mod: ER

## 2024-09-29 PROCEDURE — 94761 N-INVAS EAR/PLS OXIMETRY MLT: CPT | Mod: ER,XB

## 2024-09-29 PROCEDURE — 25000003 PHARM REV CODE 250: Mod: ER | Performed by: INTERNAL MEDICINE

## 2024-09-29 PROCEDURE — 84439 ASSAY OF FREE THYROXINE: CPT | Performed by: EMERGENCY MEDICINE

## 2024-09-29 PROCEDURE — 85610 PROTHROMBIN TIME: CPT | Performed by: STUDENT IN AN ORGANIZED HEALTH CARE EDUCATION/TRAINING PROGRAM

## 2024-09-29 PROCEDURE — 96367 TX/PROPH/DG ADDL SEQ IV INF: CPT | Mod: ER

## 2024-09-29 PROCEDURE — 63600175 PHARM REV CODE 636 W HCPCS: Performed by: STUDENT IN AN ORGANIZED HEALTH CARE EDUCATION/TRAINING PROGRAM

## 2024-09-29 PROCEDURE — 25500020 PHARM REV CODE 255: Mod: ER | Performed by: INTERNAL MEDICINE

## 2024-09-29 PROCEDURE — 36415 COLL VENOUS BLD VENIPUNCTURE: CPT | Performed by: STUDENT IN AN ORGANIZED HEALTH CARE EDUCATION/TRAINING PROGRAM

## 2024-09-29 PROCEDURE — 85025 COMPLETE CBC W/AUTO DIFF WBC: CPT | Mod: ER

## 2024-09-29 PROCEDURE — 20000000 HC ICU ROOM

## 2024-09-29 RX ORDER — PROPRANOLOL HYDROCHLORIDE 10 MG/1
10 TABLET ORAL 3 TIMES DAILY PRN
Status: DISCONTINUED | OUTPATIENT
Start: 2024-09-29 | End: 2024-10-02 | Stop reason: HOSPADM

## 2024-09-29 RX ORDER — ONDANSETRON HYDROCHLORIDE 2 MG/ML
4 INJECTION, SOLUTION INTRAVENOUS EVERY 8 HOURS PRN
Status: DISCONTINUED | OUTPATIENT
Start: 2024-09-29 | End: 2024-10-01

## 2024-09-29 RX ORDER — MAGNESIUM SULFATE 1 G/100ML
1 INJECTION INTRAVENOUS
Status: COMPLETED | OUTPATIENT
Start: 2024-09-29 | End: 2024-09-29

## 2024-09-29 RX ORDER — HALOPERIDOL 5 MG/ML
5 INJECTION INTRAMUSCULAR
Status: COMPLETED | OUTPATIENT
Start: 2024-09-29 | End: 2024-09-29

## 2024-09-29 RX ORDER — HYDROXYZINE PAMOATE 25 MG/1
50 CAPSULE ORAL NIGHTLY PRN
Status: DISCONTINUED | OUTPATIENT
Start: 2024-09-29 | End: 2024-10-02 | Stop reason: HOSPADM

## 2024-09-29 RX ORDER — LORAZEPAM 2 MG/ML
2 INJECTION INTRAMUSCULAR
Status: DISCONTINUED | OUTPATIENT
Start: 2024-09-29 | End: 2024-10-02 | Stop reason: HOSPADM

## 2024-09-29 RX ORDER — SODIUM CHLORIDE 0.9 % (FLUSH) 0.9 %
10 SYRINGE (ML) INJECTION
Status: DISCONTINUED | OUTPATIENT
Start: 2024-09-29 | End: 2024-10-02 | Stop reason: HOSPADM

## 2024-09-29 RX ORDER — ARIPIPRAZOLE 5 MG/1
5 TABLET ORAL NIGHTLY
Status: DISCONTINUED | OUTPATIENT
Start: 2024-09-29 | End: 2024-10-02 | Stop reason: HOSPADM

## 2024-09-29 RX ORDER — ACETAMINOPHEN 325 MG/1
650 TABLET ORAL EVERY 4 HOURS PRN
Status: DISCONTINUED | OUTPATIENT
Start: 2024-09-29 | End: 2024-10-01

## 2024-09-29 RX ORDER — SERTRALINE HYDROCHLORIDE 50 MG/1
100 TABLET, FILM COATED ORAL DAILY
Status: DISCONTINUED | OUTPATIENT
Start: 2024-09-29 | End: 2024-10-02 | Stop reason: HOSPADM

## 2024-09-29 RX ORDER — LANOLIN ALCOHOL/MO/W.PET/CERES
100 CREAM (GRAM) TOPICAL DAILY
Status: DISCONTINUED | OUTPATIENT
Start: 2024-09-29 | End: 2024-10-02 | Stop reason: HOSPADM

## 2024-09-29 RX ORDER — ASPIRIN 325 MG
325 TABLET ORAL
Status: COMPLETED | OUTPATIENT
Start: 2024-09-29 | End: 2024-09-29

## 2024-09-29 RX ORDER — PANTOPRAZOLE SODIUM 40 MG/1
40 TABLET, DELAYED RELEASE ORAL DAILY
Status: DISCONTINUED | OUTPATIENT
Start: 2024-09-29 | End: 2024-10-02 | Stop reason: HOSPADM

## 2024-09-29 RX ORDER — DIAZEPAM 5 MG/1
10 TABLET ORAL EVERY 8 HOURS
Status: DISCONTINUED | OUTPATIENT
Start: 2024-09-29 | End: 2024-09-30

## 2024-09-29 RX ORDER — ALPRAZOLAM 0.25 MG/1
0.25 TABLET ORAL DAILY PRN
Status: DISCONTINUED | OUTPATIENT
Start: 2024-09-29 | End: 2024-09-29

## 2024-09-29 RX ORDER — BUSPIRONE HYDROCHLORIDE 10 MG/1
30 TABLET ORAL 2 TIMES DAILY
Status: DISCONTINUED | OUTPATIENT
Start: 2024-09-29 | End: 2024-10-02 | Stop reason: HOSPADM

## 2024-09-29 RX ORDER — ENOXAPARIN SODIUM 100 MG/ML
40 INJECTION SUBCUTANEOUS EVERY 24 HOURS
Status: DISCONTINUED | OUTPATIENT
Start: 2024-09-29 | End: 2024-10-02 | Stop reason: HOSPADM

## 2024-09-29 RX ADMIN — THIAMINE HCL TAB 100 MG 100 MG: 100 TAB at 07:09

## 2024-09-29 RX ADMIN — PANTOPRAZOLE SODIUM 40 MG: 40 TABLET, DELAYED RELEASE ORAL at 07:09

## 2024-09-29 RX ADMIN — ENOXAPARIN SODIUM 40 MG: 40 INJECTION SUBCUTANEOUS at 05:09

## 2024-09-29 RX ADMIN — POTASSIUM BICARBONATE 25 MEQ: 977.5 TABLET, EFFERVESCENT ORAL at 06:09

## 2024-09-29 RX ADMIN — HALOPERIDOL LACTATE 5 MG: 5 INJECTION, SOLUTION INTRAMUSCULAR at 05:09

## 2024-09-29 RX ADMIN — SODIUM CHLORIDE 2000 ML: 9 INJECTION, SOLUTION INTRAVENOUS at 05:09

## 2024-09-29 RX ADMIN — PIPERACILLIN AND TAZOBACTAM 4.5 G: 4; .5 INJECTION, POWDER, LYOPHILIZED, FOR SOLUTION INTRAVENOUS; PARENTERAL at 09:09

## 2024-09-29 RX ADMIN — FOLIC ACID 1 MG: 5 INJECTION, SOLUTION INTRAMUSCULAR; INTRAVENOUS; SUBCUTANEOUS at 09:09

## 2024-09-29 RX ADMIN — THIAMINE HYDROCHLORIDE 100 MG: 100 INJECTION, SOLUTION INTRAMUSCULAR; INTRAVENOUS at 09:09

## 2024-09-29 RX ADMIN — LORAZEPAM 2 MG: 2 INJECTION INTRAMUSCULAR; INTRAVENOUS at 12:09

## 2024-09-29 RX ADMIN — DIAZEPAM 10 MG: 5 TABLET ORAL at 09:09

## 2024-09-29 RX ADMIN — BUSPIRONE HYDROCHLORIDE 30 MG: 10 TABLET ORAL at 09:09

## 2024-09-29 RX ADMIN — MAGNESIUM SULFATE IN DEXTROSE 1 G: 10 INJECTION, SOLUTION INTRAVENOUS at 07:09

## 2024-09-29 RX ADMIN — LORAZEPAM 2 MG: 2 INJECTION INTRAMUSCULAR; INTRAVENOUS at 01:09

## 2024-09-29 RX ADMIN — IOHEXOL 100 ML: 350 INJECTION, SOLUTION INTRAVENOUS at 08:09

## 2024-09-29 RX ADMIN — ASCORBIC ACID, VITAMIN A PALMITATE, CHOLECALCIFEROL, THIAMINE HYDROCHLORIDE, RIBOFLAVIN-5 PHOSPHATE SODIUM, PYRIDOXINE HYDROCHLORIDE, NIACINAMIDE, DEXPANTHENOL, ALPHA-TOCOPHEROL ACETATE, VITAMIN K1, FOLIC ACID, BIOTIN, CYANOCOBALAMIN: 200; 3300; 200; 6; 3.6; 6; 40; 15; 10; 150; 600; 60; 5 INJECTION, SOLUTION INTRAVENOUS at 09:09

## 2024-09-29 RX ADMIN — POTASSIUM BICARBONATE 25 MEQ: 977.5 TABLET, EFFERVESCENT ORAL at 09:09

## 2024-09-29 RX ADMIN — LORAZEPAM 2 MG: 2 INJECTION INTRAMUSCULAR; INTRAVENOUS at 08:09

## 2024-09-29 RX ADMIN — ARIPIPRAZOLE 5 MG: 5 TABLET ORAL at 09:09

## 2024-09-29 RX ADMIN — ASPIRIN 325 MG ORAL TABLET 325 MG: 325 PILL ORAL at 12:09

## 2024-09-29 RX ADMIN — SODIUM CHLORIDE, SODIUM LACTATE, POTASSIUM CHLORIDE, AND CALCIUM CHLORIDE 250 ML: .6; .31; .03; .02 INJECTION, SOLUTION INTRAVENOUS at 12:09

## 2024-09-29 NOTE — ASSESSMENT & PLAN NOTE
Presents with alcohol withdrawals, tremors, tachycardia and nausea/vomiting. Last drink 9/28 in the am. Given IVF, IV vitamins and multiple doses of IV ativan in ED. Start on scheduled PO valium. Takes xanax at home as needed.  - CIWA protocol  - clear liquids - advance as tolerated  - PRN ativan for CIWA >8  - thiamine

## 2024-09-29 NOTE — HPI
Mr. Ramires is a 42 yo M with hx of anxiety disorder, depression, alcohol use disorder and hiatal hernia who presents to the [facility] ED for evaluation of nausea, vomiting and weakness. He reports drinking a fifth of vodka daily, last drink yesterday morning per ED staff. Patient has his eyes closed and not participating in my encounter. He has received 6 mg IV ativan prior to this. He did wake up for nurse some and answered questions. All information is taken from chart and other medical staff.  In the ED, he was tachycardic with heart rate in 150s, /92 and oxygen saturation 97% on room air. He was given IVF, IV ativan wth mild improvement. Given concerns for his alcohol withdrawals and heart rate, he will be admitted to ICU for further management.

## 2024-09-29 NOTE — H&P
[facility] Medicine  History & Physical    Patient Name: Ruthy Ramires  MRN: [id number]  Patient Class: IP- Inpatient  Admission Date: 9/29/2024  Attending Physician: Evaristo Mckinley RN   Primary Care Provider: Fanny Arshad NP         Patient information was obtained from patient, EMS personnel, past medical records, and ER records.     Subjective:     Principal Problem:Alcohol withdrawal    Chief Complaint:   Chief Complaint   Patient presents with    Alcolhol withdrawal     Pt c/o nausea/vomiting and weakness due to alcohol withdrawal; has not had a drink since yesterday morning        HPI: Mr. Ramires is a 42 yo M with hx of anxiety disorder, depression, alcohol use disorder and hiatal hernia who presents to the [facility] ED for evaluation of nausea, vomiting and weakness. He reports drinking a fifth of vodka daily, last drink yesterday morning per ED staff. Patient has his eyes closed and not participating in my encounter. He has received 6 mg IV ativan prior to this. He did wake up for nurse some and answered questions. All information is taken from chart and other medical staff.  In the ED, he was tachycardic with heart rate in 150s, /92 and oxygen saturation 97% on room air. He was given IVF, IV ativan wth mild improvement. Given concerns for his alcohol withdrawals and heart rate, he will be admitted to ICU for further management.    Past Medical History:   Diagnosis Date    Alcohol abuse     Anxiety     Hiatal hernia     Panic attack        Past Surgical History:   Procedure Laterality Date    ESOPHAGOGASTRODUODENOSCOPY N/A 4/21/2020    Procedure: EGD (ESOPHAGOGASTRODUODENOSCOPY);  Surgeon: Aakash Lugo MD;  Location: [facility];  Service: Endoscopy;  Laterality: N/A;    NASAL ENDOSCOPY         Review of patient's allergies indicates:  No Known Allergies    No current facility-administered medications on file prior to encounter.     Current Outpatient  Medications on File Prior to Encounter   Medication Sig    ALPRAZolam (XANAX) 0.25 MG tablet Take 1 tablet (0.25 mg total) by mouth daily as needed for Anxiety.    ARIPiprazole (ABILIFY) 5 MG Tab Take 1 tablet (5 mg total) by mouth every evening.    busPIRone (BUSPAR) 30 MG Tab Take 1 tablet (30 mg total) by mouth 2 (two) times daily.    hydrOXYzine pamoate (VISTARIL) 50 MG Cap Take 1 capsule (50 mg total) by mouth nightly as needed (insomnia).    ondansetron (ZOFRAN-ODT) 4 MG TbDL Take 1 tablet (4 mg total) by mouth every 6 (six) hours as needed (nausea).    pantoprazole (PROTONIX) 40 MG tablet Take 1 tablet (40 mg total) by mouth once daily.    propranoloL (INDERAL) 10 MG tablet Take 1 tablet (10 mg total) by mouth 3 (three) times daily as needed (anxiety).    sertraline (ZOLOFT) 100 MG tablet Take 1 tablet (100 mg total) by mouth once daily.     Family History       Problem Relation (Age of Onset)    Cancer Maternal Grandmother    Nephrolithiasis Father    No Known Problems Mother          Tobacco Use    Smoking status: Never    Smokeless tobacco: Never   Substance and Sexual Activity    Alcohol use: Not Currently    Drug use: Never    Sexual activity: Not Currently     Review of Systems  Objective:     Vital Signs (Most Recent):  Temp: 99.1 °F (37.3 °C) (09/29/24 1515)  Pulse: (!) 129 (09/29/24 1515)  Resp: (!) 23 (09/29/24 1515)  BP: 127/68 (09/29/24 1515)  SpO2: (!) 94 % (09/29/24 1515) Vital Signs (24h Range):  Temp:  [97.6 °F (36.4 °C)-99.1 °F (37.3 °C)] 99.1 °F (37.3 °C)  Pulse:  [108-158] 129  Resp:  [18-24] 23  SpO2:  [94 %-99 %] 94 %  BP: (114-143)/(57-92) 127/68     Weight: 109.2 kg (240 lb 11.9 oz)  Body mass index is 30.91 kg/m².     Physical Exam  Vitals and nursing note reviewed.   Constitutional:       General: He is not in acute distress.     Appearance: He is ill-appearing and diaphoretic.   Cardiovascular:      Rate and Rhythm: Regular rhythm. Tachycardia present.      Pulses: Normal pulses.    Pulmonary:      Effort: Pulmonary effort is normal.   Abdominal:      General: Bowel sounds are normal. There is no distension.   Musculoskeletal:         General: No swelling.   Skin:     General: Skin is warm.   Neurological:      Comments: Somnolent but maintaining vitals/airway                Significant Labs: All pertinent labs within the past 24 hours have been reviewed.    Significant Imaging: I have reviewed all pertinent imaging results/findings within the past 24 hours.  Assessment/Plan:     * Alcohol withdrawal  Presents with alcohol withdrawals, tremors, tachycardia and nausea/vomiting. Last drink 9/28 in the am. Given IVF, IV vitamins and multiple doses of IV ativan in ED. Start on scheduled PO valium. Takes xanax at home as needed.  - CIWA protocol  - clear liquids - advance as tolerated  - PRN ativan for CIWA >8  - thiamine      Elevated liver enzymes  Elevated enzymes, suspect due to alcohol use  - MDF only 4.2      Tachycardia  - due to withdrawals, treat supportively      Moderate episode of recurrent major depressive disorder  Stable, resume home medications      JOVANNI (generalized anxiety disorder)  - reviewed Psychiatry notes and resumed home medications        VTE Risk Mitigation (From admission, onward)           Ordered     enoxaparin injection 40 mg  Daily         09/29/24 1353     IP VTE HIGH RISK PATIENT  Once         09/29/24 1353     Place sequential compression device  Until discontinued         09/29/24 1353                  Critical care time spent on the evaluation and treatment of severe organ dysfunction, review of pertinent labs and imaging studies, discussions with consulting providers and discussions with patient/family: 30 minutes.                  Evaristo Mckinley MD  Department of Hospital Medicine  [facility] [place] - Intensive Care

## 2024-09-29 NOTE — NURSING
[facility]  Nurses Note -- 4 Eyes      9/29/2024       Skin assessed on: Admit      [x] No Pressure Injuries Present    [x]Prevention Measures Documented    [] Yes LDA  for Pressure Injury Previously documented     [] Yes New Pressure Injury Discovered   [] LDA for New Pressure Injury Added      Attending RN:  Jennifer Bernstein, RN     Second RN:  Hazel

## 2024-09-29 NOTE — SUBJECTIVE & OBJECTIVE
Past Medical History:   Diagnosis Date    Alcohol abuse     Anxiety     Hiatal hernia     Panic attack        Past Surgical History:   Procedure Laterality Date    ESOPHAGOGASTRODUODENOSCOPY N/A 4/21/2020    Procedure: EGD (ESOPHAGOGASTRODUODENOSCOPY);  Surgeon: Aakash Lugo MD;  Location: [facility];  Service: Endoscopy;  Laterality: N/A;    NASAL ENDOSCOPY         Review of patient's allergies indicates:  No Known Allergies    No current facility-administered medications on file prior to encounter.     Current Outpatient Medications on File Prior to Encounter   Medication Sig    ALPRAZolam (XANAX) 0.25 MG tablet Take 1 tablet (0.25 mg total) by mouth daily as needed for Anxiety.    ARIPiprazole (ABILIFY) 5 MG Tab Take 1 tablet (5 mg total) by mouth every evening.    busPIRone (BUSPAR) 30 MG Tab Take 1 tablet (30 mg total) by mouth 2 (two) times daily.    hydrOXYzine pamoate (VISTARIL) 50 MG Cap Take 1 capsule (50 mg total) by mouth nightly as needed (insomnia).    ondansetron (ZOFRAN-ODT) 4 MG TbDL Take 1 tablet (4 mg total) by mouth every 6 (six) hours as needed (nausea).    pantoprazole (PROTONIX) 40 MG tablet Take 1 tablet (40 mg total) by mouth once daily.    propranoloL (INDERAL) 10 MG tablet Take 1 tablet (10 mg total) by mouth 3 (three) times daily as needed (anxiety).    sertraline (ZOLOFT) 100 MG tablet Take 1 tablet (100 mg total) by mouth once daily.     Family History       Problem Relation (Age of Onset)    Cancer Maternal Grandmother    Nephrolithiasis Father    No Known Problems Mother          Tobacco Use    Smoking status: Never    Smokeless tobacco: Never   Substance and Sexual Activity    Alcohol use: Not Currently    Drug use: Never    Sexual activity: Not Currently     Review of Systems  Objective:     Vital Signs (Most Recent):  Temp: 99.1 °F (37.3 °C) (09/29/24 1515)  Pulse: (!) 129 (09/29/24 1515)  Resp: (!) 23 (09/29/24 1515)  BP: 127/68 (09/29/24 1515)  SpO2: (!) 94 % (09/29/24  1515) Vital Signs (24h Range):  Temp:  [97.6 °F (36.4 °C)-99.1 °F (37.3 °C)] 99.1 °F (37.3 °C)  Pulse:  [108-158] 129  Resp:  [18-24] 23  SpO2:  [94 %-99 %] 94 %  BP: (114-143)/(57-92) 127/68     Weight: 109.2 kg (240 lb 11.9 oz)  Body mass index is 30.91 kg/m².     Physical Exam  Vitals and nursing note reviewed.   Constitutional:       General: He is not in acute distress.     Appearance: He is ill-appearing and diaphoretic.   Cardiovascular:      Rate and Rhythm: Regular rhythm. Tachycardia present.      Pulses: Normal pulses.   Pulmonary:      Effort: Pulmonary effort is normal.   Abdominal:      General: Bowel sounds are normal. There is no distension.   Musculoskeletal:         General: No swelling.   Skin:     General: Skin is warm.   Neurological:      Comments: Somnolent but maintaining vitals/airway                Significant Labs: All pertinent labs within the past 24 hours have been reviewed.    Significant Imaging: I have reviewed all pertinent imaging results/findings within the past 24 hours.

## 2024-09-29 NOTE — ED PROVIDER NOTES
Encounter Date: 9/29/2024       History     Chief Complaint   Patient presents with    Alcolhol withdrawal     Pt c/o nausea/vomiting and weakness due to alcohol withdrawal; has not had a drink since yesterday morning     43-year-old male with a past medical history significant for alcohol abuse, anxiety disorder and hiatal hernia presents to the emergency department complaining of alcohol withdrawal.  Patient states it has been less than 24 hours since he had an alcoholic beverage, but states he has experienced persistent nausea, tremors and vomiting and has not been able to hold down solids or liquids by mouth.  He denies fever/chills/chest pain/shortness breath.    The history is provided by the patient. No  was used.     Review of patient's allergies indicates:  No Known Allergies  Past Medical History:   Diagnosis Date    Alcohol abuse     Anxiety     Hiatal hernia     Panic attack      Past Surgical History:   Procedure Laterality Date    ESOPHAGOGASTRODUODENOSCOPY N/A 4/21/2020    Procedure: EGD (ESOPHAGOGASTRODUODENOSCOPY);  Surgeon: Aakash Lugo MD;  Location: [facility];  Service: Endoscopy;  Laterality: N/A;    NASAL ENDOSCOPY       Family History   Problem Relation Name Age of Onset    No Known Problems Mother      Nephrolithiasis Father      Cancer Maternal Grandmother          colon     Social History     Tobacco Use    Smoking status: Never    Smokeless tobacco: Never   Substance Use Topics    Alcohol use: Not Currently    Drug use: Never     Review of Systems   Constitutional:  Negative for fever.   HENT:  Negative for congestion.    Respiratory:  Negative for shortness of breath.    Cardiovascular:  Negative for chest pain.   Gastrointestinal:  Positive for nausea and vomiting. Negative for abdominal pain, constipation and diarrhea.   Musculoskeletal:  Negative for myalgias.   Skin:  Negative for rash.   All other systems reviewed and are negative.      Physical Exam      Initial Vitals [09/29/24 0448]   BP Pulse Resp Temp SpO2   (!) 122/92 (!) 158 (!) 24 97.6 °F (36.4 °C) 97 %      MAP       --         Physical Exam    Nursing note and vitals reviewed.  Constitutional: He is not diaphoretic.   Anxious appearing   HENT:   Head: Normocephalic and atraumatic.   Right Ear: External ear normal.   Left Ear: External ear normal. Mouth/Throat: Oropharynx is clear and moist.   Eyes: Conjunctivae and EOM are normal. Pupils are equal, round, and reactive to light.   Neck: Neck supple.   Normal range of motion.  Cardiovascular:  Normal rate and regular rhythm.           Pulmonary/Chest: Breath sounds normal. No respiratory distress.   Abdominal: Abdomen is soft. Bowel sounds are normal.   Musculoskeletal:         General: Normal range of motion.      Cervical back: Normal range of motion and neck supple.     Neurological: He is alert. He has normal strength. No cranial nerve deficit.   Skin: Skin is warm and dry. Capillary refill takes less than 2 seconds.   Psychiatric: His mood appears anxious. His speech is rapid and/or pressured. He is hyperactive. He expresses no homicidal and no suicidal ideation.         ED Course   Critical Care    Date/Time: 9/29/2024 6:52 AM    Performed by: Juliet Barone DO  Authorized by: Juliet Barone DO  Direct patient critical care time: 8 minutes  Additional history critical care time: 8 minutes  Ordering / reviewing critical care time: 8 minutes  Documentation critical care time: 8 minutes  Total critical care time (exclusive of procedural time) : 32 minutes  Critical care was necessary to treat or prevent imminent or life-threatening deterioration of the following conditions: circulatory failure, renal failure, dehydration and metabolic crisis.  Critical care was time spent personally by me on the following activities: evaluation of patient's response to treatment, examination of patient, obtaining history from patient or surrogate, ordering and performing  treatments and interventions, ordering and review of laboratory studies, ordering and review of radiographic studies, pulse oximetry, re-evaluation of patient's condition and review of old charts.        Labs Reviewed   POCT URINALYSIS W/O SCOPE - Abnormal       Result Value    Glucose, UA Negative      Bilirubin, UA 1+ (*)     Ketones, UA 3+ (*)     Spec Grav UA >=1.030 (*)     Blood, UA 2+ (*)     PH, UA 6.0      Protein, UA 2+ (*)     Urobilinogen, UA 1.0      Nitrite, UA Negative      Leukocytes, UA Negative      Color, UA POC Red      Clarity, UA, POC Slightly Cloudy     POCT CMP - Abnormal    Albumin, POC 4.8      Alkaline Phosphatase,       ALT (SGPT), POC 99 (*)     AST (SGOT),  (*)     POC BUN 19      Calcium, POC 9.6      POC Chloride 90 (*)     POC Creatinine 1.2      POC Glucose 218 (*)     POC Potassium 3.5 (*)     POC Sodium 139      Bilirubin, POC 2.7 (*)     POC TCO2 17 (*)     Protein, POC 8.2 (*)    ISTAT PROCEDURE - Abnormal    POC PH 7.394      POC PCO2 27.3 (*)     POC PO2 104 (*)     POC HCO3 16.7 (*)     POC BE -6 (*)     POC SATURATED O2 98      POC Lactate 6.73 (*)     POC TCO2 18 (*)     Sample VENOUS      Site Other      Allens Test N/A     POCT LIVER PANEL - Abnormal    Albumin, POC 4.9      Alkaline Phosphatase, POC 88      ALT (SGPT),  (*)     Amylase, POC 51      AST (SGOT),  (*)     POC  (*)     Bilirubin, POC 2.7 (*)     Protein, POC 8.2 (*)    ISTAT PROCEDURE - Abnormal    POC PH 7.400      POC PCO2 31.2 (*)     POC PO2 64 (*)     POC HCO3 19.3 (*)     POC BE -4 (*)     POC SATURATED O2 92      POC Lactate 3.29 (*)     POC TCO2 20 (*)     Sample VENOUS      Site Other      Allens Test N/A     POCT CMP - Abnormal    Albumin, POC 4.0      Alkaline Phosphatase, POC 77      ALT (SGPT), POC 89 (*)     AST (SGOT),  (*)     POC BUN 16      Calcium, POC 8.1      POC Chloride 97 (*)     POC Creatinine 1.0      POC Glucose 165 (*)     POC Potassium  3.9      POC Sodium 134      Bilirubin, POC 2.4 (*)     POC TCO2 22      Protein, POC 6.6     CULTURE, BLOOD   CULTURE, BLOOD   TROPONIN ISTAT    POC Cardiac Troponin I 0.06      Sample unknown     TROPONIN ISTAT    POC Cardiac Troponin I 0.08      Sample unknown     DRUG SCREEN PANEL, URINE EMERGENCY   TSH   POCT CBC    Hematocrit        Hemoglobin        RBC        WBC        MCV        MCH, POC        MCHC        RDW-CV        Platelet Count, POC        MPV       POCT URINALYSIS W/O SCOPE   POCT CMP   POCT TROPONIN   POCT LIVER PANEL   POCT CMP   POCT TROPONIN     EKG Readings: (Independently Interpreted)   Sinus tachycardia, rate of 152 beats per minute, nonspecific T-wave changes, normal ST, no STEMI       Imaging Results              CT Abdomen Pelvis With IV Contrast NO Oral Contrast (Final result)  Result time 09/29/24 10:48:16      Final result by Shyann Harris MD (09/29/24 10:48:16)                   Impression:      Findings compatible with hepatic steatosis.  Otherwise normal exam.      Electronically signed by: Shyann Harris MD  Date:    09/29/2024  Time:    10:48               Narrative:    EXAMINATION:  CT ABDOMEN PELVIS WITH IV CONTRAST    CLINICAL HISTORY:  Abdominal abscess/infection suspected;Nausea/vomiting;    TECHNIQUE:  Low dose axial images, sagittal and coronal reformations were obtained from the lung bases to the pubic symphysis following the IV administration of 100 mL of Omnipaque 350 .  Oral contrast was not given.    COMPARISON:  None.    FINDINGS:  Lung bases: Evaluation of the visualized portions of the lungs, heart and pericardium show no abnormalities.    Gastrointestinal tract: There is high density material admixed with stool in the rectosigmoid colon.  Otherwise the large and small intestines are normal.  The appendix is normal.  There is a small hiatal hernia.  Otherwise the stomach is normal.    Genitourinary system: The kidneys, ureters and bladder are  "normal.    Although the liver measures 18 cm it does not extend beyond the costal margin.  It is diffusely hypodense when compared to the spleen.  There are no focal hepatic lesions.  The hepatic and portal veins are patent.  The spleen, pancreas, adrenal glands and prostate are normal.    The abdominal aorta tapers normally.  There is a retroaortic left renal vein.    Peritoneum/retroperitoneum: No free fluid, free air or lymphadenopathy.    Osseous and soft tissue structures: No suspicious osteolytic or osteoblastic lesions.  The soft tissues are normal.                                       X-Ray Chest AP Portable (Final result)  Result time 09/29/24 08:57:11      Final result by Johnathan Trejo MD (09/29/24 08:57:11)                   Impression:      No acute cardiopulmonary finding identified on this rotation limited single view study.      Electronically signed by: Johnathan Trejo MD  Date:    09/29/2024  Time:    08:57               Narrative:    EXAMINATION:  XR CHEST AP PORTABLE    CLINICAL HISTORY:  Provided history is "  Tachycardia, unspecified".    TECHNIQUE:  One view of the chest.    COMPARISON:  02/17/2022.    FINDINGS:  Patient is rotated.  Cardiomediastinal silhouette is not significantly enlarged.  No confluent area of consolidation.  No large pleural effusion.  No pneumothorax.                                       Medications   lactated ringers bolus 250 mL (250 mLs Intravenous New Bag 9/29/24 1230)   haloperidol lactate injection 5 mg (5 mg Intravenous Given 9/29/24 0534)   sodium chloride 0.9% bolus 2,000 mL 2,000 mL (0 mLs Intravenous Stopped 9/29/24 0806)   potassium bicarbonate disintegrating tablet 25 mEq (25 mEq Oral Given 9/29/24 0631)   magnesium sulfate in dextrose IVPB (premix) 1 g (0 g Intravenous Stopped 9/29/24 0806)   iohexoL (OMNIPAQUE 350) injection 100 mL (100 mLs Intravenous Given 9/29/24 0843)   0.9% NaCl 1,000 mL with mvi, (ADULT) no.4 with vit K 3,300 unit- 150 mcg/10 " mL 10 mL infusion ( Intravenous New Bag 9/29/24 0915)     And   folic acid 1 mg in 0.9% NaCl 100 mL IVPB (0 mg Intravenous Stopped 9/29/24 1010)     And   thiamine (B-1) 100 mg in D5W 100 mL IVPB (0 mg Intravenous Stopped 9/29/24 1010)   piperacillin-tazobactam (ZOSYN) 4.5 g in D5W 100 mL IVPB (MB+) (0 g Intravenous Stopped 9/29/24 0958)   LORazepam (ATIVAN) injection 2 mg (2 mg Intravenous Given 9/29/24 0832)   potassium bicarbonate disintegrating tablet 25 mEq (25 mEq Oral Given 9/29/24 0957)   aspirin tablet 325 mg (325 mg Oral Given 9/29/24 1234)   LORazepam (ATIVAN) injection 2 mg (2 mg Intravenous Given 9/29/24 1234)     Medical Decision Making  43-year-old male with a past medical history significant for alcohol abuse, anxiety disorder and hiatal hernia presents to the emergency department complaining of alcohol withdrawal.  Patient states it has been less than 24 hours since he had an alcoholic beverage, but states he has experienced persistent nausea, tremors and vomiting and has not been able to hold down solids or liquids by mouth.  He denies fever/chills/chest pain/shortness breath.  Course of ED stay:   CBC, CMP were ordered.  EKG shows sinus tachycardia without any other acute findings.  Heart rate improved to 140s after placement in exam room.  Normal saline bolus was ordered as well as haloperidol.  Care of this patient was transferred to Dr. Barone at shift change pending lab results/reassessment/disposition.    Amount and/or Complexity of Data Reviewed  Labs: ordered.    Risk  Prescription drug management.               ED Course as of 09/29/24 1253   Sun Sep 29, 2024   0600 Assumed care of patient at shift change.  Patient with past medical history of alcohol addiction, anxiety, and hiatal hernia presents to the ED with chief complaint of nausea vomiting and diarrhea for 1 week.  Patient is no longer nauseated.  Patient is sucking on ice chips at this time.  Patient reports he has had nausea and  vomiting for the last week.  Patient states he has not been able to drank alcohol since yesterday morning secondary to nausea and vomiting.  Patient also reports having diarrhea intermittently throughout the week.  Patient states he is having severe chills this morning.  Patient reports he usually drinks a pt of vodka a day.  Patient states he has had alcohol withdrawals in the past.  Remains tachycardic.  [RF]   0638 EKG interpreted by Dr. Barone.  No STEMI.  Sinus tachycardia with ventricular rate of 152.  Rightward axis.  Abnormal EKG.  QTC prolonged at 527.  When compared to previous EKG obtained on 06/21/2024 rate has increased by 65 beats per minute  [RF]   0640 Anion gap is 32 [RF]   1141 I reassessed patient.  Patient with heart rate of 141 when I arrived in the room.  Patient states he continues to feel lethargic and shaky.  Patient is now agreeable to admission to the hospital. [RF]      ED Course User Index  [RF] Juliet Barone DO                       Medical Decision Making:    This is an evaluation of a 43 y.o. male that presents to the Emergency Department for   Chief Complaint   Patient presents with    Alcolhol withdrawal     Pt c/o nausea/vomiting and weakness due to alcohol withdrawal; has not had a drink since yesterday morning       Independent historian: (parent/ EMS/ spouse/ etc) patient's father is at bedside.  Reports patient has recently seen cardiologist.        Based on the patient's symptoms, I am considering and evaluating for the following differential diagnoses:  Sepsis, dehydration, renal failure, lactic acidosis, alcohol withdrawal, intra-abdominal infection, pneumonia, and UTI    Consider hospitalization for:  Alcohol withdrawal syndrome    Patient is agreeable to transfer and admission to [facility].    ED Course:Treatment in the ED included Physical Exam and medications given in ED  Medications   lactated ringers bolus 250 mL (250 mLs Intravenous New Bag 9/29/24  1230)   haloperidol lactate injection 5 mg (5 mg Intravenous Given 9/29/24 0534)   sodium chloride 0.9% bolus 2,000 mL 2,000 mL (0 mLs Intravenous Stopped 9/29/24 0806)   potassium bicarbonate disintegrating tablet 25 mEq (25 mEq Oral Given 9/29/24 0631)   magnesium sulfate in dextrose IVPB (premix) 1 g (0 g Intravenous Stopped 9/29/24 0806)   iohexoL (OMNIPAQUE 350) injection 100 mL (100 mLs Intravenous Given 9/29/24 0843)   0.9% NaCl 1,000 mL with mvi, (ADULT) no.4 with vit K 3,300 unit- 150 mcg/10 mL 10 mL infusion ( Intravenous New Bag 9/29/24 0915)     And   folic acid 1 mg in 0.9% NaCl 100 mL IVPB (0 mg Intravenous Stopped 9/29/24 1010)     And   thiamine (B-1) 100 mg in D5W 100 mL IVPB (0 mg Intravenous Stopped 9/29/24 1010)   piperacillin-tazobactam (ZOSYN) 4.5 g in D5W 100 mL IVPB (MB+) (0 g Intravenous Stopped 9/29/24 0958)   LORazepam (ATIVAN) injection 2 mg (2 mg Intravenous Given 9/29/24 0832)   potassium bicarbonate disintegrating tablet 25 mEq (25 mEq Oral Given 9/29/24 0957)   aspirin tablet 325 mg (325 mg Oral Given 9/29/24 1234)   LORazepam (ATIVAN) injection 2 mg (2 mg Intravenous Given 9/29/24 1234)   .   Patient reports NOT feeling better after treatment in the ER.       External Data/Documents Reviewed: Previous medical records and vital signs reviewed, see HPI and Physical exam.   Labs: ordered and reviewed.  Lactic acid elevated at 6.73.  Anion gap elevated at 32.  Potassium low at 3.5.  Troponin elevated at 0.08.  White blood cell count elevated at 19.9.  Radiology: ordered as indicated and reviewed.  No pneumothorax.  ECG/medicine tests: ordered and independent interpretation performed by Dr. Juliet Barone DO. Decision-making details documented in ED Course.   Cardiac monitor placed for tachycardia. Monitor shows Sinus Tachycardia with  rate of 141. Interpreted by Dr. Juliet Barone DO.    Risk  Diagnosis or treatment significantly limited by the following social determinants of health: Body  mass index is 32.48 kg/m².     In shared decision making with the patient, we discussed treatment, prescriptions, labs, and imaging results.  Patient and family are agreeable to hospital admission at [facility].      I contacted  at time 11:50 a.m., requesting consult with hospitalist for admission. Requesting consultation with Internal Medicine for services not available at this facility.   Discussed patient's presentation, past medical history, physical exam, labs, radiology results, vital signs, and ED course.      Patient accepted by  Dr Mckinley for transfer and admission at time 12:53 pm   At this time patient will be transferred & admitted.  Patient will be transferred via EMS to accepting facility.      At time of transfer patient is awake alert oriented x4 speaking clearly in full sentences and moving all 4 extremities.     The following labs and imaging were reviewed:    Admission on 09/29/2024   Component Date Value Ref Range Status    Albumin, POC 09/29/2024 4.8  3.3 - 5.5 g/dL Final    Alkaline Phosphatase, POC 09/29/2024 100  42 - 141 U/L Final    ALT (SGPT), POC 09/29/2024 99 (H)  10 - 47 U/L Final    AST (SGOT), POC 09/29/2024 119 (H)  11 - 38 U/L Final    POC BUN 09/29/2024 19  7 - 22 mg/dL Final    Calcium, POC 09/29/2024 9.6  8.0 - 10.3 mg/dL Final    POC Chloride 09/29/2024 90 (L)  98 - 108 mmol/L Final    POC Creatinine 09/29/2024 1.2  0.6 - 1.2 mg/dL Final    POC Glucose 09/29/2024 218 (H)  73 - 118 mg/dL Final    POC Potassium 09/29/2024 3.5 (L)  3.6 - 5.1 mmol/L Final    POC Sodium 09/29/2024 139  128 - 145 mmol/L Final    Bilirubin, POC 09/29/2024 2.7 (H)  0.2 - 1.6 mg/dL Final    POC TCO2 09/29/2024 17 (L)  18 - 33 mmol/L Final    Protein, POC 09/29/2024 8.2 (H)  6.4 - 8.1 g/dL Final    POC PH 09/29/2024 7.394  7.35 - 7.45 Final    POC PCO2 09/29/2024 27.3 (L)  35 - 45 mmHg Final    POC PO2 09/29/2024 104 (HH)  40 - 60 mmHg Final    POC HCO3 09/29/2024 16.7 (L)  24 - 28  mmol/L Final    POC BE 09/29/2024 -6 (L)  -2 to 2 mmol/L Final    POC SATURATED O2 09/29/2024 98  95 - 100 % Final    POC Lactate 09/29/2024 6.73 (HH)  0.5 - 2.2 mmol/L Final    POC TCO2 09/29/2024 18 (L)  24 - 29 mmol/L Final    Sample 09/29/2024 VENOUS   Final    Site 09/29/2024 Other   Final    Allens Test 09/29/2024 N/A   Final    POC Cardiac Troponin I 09/29/2024 0.06  0.00 - 0.08 ng/mL Final    Sample 09/29/2024 unknown   Final    Comment: A single negative troponin is insufficient to rule out myocardial infarction.  The use of a serial sampling protocol is recommended practice. Correlate results with reference intervals established for methodology used. Point of care and core laboratory   troponin results are not interchangeable.      Albumin, POC 09/29/2024 4.9  3.3 - 5.5 g/dL Final    Alkaline Phosphatase, POC 09/29/2024 88  42 - 141 U/L Final    ALT (SGPT), POC 09/29/2024 100 (H)  10 - 47 U/L Final    Amylase, POC 09/29/2024 51  14 - 97 U/L Final    AST (SGOT), POC 09/29/2024 122 (H)  11 - 38 U/L Final    POC GGT 09/29/2024 641 (H)  5 - 65 U/L Final    Bilirubin, POC 09/29/2024 2.7 (H)  0.2 - 1.6 mg/dL Final    Protein, POC 09/29/2024 8.2 (H)  6.4 - 8.1 g/dL Final    POC PH 09/29/2024 7.400  7.35 - 7.45 Final    POC PCO2 09/29/2024 31.2 (L)  35 - 45 mmHg Final    POC PO2 09/29/2024 64 (HH)  40 - 60 mmHg Final    POC HCO3 09/29/2024 19.3 (L)  24 - 28 mmol/L Final    POC BE 09/29/2024 -4 (L)  -2 to 2 mmol/L Final    POC SATURATED O2 09/29/2024 92  95 - 100 % Final    POC Lactate 09/29/2024 3.29 (H)  0.5 - 2.2 mmol/L Final    POC TCO2 09/29/2024 20 (L)  24 - 29 mmol/L Final    Sample 09/29/2024 VENOUS   Final    Site 09/29/2024 Other   Final    Allens Test 09/29/2024 N/A   Final    POC Cardiac Troponin I 09/29/2024 0.08  0.00 - 0.08 ng/mL Final    Sample 09/29/2024 unknown   Final    Comment: A single negative troponin is insufficient to rule out myocardial infarction.  The use of a serial sampling protocol  is recommended practice. Correlate results with reference intervals established for methodology used. Point of care and core laboratory   troponin results are not interchangeable.      Albumin, POC 09/29/2024 4.0  3.3 - 5.5 g/dL Final    Alkaline Phosphatase, POC 09/29/2024 77  42 - 141 U/L Final    ALT (SGPT), POC 09/29/2024 89 (H)  10 - 47 U/L Final    AST (SGOT), POC 09/29/2024 108 (H)  11 - 38 U/L Final    POC BUN 09/29/2024 16  7 - 22 mg/dL Final    Calcium, POC 09/29/2024 8.1  8.0 - 10.3 mg/dL Final    POC Chloride 09/29/2024 97 (L)  98 - 108 mmol/L Final    POC Creatinine 09/29/2024 1.0  0.6 - 1.2 mg/dL Final    POC Glucose 09/29/2024 165 (H)  73 - 118 mg/dL Final    POC Potassium 09/29/2024 3.9  3.6 - 5.1 mmol/L Final    POC Sodium 09/29/2024 134  128 - 145 mmol/L Final    Bilirubin, POC 09/29/2024 2.4 (H)  0.2 - 1.6 mg/dL Final    POC TCO2 09/29/2024 22  18 - 33 mmol/L Final    Protein, POC 09/29/2024 6.6  6.4 - 8.1 g/dL Final    Glucose, UA 09/29/2024 Negative  Negative Final    Bilirubin, UA 09/29/2024 1+ (A)  Negative Final    Ketones, UA 09/29/2024 3+ (A)  Negative Final    Spec Grav UA 09/29/2024 >=1.030 (>)  1.005 - 1.030 Final    Blood, UA 09/29/2024 2+ (A)  Negative Final    PH, UA 09/29/2024 6.0  5.0 - 8.0 Final    Protein, UA 09/29/2024 2+ (A)  Negative Final    Urobilinogen, UA 09/29/2024 1.0  <=1.0 E.U./dL Final    Nitrite, UA 09/29/2024 Negative  Negative Final    Leukocytes, UA 09/29/2024 Negative  Negative Final    Color, UA POC 09/29/2024 Red  Yellow, Straw, Alicja Final    Clarity, UA, POC 09/29/2024 Slightly Cloudy  Clear Final        Imaging Results              CT Abdomen Pelvis With IV Contrast NO Oral Contrast (Final result)  Result time 09/29/24 10:48:16      Final result by Shyann Harris MD (09/29/24 10:48:16)                   Impression:      Findings compatible with hepatic steatosis.  Otherwise normal exam.      Electronically signed by: Shyann Harris,  "MD  Date:    09/29/2024  Time:    10:48               Narrative:    EXAMINATION:  CT ABDOMEN PELVIS WITH IV CONTRAST    CLINICAL HISTORY:  Abdominal abscess/infection suspected;Nausea/vomiting;    TECHNIQUE:  Low dose axial images, sagittal and coronal reformations were obtained from the lung bases to the pubic symphysis following the IV administration of 100 mL of Omnipaque 350 .  Oral contrast was not given.    COMPARISON:  None.    FINDINGS:  Lung bases: Evaluation of the visualized portions of the lungs, heart and pericardium show no abnormalities.    Gastrointestinal tract: There is high density material admixed with stool in the rectosigmoid colon.  Otherwise the large and small intestines are normal.  The appendix is normal.  There is a small hiatal hernia.  Otherwise the stomach is normal.    Genitourinary system: The kidneys, ureters and bladder are normal.    Although the liver measures 18 cm it does not extend beyond the costal margin.  It is diffusely hypodense when compared to the spleen.  There are no focal hepatic lesions.  The hepatic and portal veins are patent.  The spleen, pancreas, adrenal glands and prostate are normal.    The abdominal aorta tapers normally.  There is a retroaortic left renal vein.    Peritoneum/retroperitoneum: No free fluid, free air or lymphadenopathy.    Osseous and soft tissue structures: No suspicious osteolytic or osteoblastic lesions.  The soft tissues are normal.                                       X-Ray Chest AP Portable (Final result)  Result time 09/29/24 08:57:11      Final result by Johnathan Trejo MD (09/29/24 08:57:11)                   Impression:      No acute cardiopulmonary finding identified on this rotation limited single view study.      Electronically signed by: Johnathan Trejo MD  Date:    09/29/2024  Time:    08:57               Narrative:    EXAMINATION:  XR CHEST AP PORTABLE    CLINICAL HISTORY:  Provided history is "  Tachycardia, " "unspecified".    TECHNIQUE:  One view of the chest.    COMPARISON:  02/17/2022.    FINDINGS:  Patient is rotated.  Cardiomediastinal silhouette is not significantly enlarged.  No confluent area of consolidation.  No large pleural effusion.  No pneumothorax.                                          Clinical Impression:  Final diagnoses:  [R00.0] Tachycardia  [E86.0] Dehydration, severe (Primary)  [F10.939] Alcohol withdrawal syndrome with complication  [E87.6] Hypokalemia  [F10.939] Alcohol withdrawal          ED Disposition Condition    Admit Stable                Juliet Barone DO  09/29/24 1253    "

## 2024-09-29 NOTE — ED NOTES
Tai Ramires father called to notify him of admission to [facility] ICU Rm 278

## 2024-09-30 LAB
ALBUMIN SERPL BCP-MCNC: 3.4 G/DL (ref 3.5–5.2)
ALP SERPL-CCNC: 77 U/L (ref 55–135)
ALT SERPL W/O P-5'-P-CCNC: 99 U/L (ref 10–44)
ANION GAP SERPL CALC-SCNC: 14 MMOL/L (ref 8–16)
AST SERPL-CCNC: 111 U/L (ref 10–40)
BASOPHILS # BLD AUTO: 0.01 K/UL (ref 0–0.2)
BASOPHILS NFR BLD: 0.2 % (ref 0–1.9)
BILIRUB SERPL-MCNC: 3.2 MG/DL (ref 0.1–1)
BUN SERPL-MCNC: 13 MG/DL (ref 6–20)
CALCIUM SERPL-MCNC: 8.4 MG/DL (ref 8.7–10.5)
CHLORIDE SERPL-SCNC: 100 MMOL/L (ref 95–110)
CO2 SERPL-SCNC: 25 MMOL/L (ref 23–29)
CREAT SERPL-MCNC: 0.9 MG/DL (ref 0.5–1.4)
DIFFERENTIAL METHOD BLD: ABNORMAL
EOSINOPHIL # BLD AUTO: 0 K/UL (ref 0–0.5)
EOSINOPHIL NFR BLD: 0.2 % (ref 0–8)
ERYTHROCYTE [DISTWIDTH] IN BLOOD BY AUTOMATED COUNT: 13.3 % (ref 11.5–14.5)
EST. GFR  (NO RACE VARIABLE): >60 ML/MIN/1.73 M^2
GLUCOSE SERPL-MCNC: 102 MG/DL (ref 70–110)
HCT VFR BLD AUTO: 40.8 % (ref 40–54)
HGB BLD-MCNC: 13.7 G/DL (ref 14–18)
IMM GRANULOCYTES # BLD AUTO: 0.02 K/UL (ref 0–0.04)
IMM GRANULOCYTES NFR BLD AUTO: 0.4 % (ref 0–0.5)
LYMPHOCYTES # BLD AUTO: 1.6 K/UL (ref 1–4.8)
LYMPHOCYTES NFR BLD: 28.3 % (ref 18–48)
MAGNESIUM SERPL-MCNC: 2 MG/DL (ref 1.6–2.6)
MCH RBC QN AUTO: 28.7 PG (ref 27–31)
MCHC RBC AUTO-ENTMCNC: 33.6 G/DL (ref 32–36)
MCV RBC AUTO: 85 FL (ref 82–98)
MONOCYTES # BLD AUTO: 0.4 K/UL (ref 0.3–1)
MONOCYTES NFR BLD: 6.3 % (ref 4–15)
NEUTROPHILS # BLD AUTO: 3.7 K/UL (ref 1.8–7.7)
NEUTROPHILS NFR BLD: 64.6 % (ref 38–73)
NRBC BLD-RTO: 0 /100 WBC
PHOSPHATE SERPL-MCNC: <1 MG/DL (ref 2.7–4.5)
PLATELET # BLD AUTO: 88 K/UL (ref 150–450)
PMV BLD AUTO: 9.6 FL (ref 9.2–12.9)
POTASSIUM SERPL-SCNC: 3.1 MMOL/L (ref 3.5–5.1)
PROT SERPL-MCNC: 6.3 G/DL (ref 6–8.4)
RBC # BLD AUTO: 4.78 M/UL (ref 4.6–6.2)
SODIUM SERPL-SCNC: 139 MMOL/L (ref 136–145)
WBC # BLD AUTO: 5.69 K/UL (ref 3.9–12.7)

## 2024-09-30 PROCEDURE — 83735 ASSAY OF MAGNESIUM: CPT | Performed by: STUDENT IN AN ORGANIZED HEALTH CARE EDUCATION/TRAINING PROGRAM

## 2024-09-30 PROCEDURE — 25000003 PHARM REV CODE 250: Performed by: STUDENT IN AN ORGANIZED HEALTH CARE EDUCATION/TRAINING PROGRAM

## 2024-09-30 PROCEDURE — 36415 COLL VENOUS BLD VENIPUNCTURE: CPT | Performed by: STUDENT IN AN ORGANIZED HEALTH CARE EDUCATION/TRAINING PROGRAM

## 2024-09-30 PROCEDURE — 63600175 PHARM REV CODE 636 W HCPCS: Performed by: STUDENT IN AN ORGANIZED HEALTH CARE EDUCATION/TRAINING PROGRAM

## 2024-09-30 PROCEDURE — 25000003 PHARM REV CODE 250: Performed by: SURGERY

## 2024-09-30 PROCEDURE — 80053 COMPREHEN METABOLIC PANEL: CPT | Performed by: STUDENT IN AN ORGANIZED HEALTH CARE EDUCATION/TRAINING PROGRAM

## 2024-09-30 PROCEDURE — 21400001 HC TELEMETRY ROOM

## 2024-09-30 PROCEDURE — 85025 COMPLETE CBC W/AUTO DIFF WBC: CPT | Performed by: STUDENT IN AN ORGANIZED HEALTH CARE EDUCATION/TRAINING PROGRAM

## 2024-09-30 PROCEDURE — 84100 ASSAY OF PHOSPHORUS: CPT | Performed by: STUDENT IN AN ORGANIZED HEALTH CARE EDUCATION/TRAINING PROGRAM

## 2024-09-30 RX ORDER — MUPIROCIN 20 MG/G
OINTMENT TOPICAL 2 TIMES DAILY
Status: DISCONTINUED | OUTPATIENT
Start: 2024-09-30 | End: 2024-10-02 | Stop reason: HOSPADM

## 2024-09-30 RX ORDER — DIAZEPAM 5 MG/1
5 TABLET ORAL EVERY 8 HOURS
Status: DISCONTINUED | OUTPATIENT
Start: 2024-09-30 | End: 2024-10-02

## 2024-09-30 RX ORDER — SODIUM,POTASSIUM PHOSPHATES 280-250MG
1 POWDER IN PACKET (EA) ORAL
Status: COMPLETED | OUTPATIENT
Start: 2024-09-30 | End: 2024-09-30

## 2024-09-30 RX ADMIN — DIAZEPAM 10 MG: 5 TABLET ORAL at 05:09

## 2024-09-30 RX ADMIN — POTASSIUM BICARBONATE 25 MEQ: 977.5 TABLET, EFFERVESCENT ORAL at 08:09

## 2024-09-30 RX ADMIN — DIAZEPAM 5 MG: 5 TABLET ORAL at 09:09

## 2024-09-30 RX ADMIN — MUPIROCIN: 20 OINTMENT TOPICAL at 11:09

## 2024-09-30 RX ADMIN — ONDANSETRON 4 MG: 2 INJECTION INTRAMUSCULAR; INTRAVENOUS at 11:09

## 2024-09-30 RX ADMIN — ONDANSETRON 4 MG: 2 INJECTION INTRAMUSCULAR; INTRAVENOUS at 07:09

## 2024-09-30 RX ADMIN — BUSPIRONE HYDROCHLORIDE 30 MG: 10 TABLET ORAL at 08:09

## 2024-09-30 RX ADMIN — PANTOPRAZOLE SODIUM 40 MG: 40 TABLET, DELAYED RELEASE ORAL at 08:09

## 2024-09-30 RX ADMIN — Medication 1 PACKET: at 05:09

## 2024-09-30 RX ADMIN — Medication 1 PACKET: at 08:09

## 2024-09-30 RX ADMIN — Medication 1 PACKET: at 04:09

## 2024-09-30 RX ADMIN — ENOXAPARIN SODIUM 40 MG: 40 INJECTION SUBCUTANEOUS at 04:09

## 2024-09-30 RX ADMIN — THIAMINE HCL TAB 100 MG 100 MG: 100 TAB at 08:09

## 2024-09-30 RX ADMIN — ARIPIPRAZOLE 5 MG: 5 TABLET ORAL at 08:09

## 2024-09-30 RX ADMIN — SERTRALINE HYDROCHLORIDE 100 MG: 50 TABLET ORAL at 08:09

## 2024-09-30 RX ADMIN — MUPIROCIN: 20 OINTMENT TOPICAL at 08:09

## 2024-09-30 RX ADMIN — ACETAMINOPHEN 650 MG: 325 TABLET ORAL at 07:09

## 2024-09-30 RX ADMIN — DIAZEPAM 5 MG: 5 TABLET ORAL at 01:09

## 2024-09-30 NOTE — HOSPITAL COURSE
Mr. Ramires is a 43-year-old male who was admitted with alcohol withdrawal.  Started on IV fluids, p.o. Valium taper and antiemetics. Slowly improving, replacing electrolytes. Initially in ICU for unstable vitals, now improving. Stable for transfer to floor. Remains stable. Tolerating diet. Weaned valium. Stable for discharge to home with valium taper. Instructed to hold home xanax while completing valium taper. Referral placed to Hepatology for follow up of abnormal LFTs most likely due to alcohol.

## 2024-09-30 NOTE — PROVIDER TRANSFER
Mr. Ramires is a 43-year-old male who was admitted with alcohol withdrawals.  Started on IV fluids, p.o. Valium taper.  Slowly improving.  Replacing electrolytes.  Stable for transfer to floor.  Weaned Valium to 5 mg p.o. Q 8 however may need to increase.    Evaristo Mckinley MD  Department of Hospital Medicine  [facility]

## 2024-09-30 NOTE — NURSING TRANSFER
Nursing Transfer Note      9/30/2024   5:37 PM    Nurse giving handoff:Jennifer  Nurse receiving handoff:Nusrat    Reason patient is being transferred: no longer needs ICU    Transfer From: ICU    Transfer via wheelchair    Transfer with cardiac monitoring    Transported by Nurse    Transfer Vital Signs:  Blood Pressure:122/73  Heart Rate:106  O2:98  Temperature:98.5  Respirations:22    Telemetry: Box Number [id number]  Order for Tele Monitor? Yes    Additional Lines: N/A    Medicines sent: yes    Any special needs or follow-up needed: N/A    Patient belongings transferred with patient: Yes    Chart send with patient: Yes    Notified: Father    Patient reassessed at: 09/30/24 (date, time)  1  Upon arrival to floor: cardiac monitor applied

## 2024-09-30 NOTE — NURSING
[facility]  Nurses Note -- 4 Eyes      9/30/2024       Skin assessed on: Q Shift      [x] No Pressure Injuries Present    [x]Prevention Measures Documented    [] Yes LDA  for Pressure Injury Previously documented     [] Yes New Pressure Injury Discovered   [] LDA for New Pressure Injury Added      Attending RN:  Jennifer Bernstein, RN     Second RN:  Cierra

## 2024-09-30 NOTE — NURSING
[facility]  Nurses Note -- 4 Eyes      9/29/2024       Skin assessed on: Q Shift      [x] No Pressure Injuries Present    [x]Prevention Measures Documented    [] Yes LDA  for Pressure Injury Previously documented     [] Yes New Pressure Injury Discovered   [] LDA for New Pressure Injury Added      Attending RN:  Cierra Galvez RN     Second RN:  BRENDAN Rodriguez

## 2024-09-30 NOTE — PROGRESS NOTES
[facility]  Progress Note    Patient Name: Ruthy Ramires  MRN: [id number]  Patient Class: IP- Inpatient   Admission Date: 9/29/2024  Length of Stay: 1 days  Attending Physician: Evaristo Mckinley MD  Primary Care Provider: Fanny Arshad NP        Subjective:     Principal Problem:Alcohol withdrawal        HPI:  Mr. Ramires is a 44 yo M with hx of anxiety disorder, depression, alcohol use disorder and hiatal hernia who presents to the [facility] ED for evaluation of nausea, vomiting and weakness. He reports drinking a fifth of vodka daily, last drink yesterday morning per ED staff. Patient has his eyes closed and not participating in my encounter. He has received 6 mg IV ativan prior to this. He did wake up for nurse some and answered questions. All information is taken from chart and other medical staff.  In the ED, he was tachycardic with heart rate in 150s, /92 and oxygen saturation 97% on room air. He was given IVF, IV ativan wth mild improvement. Given concerns for his alcohol withdrawals and heart rate, he will be admitted to ICU for further management.    Overview/Hospital Course:  Mr. Ramires is a 43-year-old male who was admitted with alcohol withdrawal.  Started on IV fluids, p.o. Valium taper and antiemetics. Slowly improving, replacing electrolytes. Initially in ICU for unstable vitals, now improving. Stable for transfer to floor.    Interval History:  Heart rate is improved, feeling better.  Some nausea still.  Feels a little lethargic.    Review of Systems  Objective:     Vital Signs (Most Recent):  Temp: 97.9 °F (36.6 °C) (09/30/24 1115)  Pulse: 110 (09/30/24 1100)  Resp: 12 (09/30/24 1100)  BP: 138/85 (09/30/24 1100)  SpO2: 96 % (09/30/24 1000) Vital Signs (24h Range):  Temp:  [97.9 °F (36.6 °C)-99.1 °F (37.3 °C)] 97.9 °F (36.6 °C)  Pulse:  [] 110  Resp:  [12-38] 12  SpO2:  [87 %-98 %] 96 %  BP: (100-157)/() 138/85     Weight: 109.2  kg (240 lb 11.9 oz)  Body mass index is 30.91 kg/m².    Intake/Output Summary (Last 24 hours) at 9/30/2024 1121  Last data filed at 9/30/2024 0937  Gross per 24 hour   Intake 250 ml   Output 750 ml   Net -500 ml         Physical Exam  Vitals and nursing note reviewed.   Constitutional:       General: He is not in acute distress.     Appearance: Normal appearance. He is ill-appearing.   Cardiovascular:      Rate and Rhythm: Normal rate and regular rhythm.      Pulses: Normal pulses.   Pulmonary:      Effort: Pulmonary effort is normal.   Abdominal:      General: Bowel sounds are normal. There is no distension.   Musculoskeletal:         General: No swelling.   Skin:     General: Skin is warm.   Neurological:      General: No focal deficit present.      Mental Status: He is alert and oriented to person, place, and time.             Significant Labs: All pertinent labs within the past 24 hours have been reviewed.    Significant Imaging: I have reviewed all pertinent imaging results/findings within the past 24 hours.    Assessment/Plan:      * Alcohol withdrawal  Presents with alcohol withdrawals, tremors, tachycardia and nausea/vomiting. Last drink 9/28 in the am. Given IVF, IV vitamins and multiple doses of IV ativan in ED. Start on scheduled PO valium. Takes xanax at home as needed.  - CIWA protocol  - clear liquids - advance as tolerated  - PRN ativan for CIWA >8  - thiamine      Elevated liver enzymes  Elevated enzymes, suspect due to alcohol use  - MDF only 4.2      Tachycardia  - due to withdrawals, treat supportively  Improving      Moderate episode of recurrent major depressive disorder  Stable, resume home medications      JOVANNI (generalized anxiety disorder)  - reviewed Psychiatry notes and resumed home medications        VTE Risk Mitigation (From admission, onward)           Ordered     enoxaparin injection 40 mg  Daily         09/29/24 1353     IP VTE HIGH RISK PATIENT  Once         09/29/24 1353     Place  sequential compression device  Until discontinued         09/29/24 3342                    Discharge Planning   MOIRA:      Code Status: Full Code   Is the patient medically ready for discharge?:     Reason for patient still in hospital (select all that apply): Treatment               Critical care time spent on the evaluation and treatment of severe organ dysfunction, review of pertinent labs and imaging studies, discussions with consulting providers and discussions with patient/family: 20 minutes.      Evaristo Mckinley MD  Department of Hospital Medicine   [facility]

## 2024-09-30 NOTE — NURSING
[facility]  Nurses Note -- 4 Eyes      9/30/2024       Skin assessed on: Transfer      [x] No Pressure Injuries Present    []Prevention Measures Documented    [] Yes LDA  for Pressure Injury Previously documented     [] Yes New Pressure Injury Discovered   [] LDA for New Pressure Injury Added      Attending RN:  Nusrat Whitmore LPN     Second RN:  Jennifer CARDONA         spouse

## 2024-09-30 NOTE — EICU
eICU Physician Virtual/Remote Brief Evaluation Note      Telephone call bedside RN   Potassium 3.1 in no replacement orders  Also has low phosphorus but oral replacement has been ordered   Admitted for ethanol withdrawal  Chart reviewed, discussed with RN   /62, P 111 NSR no arrhythmias, RR 21, O2 sat 95   Potassium bicarbonate 25 mg b.i.d. x2 doses ordered      MICHELLE Head MD  eICU Attending  [phone]    This report has been created through the use of M-Modal dictation software. Typographical and content errors may occur with this process. While efforts are made to detect and correct such errors, in some cases errors will persist. For this reason, wording in this document should be considered in the proper context and not strictly verbatim

## 2024-09-30 NOTE — SUBJECTIVE & OBJECTIVE
Interval History:  Heart rate is improved, feeling better.  Some nausea still.  Feels a little lethargic.    Review of Systems  Objective:     Vital Signs (Most Recent):  Temp: 97.9 °F (36.6 °C) (09/30/24 1115)  Pulse: 110 (09/30/24 1100)  Resp: 12 (09/30/24 1100)  BP: 138/85 (09/30/24 1100)  SpO2: 96 % (09/30/24 1000) Vital Signs (24h Range):  Temp:  [97.9 °F (36.6 °C)-99.1 °F (37.3 °C)] 97.9 °F (36.6 °C)  Pulse:  [] 110  Resp:  [12-38] 12  SpO2:  [87 %-98 %] 96 %  BP: (100-157)/() 138/85     Weight: 109.2 kg (240 lb 11.9 oz)  Body mass index is 30.91 kg/m².    Intake/Output Summary (Last 24 hours) at 9/30/2024 1121  Last data filed at 9/30/2024 0937  Gross per 24 hour   Intake 250 ml   Output 750 ml   Net -500 ml         Physical Exam  Vitals and nursing note reviewed.   Constitutional:       General: He is not in acute distress.     Appearance: Normal appearance. He is ill-appearing.   Cardiovascular:      Rate and Rhythm: Normal rate and regular rhythm.      Pulses: Normal pulses.   Pulmonary:      Effort: Pulmonary effort is normal.   Abdominal:      General: Bowel sounds are normal. There is no distension.   Musculoskeletal:         General: No swelling.   Skin:     General: Skin is warm.   Neurological:      General: No focal deficit present.      Mental Status: He is alert and oriented to person, place, and time.             Significant Labs: All pertinent labs within the past 24 hours have been reviewed.    Significant Imaging: I have reviewed all pertinent imaging results/findings within the past 24 hours.

## 2024-10-01 PROBLEM — R00.0 TACHYCARDIA: Status: RESOLVED | Noted: 2024-06-13 | Resolved: 2024-10-01

## 2024-10-01 PROBLEM — E83.39 HYPOPHOSPHATEMIA: Status: ACTIVE | Noted: 2024-10-01

## 2024-10-01 PROBLEM — D62 ACUTE BLOOD LOSS ANEMIA: Status: RESOLVED | Noted: 2020-04-20 | Resolved: 2024-10-01

## 2024-10-01 PROBLEM — D69.6 THROMBOCYTOPENIA: Status: ACTIVE | Noted: 2024-10-01

## 2024-10-01 PROBLEM — K70.10 ALCOHOLIC HEPATITIS WITHOUT ASCITES: Status: ACTIVE | Noted: 2024-09-29

## 2024-10-01 PROBLEM — D64.9 NORMOCYTIC ANEMIA: Status: ACTIVE | Noted: 2024-10-01

## 2024-10-01 PROBLEM — K92.2 UPPER GI BLEED: Status: RESOLVED | Noted: 2020-04-20 | Resolved: 2024-10-01

## 2024-10-01 LAB
ALBUMIN SERPL BCP-MCNC: 3.4 G/DL (ref 3.5–5.2)
ALP SERPL-CCNC: 84 U/L (ref 55–135)
ALT SERPL W/O P-5'-P-CCNC: 113 U/L (ref 10–44)
ANION GAP SERPL CALC-SCNC: 12 MMOL/L (ref 8–16)
AST SERPL-CCNC: 139 U/L (ref 10–40)
BILIRUB SERPL-MCNC: 2.9 MG/DL (ref 0.1–1)
BUN SERPL-MCNC: 8 MG/DL (ref 6–20)
CALCIUM SERPL-MCNC: 8.8 MG/DL (ref 8.7–10.5)
CHLORIDE SERPL-SCNC: 97 MMOL/L (ref 95–110)
CO2 SERPL-SCNC: 28 MMOL/L (ref 23–29)
CREAT SERPL-MCNC: 0.7 MG/DL (ref 0.5–1.4)
EST. GFR  (NO RACE VARIABLE): >60 ML/MIN/1.73 M^2
GLUCOSE SERPL-MCNC: 88 MG/DL (ref 70–110)
MAGNESIUM SERPL-MCNC: 1.9 MG/DL (ref 1.6–2.6)
OHS QRS DURATION: 74 MS
OHS QRS DURATION: 74 MS
OHS QTC CALCULATION: 417 MS
OHS QTC CALCULATION: 527 MS
PHOSPHATE SERPL-MCNC: 1.4 MG/DL (ref 2.7–4.5)
POTASSIUM SERPL-SCNC: 3.1 MMOL/L (ref 3.5–5.1)
PROT SERPL-MCNC: 6.4 G/DL (ref 6–8.4)
SODIUM SERPL-SCNC: 137 MMOL/L (ref 136–145)

## 2024-10-01 PROCEDURE — 84100 ASSAY OF PHOSPHORUS: CPT | Performed by: STUDENT IN AN ORGANIZED HEALTH CARE EDUCATION/TRAINING PROGRAM

## 2024-10-01 PROCEDURE — 63600175 PHARM REV CODE 636 W HCPCS: Performed by: HOSPITALIST

## 2024-10-01 PROCEDURE — 83735 ASSAY OF MAGNESIUM: CPT | Performed by: STUDENT IN AN ORGANIZED HEALTH CARE EDUCATION/TRAINING PROGRAM

## 2024-10-01 PROCEDURE — 63600175 PHARM REV CODE 636 W HCPCS: Performed by: STUDENT IN AN ORGANIZED HEALTH CARE EDUCATION/TRAINING PROGRAM

## 2024-10-01 PROCEDURE — 94761 N-INVAS EAR/PLS OXIMETRY MLT: CPT

## 2024-10-01 PROCEDURE — 25000003 PHARM REV CODE 250: Performed by: SURGERY

## 2024-10-01 PROCEDURE — 80053 COMPREHEN METABOLIC PANEL: CPT | Performed by: STUDENT IN AN ORGANIZED HEALTH CARE EDUCATION/TRAINING PROGRAM

## 2024-10-01 PROCEDURE — 25000003 PHARM REV CODE 250: Performed by: STUDENT IN AN ORGANIZED HEALTH CARE EDUCATION/TRAINING PROGRAM

## 2024-10-01 PROCEDURE — 11000001 HC ACUTE MED/SURG PRIVATE ROOM

## 2024-10-01 PROCEDURE — 25000003 PHARM REV CODE 250: Performed by: HOSPITALIST

## 2024-10-01 PROCEDURE — 36415 COLL VENOUS BLD VENIPUNCTURE: CPT | Performed by: STUDENT IN AN ORGANIZED HEALTH CARE EDUCATION/TRAINING PROGRAM

## 2024-10-01 RX ORDER — FOLIC ACID 1 MG/1
1 TABLET ORAL DAILY
Status: DISCONTINUED | OUTPATIENT
Start: 2024-10-01 | End: 2024-10-02 | Stop reason: HOSPADM

## 2024-10-01 RX ORDER — ONDANSETRON HYDROCHLORIDE 2 MG/ML
8 INJECTION, SOLUTION INTRAVENOUS EVERY 6 HOURS PRN
Status: DISCONTINUED | OUTPATIENT
Start: 2024-10-01 | End: 2024-10-02 | Stop reason: HOSPADM

## 2024-10-01 RX ORDER — PROCHLORPERAZINE EDISYLATE 5 MG/ML
10 INJECTION INTRAMUSCULAR; INTRAVENOUS EVERY 6 HOURS PRN
Status: DISCONTINUED | OUTPATIENT
Start: 2024-10-01 | End: 2024-10-02 | Stop reason: HOSPADM

## 2024-10-01 RX ADMIN — BUSPIRONE HYDROCHLORIDE 30 MG: 10 TABLET ORAL at 08:10

## 2024-10-01 RX ADMIN — DIAZEPAM 5 MG: 5 TABLET ORAL at 05:10

## 2024-10-01 RX ADMIN — PANTOPRAZOLE SODIUM 40 MG: 40 TABLET, DELAYED RELEASE ORAL at 08:10

## 2024-10-01 RX ADMIN — POTASSIUM PHOSPHATE, MONOBASIC AND POTASSIUM PHOSPHATE, DIBASIC 20 MMOL: 224; 236 INJECTION, SOLUTION, CONCENTRATE INTRAVENOUS at 10:10

## 2024-10-01 RX ADMIN — DIAZEPAM 5 MG: 5 TABLET ORAL at 02:10

## 2024-10-01 RX ADMIN — THERA TABS 1 TABLET: TAB at 02:10

## 2024-10-01 RX ADMIN — ARIPIPRAZOLE 5 MG: 5 TABLET ORAL at 09:10

## 2024-10-01 RX ADMIN — MUPIROCIN: 20 OINTMENT TOPICAL at 08:10

## 2024-10-01 RX ADMIN — POTASSIUM BICARBONATE 25 MEQ: 977.5 TABLET, EFFERVESCENT ORAL at 08:10

## 2024-10-01 RX ADMIN — SERTRALINE HYDROCHLORIDE 100 MG: 50 TABLET ORAL at 08:10

## 2024-10-01 RX ADMIN — THIAMINE HCL TAB 100 MG 100 MG: 100 TAB at 08:10

## 2024-10-01 RX ADMIN — BUSPIRONE HYDROCHLORIDE 30 MG: 10 TABLET ORAL at 09:10

## 2024-10-01 RX ADMIN — MUPIROCIN: 20 OINTMENT TOPICAL at 09:10

## 2024-10-01 RX ADMIN — DIAZEPAM 5 MG: 5 TABLET ORAL at 09:10

## 2024-10-01 RX ADMIN — FOLIC ACID 1 MG: 1 TABLET ORAL at 02:10

## 2024-10-01 RX ADMIN — ENOXAPARIN SODIUM 40 MG: 40 INJECTION SUBCUTANEOUS at 05:10

## 2024-10-01 RX ADMIN — ONDANSETRON 8 MG: 2 INJECTION INTRAMUSCULAR; INTRAVENOUS at 02:10

## 2024-10-01 NOTE — ASSESSMENT & PLAN NOTE
Elevated enzymes, suspect due to alcohol use  - MDF only 4.2 upon admission   - not started on steroids  - monitor CBC, CMP daily

## 2024-10-01 NOTE — ASSESSMENT & PLAN NOTE
Patient has Abnormal Phosphorus: hypophosphatemia. Will continue to monitor electrolytes closely. Will replace the affected electrolytes and repeat labs to be done after interventions completed. The patient's phosphorus results have been reviewed and are listed below.  Recent Labs   Lab 10/01/24  0458   PHOS 1.4*

## 2024-10-01 NOTE — ASSESSMENT & PLAN NOTE
Presented with alcohol withdrawals, tremors, tachycardia and nausea/vomiting. Last drink 9/28 in the am. Given IVF, IV vitamins and multiple doses of IV ativan in ED. Start on scheduled PO valium. Takes xanax at home as needed.  - CIWA protocol  - advance diet as tolerated  - continue valium 5mg Q8  - PRN ativan for CIWA >8  - thiamine, folate, multivitamin

## 2024-10-01 NOTE — NURSING
[facility]  Nurses Note -- 4 Eyes      9/30/24       Skin assessed on: Q Shift      [x] No Pressure Injuries Present    [x]Prevention Measures Documented    [] Yes LDA  for Pressure Injury Previously documented     [] Yes New Pressure Injury Discovered   [] LDA for New Pressure Injury Added      Attending RN:  Fabiano Barnard, BRENDAN     Second RN:  Nusrat Whitmore Lpn

## 2024-10-01 NOTE — PLAN OF CARE
Case Management Assessment     PCP: Fanny Arshad NP   Pharmacy:   [id number] - [place] - [address] [facility] [address] [facility] & [address]  [place] [address]  Phone: [phone] Fax: [phone]    [facility] - [place], [place] - [address]   [facility] [address]  Phone: [phone] Fax: [phone]    [id number] - [place], [place] - [address]  [address]  [facility] [address]  Phone: [phone] Fax: [phone]       Patient Arrived From: Home  Existing Help at Home: Pt is independent. [phone]    Barriers to Discharge: None     Discharge Plan:    A. Home   B. Home      CM met with pt at bedside to discuss discharge planning. Pt reside alone and does not use any DME. Pt has a history of substance abuse. CM asked pt if he would like substance abuse resources, Pt decline. Pt stated that he's been drinking fifth of alcohol daily.     Pt informed CM that does attend AA meeting and has a sponsor and he will continue these meeting once discharged.     Pt's father, Tai will provide transportation home upon discharge.      10/01/24 1016   Discharge Assessment   Assessment Type Discharge Planning Assessment   Confirmed/corrected address, phone number and insurance Yes   Confirmed Demographics Correct on Facesheet   Source of Information patient   When was your last doctors appointment? 09/04/24   Communicated MOIRA with patient/caregiver Yes   Reason For Admission Alcohol Withdrawal   People in Home alone   Facility Arrived From: Home   Do you expect to return to your current living situation? Yes   Do you have help at home or someone to help you manage your care at home? No   Prior to hospitilization cognitive status: Alert/Oriented   Current cognitive status: Alert/Oriented   Walking or Climbing Stairs Difficulty no   Dressing/Bathing Difficulty no   Equipment Currently Used at Home none   Readmission within 30 days? No   Patient  currently being followed by outpatient case management? No   Do you currently have service(s) that help you manage your care at home? No   Do you take prescription medications? Yes   Do you have prescription coverage? Yes   Coverage BCBS   Do you have any problems affording any of your prescribed medications? No   Who is going to help you get home at discharge? Father, Tai   How do you get to doctors appointments? car, drives self   Are you on dialysis? No   Do you take coumadin? No   Discharge Plan A Home   Discharge Plan B Home   DME Needed Upon Discharge  none   Discharge Plan discussed with: Patient   Transition of Care Barriers Substance Abuse   SDOH   (None)   Physical Activity   On average, how many days per week do you engage in moderate to strenuous exercise (like a brisk walk)? 0 days   On average, how many minutes do you engage in exercise at this level? 0 min   Financial Resource Strain   How hard is it for you to pay for the very basics like food, housing, medical care, and heating? Not hard   Housing Stability   In the last 12 months, was there a time when you were not able to pay the mortgage or rent on time? N   At any time in the past 12 months, were you homeless or living in a shelter (including now)? N   Transportation Needs   Has the lack of transportation kept you from medical appointments, meetings, work or from getting things needed for daily living? No   Food Insecurity   Within the past 12 months, you worried that your food would run out before you got the money to buy more. Never true   Within the past 12 months, the food you bought just didn't last and you didn't have money to get more. Never true   Stress   Do you feel stress - tense, restless, nervous, or anxious, or unable to sleep at night because your mind is troubled all the time - these days? Not at all   Social Isolation   How often do you feel lonely or isolated from those around you?  Never   Alcohol Use   Q1: How often do you  have a drink containing alcohol? 4 or more ti   Q2: How many drinks containing alcohol do you have on a typical day when you are drinking? 3 or 4   Q3: How often do you have six or more drinks on one occasion? Less than mo

## 2024-10-01 NOTE — NURSING
[facility]  Nurses Note -- 4 Eyes      10/1/2024       Skin assessed on: Q Shift      [x] No Pressure Injuries Present    []Prevention Measures Documented    [] Yes LDA  for Pressure Injury Previously documented     [] Yes New Pressure Injury Discovered   [] LDA for New Pressure Injury Added      Attending RN:  Nusrat Whitmore LPN     Second RN:  Fabiano Barnard RN      .

## 2024-10-01 NOTE — ASSESSMENT & PLAN NOTE
Patient's most recent potassium results are listed below.   Recent Labs     09/30/24  0321 10/01/24  0458   K 3.1* 3.1*     Plan  - Replete potassium per protocol  - Monitor potassium Daily  - Patient's hypokalemia is stable

## 2024-10-01 NOTE — ASSESSMENT & PLAN NOTE
Anemia is likely due to  alcohol abuse . Most recent hemoglobin and hematocrit are listed below.  Recent Labs     09/30/24  0321   HGB 13.7*   HCT 40.8     Plan  - Monitor serial CBC: Daily  - Transfuse PRBC if patient becomes hemodynamically unstable, symptomatic or H/H drops below 7/21.  - Patient has not received any PRBC transfusions to date  - Patient's anemia is currently stable

## 2024-10-01 NOTE — PROGRESS NOTES
[facility] Medicine  Progress Note    Patient Name: Ruthy Ramires  MRN: [id number]  Patient Class: IP- Inpatient   Admission Date: 9/29/2024  Length of Stay: 2 days  Attending Physician: Angelica Nuñez MD  Primary Care Provider: Fanny Arshad NP        Subjective:     Principal Problem:Alcohol withdrawal        HPI:  Mr. Ramires is a 44 yo M with hx of anxiety disorder, depression, alcohol use disorder and hiatal hernia who presents to the [facility] ED for evaluation of nausea, vomiting and weakness. He reports drinking a fifth of vodka daily, last drink yesterday morning per ED staff. Patient has his eyes closed and not participating in my encounter. He has received 6 mg IV ativan prior to this. He did wake up for nurse some and answered questions. All information is taken from chart and other medical staff.  In the ED, he was tachycardic with heart rate in 150s, /92 and oxygen saturation 97% on room air. He was given IVF, IV ativan wth mild improvement. Given concerns for his alcohol withdrawals and heart rate, he will be admitted to ICU for further management.    Overview/Hospital Course:  Mr. Ramires is a 43-year-old male who was admitted with alcohol withdrawal.  Started on IV fluids, p.o. Valium taper and antiemetics. Slowly improving, replacing electrolytes. Initially in ICU for unstable vitals, now improving. Stable for transfer to floor.    Interval History: Overall better today. Able to eat some breakfast. Nauseated afterwards, anxious. No hallucinations, sweats, tremors.     Review of Systems   Constitutional:  Negative for chills and fever.   Respiratory:  Negative for shortness of breath.    Cardiovascular:  Negative for chest pain.   Gastrointestinal:  Positive for nausea. Negative for abdominal pain.   Neurological:  Negative for tremors.   Psychiatric/Behavioral:  Negative for confusion. The patient is nervous/anxious.      Objective:     Vital Signs  (Most Recent):  Temp: 97.8 °F (36.6 °C) (10/01/24 1046)  Pulse: 100 (10/01/24 1122)  Resp: 16 (10/01/24 1046)  BP: (!) 136/90 (10/01/24 1046)  SpO2: 97 % (10/01/24 1046) Vital Signs (24h Range):  Temp:  [97.6 °F (36.4 °C)-98.6 °F (37 °C)] 97.8 °F (36.6 °C)  Pulse:  [] 100  Resp:  [16-23] 16  SpO2:  [96 %-98 %] 97 %  BP: (112-156)/() 136/90     Weight: 109.2 kg (240 lb 11.9 oz)  Body mass index is 30.91 kg/m².    Intake/Output Summary (Last 24 hours) at 10/1/2024 1212  Last data filed at 10/1/2024 0830  Gross per 24 hour   Intake 120 ml   Output 250 ml   Net -130 ml         Physical Exam  Vitals and nursing note reviewed.   Constitutional:       General: He is not in acute distress.     Appearance: He is not toxic-appearing.   HENT:      Head: Normocephalic and atraumatic.   Cardiovascular:      Rate and Rhythm: Regular rhythm. Tachycardia present.   Pulmonary:      Effort: Pulmonary effort is normal.      Breath sounds: Normal breath sounds.   Abdominal:      General: Bowel sounds are normal.      Palpations: Abdomen is soft.      Tenderness: There is no abdominal tenderness.   Musculoskeletal:      Right lower leg: No edema.      Left lower leg: No edema.   Neurological:      Mental Status: He is alert. Mental status is at baseline.             Significant Labs: All pertinent labs within the past 24 hours have been reviewed.    Significant Imaging: I have reviewed all pertinent imaging results/findings within the past 24 hours.    Assessment/Plan:      * Alcohol withdrawal  Presented with alcohol withdrawals, tremors, tachycardia and nausea/vomiting. Last drink 9/28 in the am. Given IVF, IV vitamins and multiple doses of IV ativan in ED. Start on scheduled PO valium. Takes xanax at home as needed.  - CIWA protocol  - advance diet as tolerated  - continue valium 5mg Q8  - PRN ativan for CIWA >8  - thiamine, folate, multivitamin      Normocytic anemia  Anemia is likely due to  alcohol abuse . Most recent  hemoglobin and hematocrit are listed below.  Recent Labs     09/30/24 0321   HGB 13.7*   HCT 40.8     Plan  - Monitor serial CBC: Daily  - Transfuse PRBC if patient becomes hemodynamically unstable, symptomatic or H/H drops below 7/21.  - Patient has not received any PRBC transfusions to date  - Patient's anemia is currently stable    Thrombocytopenia  The likely etiology of thrombocytopenia is  alcohol abuse . The patients 3 most recent labs are listed below.  Recent Labs     09/30/24 0321   PLT 88*     Plan  - Will transfuse if platelet count is <50k (if undergoing surgical procedure or have active bleeding).  - monitor daily       Hypophosphatemia  Patient has Abnormal Phosphorus: hypophosphatemia. Will continue to monitor electrolytes closely. Will replace the affected electrolytes and repeat labs to be done after interventions completed. The patient's phosphorus results have been reviewed and are listed below.  Recent Labs   Lab 10/01/24  0458   PHOS 1.4*        Alcoholic hepatitis without ascites  Elevated enzymes, suspect due to alcohol use  - MDF only 4.2 upon admission   - not started on steroids  - monitor CBC, CMP daily       Moderate episode of recurrent major depressive disorder  Stable, resume home medications      Hypokalemia  Patient's most recent potassium results are listed below.   Recent Labs     09/30/24  0321 10/01/24  0458   K 3.1* 3.1*     Plan  - Replete potassium per protocol  - Monitor potassium Daily  - Patient's hypokalemia is stable    JOVANNI (generalized anxiety disorder)  - reviewed Psychiatry notes and resumed home medications        VTE Risk Mitigation (From admission, onward)           Ordered     enoxaparin injection 40 mg  Daily         09/29/24 1353     IP VTE HIGH RISK PATIENT  Once         09/29/24 1353     Place sequential compression device  Until discontinued         09/29/24 1353                    Discharge Planning   MOIRA:      Code Status: Full Code   Is the patient  medically ready for discharge?:     Reason for patient still in hospital (select all that apply): Patient trending condition  Discharge Plan A: Home                  Angelica Nuñez MD  Department of Hospital Medicine   [facility]

## 2024-10-01 NOTE — ASSESSMENT & PLAN NOTE
The likely etiology of thrombocytopenia is  alcohol abuse . The patients 3 most recent labs are listed below.  Recent Labs     09/30/24  0321   PLT 88*     Plan  - Will transfuse if platelet count is <50k (if undergoing surgical procedure or have active bleeding).  - monitor daily

## 2024-10-01 NOTE — SUBJECTIVE & OBJECTIVE
Interval History: Overall better today. Able to eat some breakfast. Nauseated afterwards, anxious. No hallucinations, sweats, tremors.     Review of Systems   Constitutional:  Negative for chills and fever.   Respiratory:  Negative for shortness of breath.    Cardiovascular:  Negative for chest pain.   Gastrointestinal:  Positive for nausea. Negative for abdominal pain.   Neurological:  Negative for tremors.   Psychiatric/Behavioral:  Negative for confusion. The patient is nervous/anxious.      Objective:     Vital Signs (Most Recent):  Temp: 97.8 °F (36.6 °C) (10/01/24 1046)  Pulse: 100 (10/01/24 1122)  Resp: 16 (10/01/24 1046)  BP: (!) 136/90 (10/01/24 1046)  SpO2: 97 % (10/01/24 1046) Vital Signs (24h Range):  Temp:  [97.6 °F (36.4 °C)-98.6 °F (37 °C)] 97.8 °F (36.6 °C)  Pulse:  [] 100  Resp:  [16-23] 16  SpO2:  [96 %-98 %] 97 %  BP: (112-156)/() 136/90     Weight: 109.2 kg (240 lb 11.9 oz)  Body mass index is 30.91 kg/m².    Intake/Output Summary (Last 24 hours) at 10/1/2024 1212  Last data filed at 10/1/2024 0830  Gross per 24 hour   Intake 120 ml   Output 250 ml   Net -130 ml         Physical Exam  Vitals and nursing note reviewed.   Constitutional:       General: He is not in acute distress.     Appearance: He is not toxic-appearing.   HENT:      Head: Normocephalic and atraumatic.   Cardiovascular:      Rate and Rhythm: Regular rhythm. Tachycardia present.   Pulmonary:      Effort: Pulmonary effort is normal.      Breath sounds: Normal breath sounds.   Abdominal:      General: Bowel sounds are normal.      Palpations: Abdomen is soft.      Tenderness: There is no abdominal tenderness.   Musculoskeletal:      Right lower leg: No edema.      Left lower leg: No edema.   Neurological:      Mental Status: He is alert. Mental status is at baseline.             Significant Labs: All pertinent labs within the past 24 hours have been reviewed.    Significant Imaging: I have reviewed all pertinent imaging  results/findings within the past 24 hours.

## 2024-10-01 NOTE — PLAN OF CARE
Patient Alert and oriented x4. Patient mildly anxious. Respirations even and unlabored. No distress noted. Skin warm an dry. Iv saline locked. No complications noted. Patient complains of nausea. Prn medication given as needed. Patient denies pain at this time. Pain medication available as needed. Ciwa every 8 hours. Valium given as ordered. Patient ambulated to bathroom throughout shift. Patient refuses scds. Patient has no complaints at this time. Bed in lowest position. Call bell within reach. Instructed to call for any needs.     Problem: Adult Inpatient Plan of Care  Goal: Plan of Care Review  Outcome: Progressing  Flowsheets (Taken 10/1/2024 0352)  Plan of Care Reviewed With: patient  Goal: Patient-Specific Goal (Individualized)  Outcome: Progressing  Goal: Absence of Hospital-Acquired Illness or Injury  Outcome: Progressing  Intervention: Identify and Manage Fall Risk  Flowsheets (Taken 10/1/2024 0352)  Safety Promotion/Fall Prevention:   assistive device/personal item within reach   bed alarm refused   Fall Risk signage in place   Fall Risk reviewed with patient/family   medications reviewed   high risk medications identified  Intervention: Prevent Skin Injury  Flowsheets (Taken 10/1/2024 0352)  Body Position: position changed independently  Goal: Optimal Comfort and Wellbeing  Outcome: Progressing  Intervention: Monitor Pain and Promote Comfort  Flowsheets (Taken 10/1/2024 0352)  Pain Management Interventions:   care clustered   medication offered but refused

## 2024-10-02 ENCOUNTER — TELEPHONE (OUTPATIENT)
Dept: HEPATOLOGY | Facility: CLINIC | Age: 44
End: 2024-10-02
Payer: COMMERCIAL

## 2024-10-02 ENCOUNTER — TELEPHONE (OUTPATIENT)
Dept: TRANSPLANT | Facility: CLINIC | Age: 44
End: 2024-10-02
Payer: COMMERCIAL

## 2024-10-02 VITALS
OXYGEN SATURATION: 96 % | WEIGHT: 240.75 LBS | SYSTOLIC BLOOD PRESSURE: 138 MMHG | HEIGHT: 74 IN | HEART RATE: 111 BPM | TEMPERATURE: 98 F | DIASTOLIC BLOOD PRESSURE: 86 MMHG | RESPIRATION RATE: 18 BRPM | BODY MASS INDEX: 30.9 KG/M2

## 2024-10-02 LAB
ALBUMIN SERPL BCP-MCNC: 3.4 G/DL (ref 3.5–5.2)
ALP SERPL-CCNC: 102 U/L (ref 55–135)
ALT SERPL W/O P-5'-P-CCNC: 130 U/L (ref 10–44)
ANION GAP SERPL CALC-SCNC: 11 MMOL/L (ref 8–16)
AST SERPL-CCNC: 140 U/L (ref 10–40)
BASOPHILS # BLD AUTO: 0.02 K/UL (ref 0–0.2)
BASOPHILS NFR BLD: 0.5 % (ref 0–1.9)
BILIRUB SERPL-MCNC: 2.4 MG/DL (ref 0.1–1)
BUN SERPL-MCNC: 8 MG/DL (ref 6–20)
CALCIUM SERPL-MCNC: 8.5 MG/DL (ref 8.7–10.5)
CHLORIDE SERPL-SCNC: 99 MMOL/L (ref 95–110)
CO2 SERPL-SCNC: 26 MMOL/L (ref 23–29)
CREAT SERPL-MCNC: 0.7 MG/DL (ref 0.5–1.4)
DIFFERENTIAL METHOD BLD: ABNORMAL
EOSINOPHIL # BLD AUTO: 0.1 K/UL (ref 0–0.5)
EOSINOPHIL NFR BLD: 2.2 % (ref 0–8)
ERYTHROCYTE [DISTWIDTH] IN BLOOD BY AUTOMATED COUNT: 12.7 % (ref 11.5–14.5)
EST. GFR  (NO RACE VARIABLE): >60 ML/MIN/1.73 M^2
GLUCOSE SERPL-MCNC: 91 MG/DL (ref 70–110)
HCT VFR BLD AUTO: 42.1 % (ref 40–54)
HGB BLD-MCNC: 13.9 G/DL (ref 14–18)
IMM GRANULOCYTES # BLD AUTO: 0.02 K/UL (ref 0–0.04)
IMM GRANULOCYTES NFR BLD AUTO: 0.5 % (ref 0–0.5)
LYMPHOCYTES # BLD AUTO: 1 K/UL (ref 1–4.8)
LYMPHOCYTES NFR BLD: 28.3 % (ref 18–48)
MAGNESIUM SERPL-MCNC: 1.9 MG/DL (ref 1.6–2.6)
MCH RBC QN AUTO: 28.4 PG (ref 27–31)
MCHC RBC AUTO-ENTMCNC: 33 G/DL (ref 32–36)
MCV RBC AUTO: 86 FL (ref 82–98)
MONOCYTES # BLD AUTO: 0.2 K/UL (ref 0.3–1)
MONOCYTES NFR BLD: 5.4 % (ref 4–15)
NEUTROPHILS # BLD AUTO: 2.3 K/UL (ref 1.8–7.7)
NEUTROPHILS NFR BLD: 63.1 % (ref 38–73)
NRBC BLD-RTO: 0 /100 WBC
PHOSPHATE SERPL-MCNC: 3 MG/DL (ref 2.7–4.5)
PLATELET # BLD AUTO: 91 K/UL (ref 150–450)
PMV BLD AUTO: 10.4 FL (ref 9.2–12.9)
POTASSIUM SERPL-SCNC: 3.3 MMOL/L (ref 3.5–5.1)
PROT SERPL-MCNC: 6.6 G/DL (ref 6–8.4)
RBC # BLD AUTO: 4.9 M/UL (ref 4.6–6.2)
SODIUM SERPL-SCNC: 136 MMOL/L (ref 136–145)
WBC # BLD AUTO: 3.68 K/UL (ref 3.9–12.7)

## 2024-10-02 PROCEDURE — 85025 COMPLETE CBC W/AUTO DIFF WBC: CPT | Performed by: HOSPITALIST

## 2024-10-02 PROCEDURE — 83735 ASSAY OF MAGNESIUM: CPT | Performed by: STUDENT IN AN ORGANIZED HEALTH CARE EDUCATION/TRAINING PROGRAM

## 2024-10-02 PROCEDURE — 25000003 PHARM REV CODE 250: Performed by: STUDENT IN AN ORGANIZED HEALTH CARE EDUCATION/TRAINING PROGRAM

## 2024-10-02 PROCEDURE — 36415 COLL VENOUS BLD VENIPUNCTURE: CPT | Performed by: STUDENT IN AN ORGANIZED HEALTH CARE EDUCATION/TRAINING PROGRAM

## 2024-10-02 PROCEDURE — 80053 COMPREHEN METABOLIC PANEL: CPT | Performed by: STUDENT IN AN ORGANIZED HEALTH CARE EDUCATION/TRAINING PROGRAM

## 2024-10-02 PROCEDURE — 94761 N-INVAS EAR/PLS OXIMETRY MLT: CPT

## 2024-10-02 PROCEDURE — 84100 ASSAY OF PHOSPHORUS: CPT | Performed by: STUDENT IN AN ORGANIZED HEALTH CARE EDUCATION/TRAINING PROGRAM

## 2024-10-02 PROCEDURE — 25000003 PHARM REV CODE 250: Performed by: HOSPITALIST

## 2024-10-02 PROCEDURE — 25000003 PHARM REV CODE 250: Performed by: INTERNAL MEDICINE

## 2024-10-02 RX ORDER — POTASSIUM CHLORIDE 20 MEQ/1
40 TABLET, EXTENDED RELEASE ORAL ONCE
Status: COMPLETED | OUTPATIENT
Start: 2024-10-02 | End: 2024-10-02

## 2024-10-02 RX ORDER — DIAZEPAM 5 MG/1
TABLET ORAL
Qty: 9 TABLET | Refills: 0 | Status: SHIPPED | OUTPATIENT
Start: 2024-10-02 | End: 2024-10-08

## 2024-10-02 RX ORDER — ACETAMINOPHEN 325 MG/1
650 TABLET ORAL EVERY 6 HOURS PRN
Status: DISCONTINUED | OUTPATIENT
Start: 2024-10-02 | End: 2024-10-02 | Stop reason: HOSPADM

## 2024-10-02 RX ORDER — DIAZEPAM 5 MG/1
5 TABLET ORAL 2 TIMES DAILY
Status: DISCONTINUED | OUTPATIENT
Start: 2024-10-02 | End: 2024-10-02 | Stop reason: HOSPADM

## 2024-10-02 RX ORDER — ONDANSETRON 4 MG/1
4 TABLET, ORALLY DISINTEGRATING ORAL EVERY 6 HOURS PRN
Qty: 20 TABLET | Refills: 0 | Status: SHIPPED | OUTPATIENT
Start: 2024-10-02

## 2024-10-02 RX ORDER — TRAMADOL HYDROCHLORIDE 50 MG/1
50 TABLET ORAL EVERY 6 HOURS PRN
Status: DISCONTINUED | OUTPATIENT
Start: 2024-10-02 | End: 2024-10-02 | Stop reason: HOSPADM

## 2024-10-02 RX ADMIN — POTASSIUM CHLORIDE 40 MEQ: 1500 TABLET, EXTENDED RELEASE ORAL at 09:10

## 2024-10-02 RX ADMIN — PANTOPRAZOLE SODIUM 40 MG: 40 TABLET, DELAYED RELEASE ORAL at 09:10

## 2024-10-02 RX ADMIN — THIAMINE HCL TAB 100 MG 100 MG: 100 TAB at 09:10

## 2024-10-02 RX ADMIN — FOLIC ACID 1 MG: 1 TABLET ORAL at 09:10

## 2024-10-02 RX ADMIN — MUPIROCIN: 20 OINTMENT TOPICAL at 09:10

## 2024-10-02 RX ADMIN — DIAZEPAM 5 MG: 5 TABLET ORAL at 05:10

## 2024-10-02 RX ADMIN — BUSPIRONE HYDROCHLORIDE 30 MG: 10 TABLET ORAL at 09:10

## 2024-10-02 RX ADMIN — SERTRALINE HYDROCHLORIDE 100 MG: 50 TABLET ORAL at 09:10

## 2024-10-02 RX ADMIN — ACETAMINOPHEN 650 MG: 325 TABLET ORAL at 12:10

## 2024-10-02 RX ADMIN — THERA TABS 1 TABLET: TAB at 09:10

## 2024-10-02 NOTE — DISCHARGE SUMMARY
[facility] Medicine  Discharge Summary      Patient Name: Ruthy Ramires  MRN: [id number]  [facility]: [id number]  Patient Class: IP- Inpatient  Admission Date: 9/29/2024  Hospital Length of Stay: 3 days  Discharge Date and Time:  10/02/2024 8:59 AM  Attending Physician: Angelica Nuñez MD   Discharging Provider: Angelica uNñez MD  Primary Care Provider: Fanny Arshad NP    Primary Care Team: Networked reference to record PCT     HPI:   Mr. Ramires is a 44 yo M with hx of anxiety disorder, depression, alcohol use disorder and hiatal hernia who presents to the [facility] ED for evaluation of nausea, vomiting and weakness. He reports drinking a fifth of vodka daily, last drink yesterday morning per ED staff. Patient has his eyes closed and not participating in my encounter. He has received 6 mg IV ativan prior to this. He did wake up for nurse some and answered questions. All information is taken from chart and other medical staff.  In the ED, he was tachycardic with heart rate in 150s, /92 and oxygen saturation 97% on room air. He was given IVF, IV ativan wth mild improvement. Given concerns for his alcohol withdrawals and heart rate, he will be admitted to ICU for further management.    * No surgery found *      Hospital Course:   Mr. Ramires is a 43-year-old male who was admitted with alcohol withdrawal.  Started on IV fluids, p.o. Valium taper and antiemetics. Slowly improving, replacing electrolytes. Initially in ICU for unstable vitals, now improving. Stable for transfer to floor. Remains stable. Tolerating diet. Weaned valium. Stable for discharge to home with valium taper. Instructed to hold home xanax while completing valium taper. Referral placed to Hepatology for follow up of abnormal LFTs most likely due to alcohol.      Goals of Care Treatment Preferences:  Code Status: Full Code      SDOH Screening:  The patient was screened for utility difficulties, food  insecurity, transport difficulties, housing insecurity, and interpersonal safety and there were no concerns identified this admission.     Consults:     No new Assessment & Plan notes have been filed under this hospital service since the last note was generated.  Service: Hospital Medicine    Final Active Diagnoses:    Diagnosis Date Noted POA    PRINCIPAL PROBLEM:  Alcohol withdrawal [F10.939] 04/20/2020 Yes    Hypophosphatemia [E83.39] 10/01/2024 Yes    Thrombocytopenia [D69.6] 10/01/2024 Yes    Normocytic anemia [D64.9] 10/01/2024 Yes    Alcoholic hepatitis without ascites [K70.10] 09/29/2024 Yes    Moderate episode of recurrent major depressive disorder [F33.1] 07/19/2023 Yes    JOVANNI (generalized anxiety disorder) [F41.1] 04/20/2020 Yes    Hypokalemia [E87.6] 04/20/2020 Yes      Problems Resolved During this Admission:    Diagnosis Date Noted Date Resolved POA    Tachycardia [R00.0] 06/13/2024 10/01/2024 Yes       Discharged Condition: good    Disposition: Home or Self Care    Follow Up: with PCP, Hepatology     Patient Instructions:      Ambulatory referral/consult to Hepatology   Standing Status: Future   Referral Priority: Routine Referral Type: Consultation   Referral Reason: Specialty Services Required   Requested Specialty: Hepatology   Number of Visits Requested: 1     Diet Adult Regular     Notify your health care provider if you experience any of the following:  temperature >100.4     Notify your health care provider if you experience any of the following:  persistent nausea and vomiting or diarrhea     Notify your health care provider if you experience any of the following:  severe uncontrolled pain     Notify your health care provider if you experience any of the following:  redness, tenderness, or signs of infection (pain, swelling, redness, odor or green/yellow discharge around incision site)     Notify your health care provider if you experience any of the following:  difficulty breathing or  increased cough     Notify your health care provider if you experience any of the following:  severe persistent headache     Notify your health care provider if you experience any of the following:  worsening rash     Notify your health care provider if you experience any of the following:  persistent dizziness, light-headedness, or visual disturbances     Notify your health care provider if you experience any of the following:  increased confusion or weakness     Activity as tolerated       Significant Diagnostic Studies: N/A    Pending Diagnostic Studies:       None           Medications:  Reconciled Home Medications:      Medication List        START taking these medications      diazePAM 5 MG tablet  Commonly known as: VALIUM  Take 1 tablet (5 mg total) by mouth 2 (two) times a day for 3 days, THEN 1 tablet (5 mg total) once daily for 3 days.  Start taking on: October 2, 2024            CONTINUE taking these medications      ALPRAZolam 0.25 MG tablet  Commonly known as: XANAX  Take 1 tablet (0.25 mg total) by mouth daily as needed for Anxiety.     ARIPiprazole 5 MG Tab  Commonly known as: ABILIFY  Take 1 tablet (5 mg total) by mouth every evening.     busPIRone 30 MG Tab  Commonly known as: BUSPAR  Take 1 tablet (30 mg total) by mouth 2 (two) times daily.     hydrOXYzine pamoate 50 MG Cap  Commonly known as: VISTARIL  Take 1 capsule (50 mg total) by mouth nightly as needed (insomnia).     ondansetron 4 MG Tbdl  Commonly known as: ZOFRAN-ODT  Take 1 tablet (4 mg total) by mouth every 6 (six) hours as needed (nausea).     pantoprazole 40 MG tablet  Commonly known as: PROTONIX  Take 1 tablet (40 mg total) by mouth once daily.     propranoloL 10 MG tablet  Commonly known as: INDERAL  Take 1 tablet (10 mg total) by mouth 3 (three) times daily as needed (anxiety).     sertraline 100 MG tablet  Commonly known as: ZOLOFT  Take 1 tablet (100 mg total) by mouth once daily.              Indwelling Lines/Drains at time of  discharge:   Lines/Drains/Airways       None                   Time spent on the discharge of patient: 35 minutes         Angelica Nuñez MD  Department of Hospital Medicine  [facility]

## 2024-10-02 NOTE — TELEPHONE ENCOUNTER
Appt scheduled for HFU 11/26/2024 with Dr. Johnson.  Patient confirmed and agreed with the appt.  Reminder letter mailed.

## 2024-10-02 NOTE — TELEPHONE ENCOUNTER
Appt scheduled 11/26/2024 with dr. Johnson.  Patient confirmed and agreed with the appt.  Reminder letter mailed.

## 2024-10-02 NOTE — TELEPHONE ENCOUNTER
----- Message from George Arevalo sent at 10/2/2024  2:25 PM CDT -----  Regarding: FW: Np/ referral Hosp    ----- Message -----  From: Evelia Trejo  Sent: 10/2/2024  11:15 AM CDT  To: [facility] Staff  Subject: Np/ referral Hosp                                Type:  Sooner Apoointment Request    Caller is requesting a sooner appointment.  Caller declined first available appointment listed below.  Caller will not accept being placed on the waitlist and is requesting a message be sent to doctor.    Name of Caller:Nurse    When is the first available appointment?n/a    Symptoms:hosp f/u    Would the patient rather a call back or a response via [facility]? Call back    Best Call Back Number:[phone]    Additional Information: Pt need hosp f/u appt. Thank you

## 2024-10-02 NOTE — TELEPHONE ENCOUNTER
Spoke to the pt. Informed referral rec message to scheduling staff for appt.      Pt records reviewed. Hospital discharge today.   Pt will be referred to Hepatology due to alcohol hepatitis   Initial referral received  from Angelica Nuñez MD         RECORDS SCANNED IN MEDIA UNDER HEPATOLOGY REFERRAL .

## 2024-10-02 NOTE — NURSING
[facility] [place]  Nurses Note -- 4 Eyes      10/2/2024       Skin assessed on: Q Shift      [x] No Pressure Injuries Present    []Prevention Measures Documented    [] Yes LDA  for Pressure Injury Previously documented     [] Yes New Pressure Injury Discovered   [] LDA for New Pressure Injury Added      Attending RN:  Denise Oconnell, RN     Second RN:  Heidy Cuenca

## 2024-10-02 NOTE — TELEPHONE ENCOUNTER
----- Message from Sara Myers sent at 10/2/2024  2:33 PM CDT -----  Regarding: hepatology referral  Hospital discharge today alcohol hepatitis needs first avail.   Referral in workque.

## 2024-10-02 NOTE — PLAN OF CARE
Case Management Final Discharge Note    Discharge Disposition: Home     New DME ordered / company name: None    Relevant SDOH / Transition of Care Barriers:  None    Primary Caretaker and contact information: Tai Tellez [phone]    Scheduled followup appointment: PCP scheduled     Referrals placed: Hepatology - Office will contact pt to schedule a hospital follow-up. Message sent to office staff.     Transportation: Pt's family will provide transportation home.    Patient and family educated on discharge services and updated on DC plan. Bedside RN notified, patient clear to discharge from Case Management Perspective.       10/02/24 1116   Final Note   Assessment Type Final Discharge Note   Anticipated Discharge Disposition Home   What phone number can be called within the next 1-3 days to see how you are doing after discharge? [phone]   Hospital Resources/Appts/Education Provided Appointments scheduled and added to AVS   Post-Acute Status   Coverage [employer]   Discharge Delays None known at this time

## 2024-10-02 NOTE — PLAN OF CARE
Problem: Adult Inpatient Plan of Care  Goal: Plan of Care Review  Outcome: Progressing  Goal: Patient-Specific Goal (Individualized)  Outcome: Progressing  Goal: Absence of Hospital-Acquired Illness or Injury  Outcome: Progressing  Goal: Optimal Comfort and Wellbeing  Outcome: Progressing     POC reviewed with patient. All questions and concerns addressed. Fall/safety precautions implemented and maintained. CIWA q8h. No acute events noted this shift. Please see flowsheet for full assessment and vitals. Bed locked in lowest position. Side rails up x2. Call bell within reach.

## 2024-10-02 NOTE — NURSING
Pt discharged per MD order. IV removed. Catheter tip intact Vitals per chart. afebrile. No complaints of pain, N/V, diarrhea, or SOB. RX delivered by [facility] pharmacy. Transport requested.

## 2024-10-03 LAB
BACTERIA BLD CULT: NORMAL
BACTERIA BLD CULT: NORMAL

## 2024-10-04 ENCOUNTER — OFFICE VISIT (OUTPATIENT)
Dept: PSYCHIATRY | Facility: CLINIC | Age: 44
End: 2024-10-04
Payer: COMMERCIAL

## 2024-10-04 DIAGNOSIS — F33.1 MODERATE EPISODE OF RECURRENT MAJOR DEPRESSIVE DISORDER: ICD-10-CM

## 2024-10-04 DIAGNOSIS — F10.11 ALCOHOL USE DISORDER, MILD, IN EARLY REMISSION, ABUSE: ICD-10-CM

## 2024-10-04 DIAGNOSIS — F41.1 GAD (GENERALIZED ANXIETY DISORDER): Primary | ICD-10-CM

## 2024-10-04 DIAGNOSIS — F51.04 INSOMNIA, PSYCHOPHYSIOLOGICAL: ICD-10-CM

## 2024-10-04 NOTE — PROGRESS NOTES
Individual Psychotherapy (PhD/LCSW)    10/04/2024    Interim Events/Subjective Report/Content of Current Session:  follow-up appointment.    Pt is a 43 y.o. male with past psychiatric hx of  depression anxiety alcohol abuse who presents for follow-up treatment.    Relapse reported recently .  Was in hospital in ICU due to cardiac issues which were as a result from alcohol intake this past week.  Patient was discharged Wednesday.  Attended with both parents.  Interactive complexity code is utilized today in session coding due to specific nature of session being a planning session for care with family present.  First relapse was in April.  Patient then began attending meetings again but did not attend therapy nor sought out medication management.  Pt request for today's session was to evaluate current state as well as create plan with parents present as to what would benefit patient with treatment.   provided various levels of education for patient and parents in regards to resources available for addiction.  Patient contemplating IOP as he may be able to participate in nighttime IOP offered within various facilities.   explained appropriateness of level of cares post discharge from hospital.  Patient stated he has not experienced any suicidal ideation.  Patient also requesting to discuss nature of addictive behaviors being caused by increased anxiety and need for perfectionism.     provided insight as well as active listening.  After giving full explanation of various options of therapy and resources, patient agreed to attend biweekly psychotherapy sessions, continue medication management with and MELVIN Musa, attend AA meetings which he is already participating in, and possibly look into IOP.  Patient was provided safety plan in order for caregivers to be aware of warning signs and triggers regarding his presentation during active addiction.  And ways to approach when patient  is needing help.  Patient also provided recovery plan worksheet to complete.  Patient was given homework to take time in next AA meeting to front load his lack of comfort ability with sharing in sessions after he expressed that he is attending meetings but not feeling that he is getting anything out of them because he has a fear of sharing what he is going through.    Current symptoms:  Depression: dysphoric mood.  Anxiety: excessive worrying and obsessions/compulsions.  Sleep: non-restful sleep.  Melony:  denies.  Psychosis: denies .  Therapeutic Intervention/Techniques: behavior modification, insight oriented, interactive, and supportive; relevant to diagnosis, patient responds to this modality    Risk Parameters:  Patient reports no suicidal ideation  Patient reports no homicidal ideation  Patient reports no self-injurious behavior  Patient reports no violent behavior    Diagnosis:   1. JOVANNI (generalized anxiety disorder)        2. Moderate episode of recurrent major depressive disorder        3. Insomnia, psychophysiological        4. Alcohol use disorder, mild, in early remission, abuse            Return to Clinic: as scheduled  Counseling time: 45  -Call to report any worsening of symptoms or problems associated with medication.  - Pt instructed to go to ER if thoughts of harming self or others arise.   -Supportive therapy and psychoeducation provided  -Pt instructed to call clinic, 911 or go to nearest emergency room if sxs worsen or pt is in crisis. The pt expresses understanding.   Each patient to whom he or she provides medical services by telemedicine is:  (1) informed of the relationship between the physician and patient and the respective role of any other health care provider with respect to management of the patient; and (2) notified that he or she may decline to receive medical services by telemedicine and may withdraw from such care at any time.

## 2024-10-09 DIAGNOSIS — F41.1 GAD (GENERALIZED ANXIETY DISORDER): ICD-10-CM

## 2024-10-09 RX ORDER — ALPRAZOLAM 0.25 MG/1
0.25 TABLET ORAL DAILY PRN
Qty: 30 TABLET | Refills: 0 | Status: SHIPPED | OUTPATIENT
Start: 2024-10-09 | End: 2024-11-08

## 2024-10-15 ENCOUNTER — OFFICE VISIT (OUTPATIENT)
Dept: PSYCHIATRY | Facility: CLINIC | Age: 44
End: 2024-10-15
Payer: COMMERCIAL

## 2024-10-15 DIAGNOSIS — F10.11 ALCOHOL USE DISORDER, MILD, IN EARLY REMISSION, ABUSE: ICD-10-CM

## 2024-10-15 DIAGNOSIS — F41.1 GAD (GENERALIZED ANXIETY DISORDER): Primary | ICD-10-CM

## 2024-10-15 DIAGNOSIS — F51.04 INSOMNIA, PSYCHOPHYSIOLOGICAL: ICD-10-CM

## 2024-10-15 DIAGNOSIS — F33.1 MODERATE EPISODE OF RECURRENT MAJOR DEPRESSIVE DISORDER: ICD-10-CM

## 2024-10-15 PROCEDURE — 3044F HG A1C LEVEL LT 7.0%: CPT | Mod: CPTII,S$GLB,, | Performed by: SOCIAL WORKER

## 2024-10-15 PROCEDURE — 90837 PSYTX W PT 60 MINUTES: CPT | Mod: S$GLB,,, | Performed by: SOCIAL WORKER

## 2024-10-15 NOTE — PROGRESS NOTES
Individual Psychotherapy (PhD/LCSW)    10/15/2024    Interim Events/Subjective Report/Content of Current Session:  follow-up appointment.    Pt is a 43 y.o. male with past psychiatric hx of  depression anxiety alcohol abuse who presents for follow-up treatment.    Pt reported current status towards recovery and managing emotions. Went to a meeting recently and did not enjoy the experience in person. Went online and was able to share his feelings which was something that was hw last session. Discussed progress. Pt has been attempting to discuss with family and support system ways to support pt recovery and increase ability to communication   Pt and sw reviewed past hx of shame and guilt being a part of his past and  past romantic relationships specifically. Pt stated he identified that he is open more to communicating to others in his support system and learning that he has a large group of people who care for him that are also going through difficult times.     Current symptoms:  Depression: dysphoric mood.  Anxiety: excessive worrying and obsessions/compulsions.  Sleep: non-restful sleep.  Melony:  denies.  Psychosis: denies .    Will continue to follow. Plan to attempt to find AA meeting in person and increase communication to support system. Next session to discuss ways to increase ability to identify coping skills.   Pt aware to contact sw for any additional needs that may occur prior to next session.    Therapeutic Intervention/Techniques: behavior modification, insight oriented, interactive, and supportive; relevant to diagnosis, patient responds to this modality    Risk Parameters:  Patient reports no suicidal ideation  Patient reports no homicidal ideation  Patient reports no self-injurious behavior  Patient reports no violent behavior    Diagnosis:   1. JOVANNI (generalized anxiety disorder)        2. Moderate episode of recurrent major depressive disorder        3. Alcohol use disorder, mild, in early  remission, abuse        4. Insomnia, psychophysiological              Return to Clinic: as scheduled  Counseling time: 45  -Call to report any worsening of symptoms or problems associated with medication.  - Pt instructed to go to ER if thoughts of harming self or others arise.   -Supportive therapy and psychoeducation provided  -Pt instructed to call clinic, 911 or go to nearest emergency room if sxs worsen or pt is in crisis. The pt expresses understanding.   Each patient to whom he or she provides medical services by telemedicine is:  (1) informed of the relationship between the physician and patient and the respective role of any other health care provider with respect to management of the patient; and (2) notified that he or she may decline to receive medical services by telemedicine and may withdraw from such care at any time.

## 2024-10-24 ENCOUNTER — TELEPHONE (OUTPATIENT)
Dept: PSYCHIATRY | Facility: CLINIC | Age: 44
End: 2024-10-24
Payer: COMMERCIAL

## 2024-10-25 ENCOUNTER — PATIENT MESSAGE (OUTPATIENT)
Dept: PSYCHIATRY | Facility: CLINIC | Age: 44
End: 2024-10-25
Payer: COMMERCIAL

## 2024-10-28 ENCOUNTER — PATIENT MESSAGE (OUTPATIENT)
Dept: PSYCHIATRY | Facility: CLINIC | Age: 44
End: 2024-10-28
Payer: COMMERCIAL

## 2024-10-30 ENCOUNTER — TELEPHONE (OUTPATIENT)
Dept: PSYCHIATRY | Facility: CLINIC | Age: 44
End: 2024-10-30
Payer: COMMERCIAL

## 2024-10-30 ENCOUNTER — OFFICE VISIT (OUTPATIENT)
Dept: PSYCHIATRY | Facility: CLINIC | Age: 44
End: 2024-10-30
Payer: COMMERCIAL

## 2024-10-30 DIAGNOSIS — F41.1 GAD (GENERALIZED ANXIETY DISORDER): Primary | ICD-10-CM

## 2024-10-30 DIAGNOSIS — F10.10 ALCOHOL ABUSE: ICD-10-CM

## 2024-10-30 DIAGNOSIS — F33.1 MODERATE EPISODE OF RECURRENT MAJOR DEPRESSIVE DISORDER: ICD-10-CM

## 2024-10-30 DIAGNOSIS — F51.04 INSOMNIA, PSYCHOPHYSIOLOGICAL: ICD-10-CM

## 2024-10-30 PROCEDURE — 1111F DSCHRG MED/CURRENT MED MERGE: CPT | Mod: CPTII,95,,

## 2024-10-30 PROCEDURE — 99214 OFFICE O/P EST MOD 30 MIN: CPT | Mod: 95,,,

## 2024-10-30 PROCEDURE — 1160F RVW MEDS BY RX/DR IN RCRD: CPT | Mod: CPTII,95,,

## 2024-10-30 PROCEDURE — 1159F MED LIST DOCD IN RCRD: CPT | Mod: CPTII,95,,

## 2024-10-30 PROCEDURE — 3044F HG A1C LEVEL LT 7.0%: CPT | Mod: CPTII,95,,

## 2024-10-30 RX ORDER — PROPRANOLOL HYDROCHLORIDE 20 MG/1
20 TABLET ORAL 3 TIMES DAILY PRN
Qty: 90 TABLET | Refills: 1 | Status: SHIPPED | OUTPATIENT
Start: 2024-10-30 | End: 2024-12-29

## 2024-10-30 RX ORDER — ALPRAZOLAM 0.25 MG/1
0.38 TABLET ORAL DAILY PRN
Qty: 45 TABLET | Refills: 0 | Status: SHIPPED | OUTPATIENT
Start: 2024-10-30 | End: 2024-11-29

## 2024-10-31 ENCOUNTER — OFFICE VISIT (OUTPATIENT)
Dept: FAMILY MEDICINE | Facility: CLINIC | Age: 44
End: 2024-10-31
Payer: COMMERCIAL

## 2024-10-31 ENCOUNTER — LAB VISIT (OUTPATIENT)
Dept: LAB | Facility: HOSPITAL | Age: 44
End: 2024-10-31
Attending: STUDENT IN AN ORGANIZED HEALTH CARE EDUCATION/TRAINING PROGRAM
Payer: COMMERCIAL

## 2024-10-31 ENCOUNTER — OFFICE VISIT (OUTPATIENT)
Dept: CARDIOLOGY | Facility: CLINIC | Age: 44
End: 2024-10-31
Payer: COMMERCIAL

## 2024-10-31 VITALS
HEART RATE: 121 BPM | SYSTOLIC BLOOD PRESSURE: 130 MMHG | SYSTOLIC BLOOD PRESSURE: 120 MMHG | DIASTOLIC BLOOD PRESSURE: 90 MMHG | WEIGHT: 238.75 LBS | HEIGHT: 74 IN | WEIGHT: 238.31 LBS | BODY MASS INDEX: 30.64 KG/M2 | OXYGEN SATURATION: 95 % | HEIGHT: 74 IN | BODY MASS INDEX: 30.59 KG/M2 | HEART RATE: 100 BPM | DIASTOLIC BLOOD PRESSURE: 94 MMHG

## 2024-10-31 DIAGNOSIS — R00.0 TACHYCARDIA: ICD-10-CM

## 2024-10-31 DIAGNOSIS — F32.A ANXIETY AND DEPRESSION: ICD-10-CM

## 2024-10-31 DIAGNOSIS — F10.10 ALCOHOL ABUSE: ICD-10-CM

## 2024-10-31 DIAGNOSIS — R94.31 ABNORMAL EKG: ICD-10-CM

## 2024-10-31 DIAGNOSIS — K13.70 LESION OF UVULA: ICD-10-CM

## 2024-10-31 DIAGNOSIS — R74.01 TRANSAMINITIS: ICD-10-CM

## 2024-10-31 DIAGNOSIS — Z76.89 ENCOUNTER TO ESTABLISH CARE WITH NEW DOCTOR: Primary | ICD-10-CM

## 2024-10-31 DIAGNOSIS — R68.89 ABNORMAL FINDING OF NOSE: ICD-10-CM

## 2024-10-31 DIAGNOSIS — F41.9 ANXIETY AND DEPRESSION: ICD-10-CM

## 2024-10-31 DIAGNOSIS — K70.10 ALCOHOLIC HEPATITIS WITHOUT ASCITES: Primary | ICD-10-CM

## 2024-10-31 LAB
ALBUMIN SERPL BCP-MCNC: 3.9 G/DL (ref 3.5–5.2)
ALP SERPL-CCNC: 102 U/L (ref 40–150)
ALT SERPL W/O P-5'-P-CCNC: 90 U/L (ref 10–44)
ANION GAP SERPL CALC-SCNC: 9 MMOL/L (ref 8–16)
AST SERPL-CCNC: 90 U/L (ref 10–40)
BILIRUB SERPL-MCNC: 1.6 MG/DL (ref 0.1–1)
BUN SERPL-MCNC: 10 MG/DL (ref 6–20)
CALCIUM SERPL-MCNC: 9.8 MG/DL (ref 8.7–10.5)
CHLORIDE SERPL-SCNC: 106 MMOL/L (ref 95–110)
CO2 SERPL-SCNC: 28 MMOL/L (ref 23–29)
CREAT SERPL-MCNC: 1 MG/DL (ref 0.5–1.4)
EST. GFR  (NO RACE VARIABLE): >60 ML/MIN/1.73 M^2
GLUCOSE SERPL-MCNC: 101 MG/DL (ref 70–110)
POTASSIUM SERPL-SCNC: 4.2 MMOL/L (ref 3.5–5.1)
PROT SERPL-MCNC: 7.5 G/DL (ref 6–8.4)
SODIUM SERPL-SCNC: 143 MMOL/L (ref 136–145)

## 2024-10-31 PROCEDURE — 99204 OFFICE O/P NEW MOD 45 MIN: CPT | Mod: S$GLB,,, | Performed by: STUDENT IN AN ORGANIZED HEALTH CARE EDUCATION/TRAINING PROGRAM

## 2024-10-31 PROCEDURE — 3074F SYST BP LT 130 MM HG: CPT | Mod: CPTII,S$GLB,, | Performed by: INTERNAL MEDICINE

## 2024-10-31 PROCEDURE — 1159F MED LIST DOCD IN RCRD: CPT | Mod: CPTII,S$GLB,, | Performed by: STUDENT IN AN ORGANIZED HEALTH CARE EDUCATION/TRAINING PROGRAM

## 2024-10-31 PROCEDURE — 1111F DSCHRG MED/CURRENT MED MERGE: CPT | Mod: CPTII,S$GLB,, | Performed by: STUDENT IN AN ORGANIZED HEALTH CARE EDUCATION/TRAINING PROGRAM

## 2024-10-31 PROCEDURE — 3044F HG A1C LEVEL LT 7.0%: CPT | Mod: CPTII,S$GLB,, | Performed by: INTERNAL MEDICINE

## 2024-10-31 PROCEDURE — 3008F BODY MASS INDEX DOCD: CPT | Mod: CPTII,S$GLB,, | Performed by: INTERNAL MEDICINE

## 2024-10-31 PROCEDURE — 3080F DIAST BP >= 90 MM HG: CPT | Mod: CPTII,S$GLB,, | Performed by: STUDENT IN AN ORGANIZED HEALTH CARE EDUCATION/TRAINING PROGRAM

## 2024-10-31 PROCEDURE — 1160F RVW MEDS BY RX/DR IN RCRD: CPT | Mod: CPTII,S$GLB,, | Performed by: STUDENT IN AN ORGANIZED HEALTH CARE EDUCATION/TRAINING PROGRAM

## 2024-10-31 PROCEDURE — 1159F MED LIST DOCD IN RCRD: CPT | Mod: CPTII,S$GLB,, | Performed by: INTERNAL MEDICINE

## 2024-10-31 PROCEDURE — 99999 PR PBB SHADOW E&M-EST. PATIENT-LVL III: CPT | Mod: PBBFAC,,, | Performed by: INTERNAL MEDICINE

## 2024-10-31 PROCEDURE — G2211 COMPLEX E/M VISIT ADD ON: HCPCS | Mod: S$GLB,,, | Performed by: STUDENT IN AN ORGANIZED HEALTH CARE EDUCATION/TRAINING PROGRAM

## 2024-10-31 PROCEDURE — 1111F DSCHRG MED/CURRENT MED MERGE: CPT | Mod: CPTII,S$GLB,, | Performed by: INTERNAL MEDICINE

## 2024-10-31 PROCEDURE — 99999 PR PBB SHADOW E&M-EST. PATIENT-LVL V: CPT | Mod: PBBFAC,,, | Performed by: STUDENT IN AN ORGANIZED HEALTH CARE EDUCATION/TRAINING PROGRAM

## 2024-10-31 PROCEDURE — 3075F SYST BP GE 130 - 139MM HG: CPT | Mod: CPTII,S$GLB,, | Performed by: STUDENT IN AN ORGANIZED HEALTH CARE EDUCATION/TRAINING PROGRAM

## 2024-10-31 PROCEDURE — 99214 OFFICE O/P EST MOD 30 MIN: CPT | Mod: S$GLB,,, | Performed by: INTERNAL MEDICINE

## 2024-10-31 PROCEDURE — 1160F RVW MEDS BY RX/DR IN RCRD: CPT | Mod: CPTII,S$GLB,, | Performed by: INTERNAL MEDICINE

## 2024-10-31 PROCEDURE — 3044F HG A1C LEVEL LT 7.0%: CPT | Mod: CPTII,S$GLB,, | Performed by: STUDENT IN AN ORGANIZED HEALTH CARE EDUCATION/TRAINING PROGRAM

## 2024-10-31 PROCEDURE — 3080F DIAST BP >= 90 MM HG: CPT | Mod: CPTII,S$GLB,, | Performed by: INTERNAL MEDICINE

## 2024-10-31 PROCEDURE — 80053 COMPREHEN METABOLIC PANEL: CPT | Performed by: STUDENT IN AN ORGANIZED HEALTH CARE EDUCATION/TRAINING PROGRAM

## 2024-10-31 PROCEDURE — 36415 COLL VENOUS BLD VENIPUNCTURE: CPT | Mod: PO | Performed by: STUDENT IN AN ORGANIZED HEALTH CARE EDUCATION/TRAINING PROGRAM

## 2024-10-31 PROCEDURE — 3008F BODY MASS INDEX DOCD: CPT | Mod: CPTII,S$GLB,, | Performed by: STUDENT IN AN ORGANIZED HEALTH CARE EDUCATION/TRAINING PROGRAM

## 2024-10-31 RX ORDER — METOPROLOL TARTRATE 25 MG/1
12.5 TABLET, FILM COATED ORAL 2 TIMES DAILY
Qty: 30 TABLET | Refills: 6 | Status: SHIPPED | OUTPATIENT
Start: 2024-10-31

## 2024-10-31 NOTE — PROGRESS NOTES
"Patient ID: Ruthy Ramires is a 43 y.o. male.    Chief Complaint: Establish Care    History of Present Illness    CHIEF COMPLAINT:  43 year old male presents to [facility]. He is new to me and new to this clinic.    CARDIOVASCULAR:  He reports experiencing high blood pressure and elevated heart rate since an alcohol relapse that ended approximately six weeks ago. He describes feeling like his heart is "beating out of his chest" at times. He was hospitalized for relapse of alcoholism for 3-4 days and got out at the beginning of October. He has been sober for about six weeks. He is monitoring his blood pressure at home and notes elevated readings. He reports a previous history of high blood pressure that resolved with lifestyle changes, including abstaining from alcohol, modifying diet, and increasing exercise - never needing medication in the past. He is currently walking again and working back up to 30 mins daily. We review technique for when checking blood pressure at home.    ALCOHOL USE AND MENTAL HEALTH:  He reports a history of alcoholism. He got it under control about ten years ago with rehabilitation. However, he experienced a relapse a few months ago, which ended approximately six weeks prior to the current visit. He denies current alcohol use. He follows with [facility] for anxiety and depression, Cheyenne Child, [place].    SLEEP:  He reports a history of trouble sleeping, but notes recent improvement. He has been able to sleep without medication.    GASTROINTESTINAL:  He reports a history of hiatal hernia, recently confirmed during an endoscopy. He has a history of alcoholic hepatitis without ascites. He also experiences acid reflux, which sometimes causes a fluttering sensation in his esophagus. He notes difficulty differentiating between this sensation and potential cardiac symptoms. He denies having had a colonoscopy but mentions it was recommended following a recent " hospitalization.    MEDICATIONS:  Current medications include Xanax 0.25mg (1.5 tablets as needed), Abilify 5mg at night, BuSpar 30mg twice daily, Zoloft 100mg daily, Hydroxyzine (Vistaril) as needed at night for sleep, magnesium for sleep, Pantoprazole (Protonix) daily, and Propranolol 20mg 3 times daily as needed.    SURGICAL HISTORY:  He reports a surgical history including multiple upper endoscopies and a nasal endoscopy in 2020.    FAMILY HISTORY:  He reports a family history of colon cancer in his grandmother (developed in her 90s and survived) and kidney stones in his father. He denies any known family history of heart disease or high blood pressure.    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 He typically lives alone in [place] but is currently staying with his parents on the [place] while recovering. He denies ever being a smoker and reports no current drug use. He is not sexually active at present.    RECENT LAB RESULTS:  He reports recent lab results showing normal vitamin B12, electrolytes, kidney function, blood sugar, and protein levels. However, liver enzymes were noted to be elevated. He has a scheduled follow-up visit with hepatology on November 26th to address the abnormal liver tests.    ENT:  He reports experiencing sinus problems, particularly in the right nostril, with difficulty clearing. He describes difficulty blowing his nose, stating that when he attempts to do so, it feels as though his eardrum might rupture. He denies a history of seasonal allergies. His uvula is pointing to one side, which he was previously unaware of. He denies any difficulty swallowing or headaches.    EXERCISE:  He reports currently walking for exercise. He previously walked at least 30 minutes daily, meeting the recommended 150 minutes of moderate aerobic activity per week. However, his exercise routine is now limited due to experiencing a high heart rate during physical activity, which prevents him  "from increasing his exercise intensity.    ROS: as above          Vitals:    10/31/24 1422 10/31/24 1515   BP: (!) 142/96 (!) 130/90   Pulse: (!) 121    SpO2: 95%    Weight: 108.3 kg (238 lb 12.1 oz)    Height: 6' 2" (1.88 m)      Body mass index is 30.65 kg/m².     Physical Exam    Assessment & Plan    Assessed recent history of alcohol use and subsequent cardiovascular symptoms  Noted improving elevated liver enzymes from recent labs  Evaluated persistent sinus tachycardia and palpitations  Considered propranolol for dual management of anxiety and tachycardia  Observed uvular deviation to left side, possible anatomical variant  Identified right nostril irritation and blockage    1. Encounter to establish care with new doctor    2. Alcohol abuse  - Six weeks sober.    3. Transaminitis  - COMPREHENSIVE METABOLIC PANEL; Future  - Continue to follow up with hepatology as scheduled.    4. Tachycardia  - Ambulatory referral/consult to Cardiology; Future  - Refer back to Dr. Hernandez.    5. Abnormal EKG  - Ambulatory referral/consult to Cardiology; Future    6. Abnormal finding of nose  - Ambulatory referral/consult to ENT; Future    7. Lesion of uvula  - Ambulatory referral/consult to ENT; Future    8. Anxiety and depression  - Continue current regimen and follow up with Cheyenne Child NP    FOLLOW UP:  - Follow up in 1 month to reassess blood pressure and heart rate control.  - Reach out through the portal with questions, concerns, and if urgent - always call.    Follow up in about 4 weeks (around 11/28/2024).    This note was generated with the assistance of ambient listening technology. Verbal consent was obtained by the patient and accompanying visitor(s) for the recording of patient appointment to facilitate this note. I attest to having reviewed and edited the generated note for accuracy, though some syntax or spelling errors may persist. Please contact the author of this note for any clarification.    Visit today " included increased complexity associated with the care of the episodic problem lesion of uvula addressed and managing the longitudinal care of the patient due to the serious and/or complex managed problem(s) transaminitis.

## 2024-11-01 ENCOUNTER — OFFICE VISIT (OUTPATIENT)
Dept: PSYCHIATRY | Facility: CLINIC | Age: 44
End: 2024-11-01
Payer: COMMERCIAL

## 2024-11-01 DIAGNOSIS — F10.11 ALCOHOL USE DISORDER, MILD, IN EARLY REMISSION, ABUSE: ICD-10-CM

## 2024-11-01 DIAGNOSIS — F41.1 GAD (GENERALIZED ANXIETY DISORDER): Primary | ICD-10-CM

## 2024-11-01 DIAGNOSIS — F33.1 MODERATE EPISODE OF RECURRENT MAJOR DEPRESSIVE DISORDER: ICD-10-CM

## 2024-11-10 PROBLEM — F32.A ANXIETY AND DEPRESSION: Status: ACTIVE | Noted: 2024-11-10

## 2024-11-10 PROBLEM — F41.9 ANXIETY AND DEPRESSION: Status: ACTIVE | Noted: 2024-11-10

## 2024-11-11 NOTE — PROGRESS NOTES
Otolaryngology Clinic Note    Subjective:       Patient ID: Ruthy Ramires is a 43 y.o. male.    Chief Complaint: Nasal Congestion and nasal blockage      History of Present Illness: Ruthy Ramires is a 43 y.o. male presenting with nasal congestion     He was refer by his PCP for concerns with his uvula and sinus concerns.    Per PCP note: He reports experiencing sinus problems, particularly in the right nostril, with difficulty clearing. He describes difficulty blowing his nose, stating that when he attempts to do so, it feels as though his eardrum might rupture. He denies a history of seasonal allergies. His uvula is pointing to one side, which he was previously unaware of. He denies any difficulty swallowing or headaches.     He has runny nose and congestion worse in the right side. He has occ PND. He has decreased smell. He denies any sinsu pain/pressure, colored mucous, headache, cough, dental pain, ear concerns, fever.  It has been for a couple months. He denies any hx of allergies in the past. He has never been evaluated by an allergist or required allergy testing. He alejandro snot have seasonal allergies. He denies any trauma to his nose. He has tried a nasal spray but not sure of the name and states it didn't help.     He denies any hx of asthma, nasal polyps, ASA sensitivity. He does snore but denies any hx of ZEN. He denies any tobacco use.     Past Surgical History:   Procedure Laterality Date    ESOPHAGOGASTRODUODENOSCOPY N/A 04/21/2020    Procedure: EGD (ESOPHAGOGASTRODUODENOSCOPY);  Surgeon: Aakash Lugo MD;  Location: [facility];  Service: Endoscopy;  Laterality: N/A;    NASAL ENDOSCOPY       Past Medical History:   Diagnosis Date    Alcohol abuse     Anxiety     Hiatal hernia     Panic attack      Social Drivers of Health     Tobacco Use: Low Risk  (11/12/2024)    Patient History     Smoking Tobacco Use: Never     Smokeless Tobacco Use: Never     Passive Exposure: Not on file    Alcohol Use: Alcohol Misuse (10/1/2024)    AUDIT-C     Frequency of Alcohol Consumption: 4 or more times a week     Average Number of Drinks: 3 or 4     Frequency of Binge Drinking: Less than monthly   Financial Resource Strain: Low Risk  (10/1/2024)    Overall Financial Resource Strain (CARDIA)     Difficulty of Paying Living Expenses: Not hard at all   Food Insecurity: No Food Insecurity (10/1/2024)    Hunger Vital Sign     Worried About Running Out of Food in the Last Year: Never true     Ran Out of Food in the Last Year: Never true   Transportation Needs: No Transportation Needs (10/1/2024)    TRANSPORTATION NEEDS     Transportation : No   Physical Activity: Inactive (10/1/2024)    Exercise Vital Sign     Days of Exercise per Week: 0 days     Minutes of Exercise per Session: 0 min   Stress: No Stress Concern Present (10/1/2024)    [employer] - Occupational Stress Questionnaire     Feeling of Stress : Not at all   Recent Concern: Stress - Stress Concern Present (9/30/2024)    [employer] - Occupational Stress Questionnaire     Feeling of Stress : To some extent   Housing Stability: Low Risk  (10/1/2024)    Housing Stability Vital Sign     Unable to Pay for Housing in the Last Year: No     Homeless in the Last Year: No   Depression: Low Risk  (5/14/2024)    Depression     Last PHQ-4: Flowsheet Data: 0   Utilities: Not At Risk (9/30/2024)    [facility] Utilities     Threatened with loss of utilities: No   Health Literacy: Adequate Health Literacy (9/30/2024)     Health Literacy     Frequency of need for help with medical instructions: Never   Social Isolation: Socially Integrated (10/1/2024)    Social Isolation     Social Isolation: 1     Review of patient's allergies indicates:  No Known Allergies  Current Outpatient Medications   Medication Instructions    ALPRAZolam (XANAX) 0.375 mg, Oral, Daily PRN    ARIPiprazole (ABILIFY) 5 mg, Oral, Nightly     azelastine (ASTELIN) 137 mcg, Nasal, 2 times daily    busPIRone (BUSPAR) 30 mg, Oral, 2 times daily    fluticasone propionate (FLONASE) 50 mcg, Each Nostril, 2 times daily    hydrOXYzine pamoate (VISTARIL) 50 mg, Oral, Nightly PRN    metoprolol tartrate (LOPRESSOR) 12.5 mg, Oral, 2 times daily    ondansetron (ZOFRAN-ODT) 4 MG TbDL Dissolve 1 tablet (4 mg total) by mouth every 6 (six) hours as needed (nausea).    pantoprazole (PROTONIX) 40 mg, Oral, Daily    predniSONE (DELTASONE) 40 mg, Oral, Daily, Take in am, take with food    propranoloL (INDERAL) 20 mg, Oral, 3 times daily PRN    sertraline (ZOLOFT) 100 mg, Oral, Daily         ENT ROS negative except as stated above.     Patient answers are not available for this visit.            Objective:      There were no vitals filed for this visit.    General: NAD, well appearing  Eyes: Normal conjunctiva and lids  Face: symmetric, nerve intact  Nose: The nose is without any evidence of any deformity. The nasal mucosa is moist. The septum is midline. There is no evidence of septal hematoma. The turbinates are without abnormality. Polyp noted in right nare.   Ears: The ears are with normal-appearing pinna. Cerumen impaction in left canal. Examination of the canals is normal appearing bilaterally. There is no drainage or erythema noted. The tympanic membranes are normal appearing with pearly color, normal-appearing landmarks and normal light reflex. Hearing is grossly intact.  Mouth: No obvious abnormalities to the lips. The teeth are unremarkable. The gingivae are without any obvious evidence of infection or lesion. The oral mucosa is moist and pink. There are no obvious masses to the hard or soft palate.   Oropharynx: The uvula is midline.  The tongue is midline. The posterior pharynx is without erythema or exudate. The tonsils are hard to visualize due to strong gag reflex   Salivary glands: The salivary glands are symmetric and not enlarged, no masses  Neck: No  lymphadenopathy, trachea midline, thryoid not enlarged.  Psych: Normal mood and affect.   Neuro: Grossly intact  Speech: fluent    Procedure:   Cerumen impaction removal  Indications: Cerumen impaction  Verbal consent obtained.   Under microscope, wax was removed from left ear using a curette instrumentation.   Patient tolerated procedure well.      Procedure: Flexible laryngoscopy  Indications: nasal congestion, deviated uvula  Verbal consent obtained  Anesthesia: lidocaine and phenylephrine nasal spray  Procedure: Scope was passed into right or left nare, to nasopharynx and down to visualize the glottis. Findings below. Patient tolerated procedure well.     - Nose nasal polyps, rhinorrhea    - Nasopharynx- WNL, no masses, pnd  - Oropharynx- BOT symmetric, no masses  - Hypopharynx and piriform sinuses- no masses on trumpet maneuver  - Supraglottis- Arytenoids intact, no masses, not edematous or erythematous  - Glottis- Bilateral vocal folds intact with full motion, no masses  - Glottic closure- complete     right      Left choana       Review: I personally reviewed HgA1c,  PCP note         Assessment and Plan:       1. Nasal polyps    2. Abnormal finding of nose    3. Nasal congestion    4. Impacted cerumen of left ear        - Steroids x1 week  - CT and follow-up with surgeon after CT   - Add Saline rinse twice daily, morning and night  - Flonase and Astelin twice daily after rinse    RTC: to see surgeon after CT     Plan of care was discussed in detail with the patient, who agreed with the plan as above. All questions were answered in detail.     Renu Solo, FNP-C  Otolaryngology

## 2024-11-12 ENCOUNTER — OFFICE VISIT (OUTPATIENT)
Dept: OTOLARYNGOLOGY | Facility: CLINIC | Age: 44
End: 2024-11-12
Payer: COMMERCIAL

## 2024-11-12 VITALS — WEIGHT: 243.19 LBS | HEIGHT: 74 IN | BODY MASS INDEX: 31.21 KG/M2

## 2024-11-12 DIAGNOSIS — H61.22 IMPACTED CERUMEN OF LEFT EAR: ICD-10-CM

## 2024-11-12 DIAGNOSIS — R68.89 ABNORMAL FINDING OF NOSE: ICD-10-CM

## 2024-11-12 DIAGNOSIS — J33.9 NASAL POLYPS: Primary | ICD-10-CM

## 2024-11-12 DIAGNOSIS — R09.81 NASAL CONGESTION: ICD-10-CM

## 2024-11-12 PROCEDURE — 1159F MED LIST DOCD IN RCRD: CPT | Mod: CPTII,S$GLB,, | Performed by: NURSE PRACTITIONER

## 2024-11-12 PROCEDURE — 3044F HG A1C LEVEL LT 7.0%: CPT | Mod: CPTII,S$GLB,, | Performed by: NURSE PRACTITIONER

## 2024-11-12 PROCEDURE — 3008F BODY MASS INDEX DOCD: CPT | Mod: CPTII,S$GLB,, | Performed by: NURSE PRACTITIONER

## 2024-11-12 PROCEDURE — 31575 DIAGNOSTIC LARYNGOSCOPY: CPT | Mod: S$GLB,,, | Performed by: NURSE PRACTITIONER

## 2024-11-12 PROCEDURE — 99999 PR PBB SHADOW E&M-EST. PATIENT-LVL III: CPT | Mod: PBBFAC,,, | Performed by: NURSE PRACTITIONER

## 2024-11-12 PROCEDURE — 99204 OFFICE O/P NEW MOD 45 MIN: CPT | Mod: 25,S$GLB,, | Performed by: NURSE PRACTITIONER

## 2024-11-12 PROCEDURE — 69210 REMOVE IMPACTED EAR WAX UNI: CPT | Mod: 51,S$GLB,, | Performed by: NURSE PRACTITIONER

## 2024-11-12 RX ORDER — FLUTICASONE PROPIONATE 50 MCG
1 SPRAY, SUSPENSION (ML) NASAL 2 TIMES DAILY
Qty: 16 G | Refills: 11 | Status: SHIPPED | OUTPATIENT
Start: 2024-11-12

## 2024-11-12 RX ORDER — AZELASTINE 1 MG/ML
1 SPRAY, METERED NASAL 2 TIMES DAILY
Qty: 30 ML | Refills: 11 | Status: SHIPPED | OUTPATIENT
Start: 2024-11-12 | End: 2025-11-12

## 2024-11-12 RX ORDER — PREDNISONE 20 MG/1
40 TABLET ORAL DAILY
Qty: 14 TABLET | Refills: 0 | Status: SHIPPED | OUTPATIENT
Start: 2024-11-12 | End: 2024-11-19

## 2024-11-12 RX ORDER — PREDNISONE 20 MG/1
20 TABLET ORAL DAILY
Qty: 5 TABLET | Refills: 0 | Status: SHIPPED | OUTPATIENT
Start: 2024-11-12 | End: 2024-11-12 | Stop reason: DRUGHIGH

## 2024-11-12 NOTE — PATIENT INSTRUCTIONS
"ENT SINUS REGIMEN:    "ENT-APPROVED" NASAL SPRAYS:  Flonase / fluticasone / Nasacort / Rhinocort (steroid spray) is best for stuffy, pressure, fullness. Available over-the-counter without a prescription.   Astepro / azelastine (antihistamine spray) is best for itchy, drippy, sneezy. Available over-the-counter without a prescription.       Use as directed, spraying 1 times in each nostril twice a day. It may take 2-3 days to 2-3 weeks to begin seeing improvement. This medication needs to be taken consistently to see results. Overall, this is a well-tolerated medication with low side effects. The benefit of nasal steroids as opposed to oral steroids is that the nasal steroid spray works primarily in the nose. Common side effects can include: headache, nasal dryness, minor nose bleed.  Rare side effects may include:  septal perforation, elevation in eye pressure, dry eyes, change in smell, allergic reaction.  Notify your provider if you have any concerns or experience these symptoms.     Nasal spray instructions:  Blow nose first gently to clean. Keep chin level with the floor (do not tilt head forward or back). Using the opposite hand (example: right hand for left nostril, left hand for right nostril) insert nasal spray taking caution to direct it AWAY from the middle wall inside the nose (septum) to avoid irritating nasal septum which could cause nosebleed.  Do not tilt spray up but rather flat and out along the roof of your mouth to spray. Angle the tip of the spray out slightly toward the direction of the ears; then spray. Do not take quick vigorous sniff but rather slow gentle inhalation while waiting for medication to absorb into nasal passages. Then administer second spray in same way.     Nasal saline rinse kit (use Neti pot or NeilMed sinus rinse kit) -- Rinse your sinuses once to twice daily to reduce the allergen burden in your nose. Use sterile water (boiled tap water which has cooled) or distilled bottled " water. Add 1/4 teaspoon sea salt and a pinch of baking soda or a mixture packet from the maker of your sinus rinse kit.  Rinse through both sides of nose to cleanse sinus and nasal passages, bending forward with head tilted down. Keep your mouth open, without holding your breath. Squeeze bottle gently until solution starts draining from the opposite nasal passage. After bottle is empty, blow nose very gently, without pinching nose completely, to avoid pressure on eardrums.  There are useful YouTube videos that show demonstration of how to do these properly.     Ponaris Nasal Emollient is used for the relief of: nasal congestion due to colds, nasal irritation, allergy exacerbations, nasal crusting. Specifically prepared iodized organic oils of pine, eucalyptus, peppermint, cajeput, and cottonseed. To order Ponaris: ask your pharmacist to order it for you or we carry it in our pharmacy downstairs on the first floor.      PLEASE PERFORM SINUS RINSES 2 TIMES DAILY UNTIL YOUR NEXT VISIT.          DIRECTIONS FOR SINUS SALINE RINSE To see demonstration: Enter http://www.payworks.com/watch?v=[email or web address] into the browser address box, or go to You tube, and under the search box, enter sinus rinse. Click on NeilMed Sinus Rinse Video    Step 1    Step 1 Please wash your hands. Fill the clean bottle with the designated volume of warm distilled water, filtered water or previously boiled water. You may warm the water in a microwave but we recommend that you warm it in increments of five seconds. This is to avoid excessive heating of the water and damage to the device or scalding your nasal passage.    Step 2    Step 2 Cut the SINUS RINSE mixture packet at the corner and pour its contents into the bottle. Tighten the cap & tube on the bottle securely, place one finger over the tip of the cap and shake the bottle gently to dissolve the mixture.      Step 3    Step 3 Standing in front of a sink, bend forward to your comfort  level and tilt your head down. Keeping your mouth open without holding your breath, place cap snugly against your nasal passage and SQUEEZE BOTTLE GENTLY until the solution starts draining from the OPPOSITE nasal passage or from your mouth. Keep squeezing the bottle GENTLY until at least 1/4 to 1/2 (60 to 120 mL) of the bottle is used for a proper rinse. Do not swallow the solution.    Step 4    Blow your nose gently, without pinching your nose completely because this will apply pressure on the eardrums. If tolerable, sniff in any residual solution remaining in the nasal passage once or twice prior to blowing your nose as this may clean out the posterior nasopharyngeal area (the area at the back of your nasal passage). Some solution will reach the back of the throat, so please spit it out. To help improve drainage of any residual solution, blow your nose gently while tilting your head to the opposite side of the nasal passage that you just rinsed.    Step 5    Now repeat steps 3 & 4 for your other nasal passage.    Step 6     Air dry the Sinus Rinse bottle, cap, and tube on a clean paper towel, a glass plate to store the bottle cap and tube. If there is any solution leftover, please discard it. We recommend you make a fresh solution each time you rinse. Rinse 5 times each day, OR as directed by your physician. Warnings: DO NOT RINSE IF NASAL PASSAGE IS COMPLETELY BLOCKED OR IF YOU HAVE AN EAR INFECTION OR BLOCKED EARS. If you have had ear surgery, please contact your physician prior to irrigation. If you experience any pressure in your ears, stop the rinse and get further directions from your physician or contact our office during regular business hours. To avoid any ear discomfort: Heat the solution to lukewarm, do not use hot, boiling or cold water. Keep your mouth open. Do not hold your breath and if possible make the sound CHRIST....CHRIST... Make sure to take the position as shown. Gently squeeze 1/4 of the bottle at  a time (60mL / 2 ounces). Stop the rinse if you feel any solution sensation near the ears. You may rinse with a partially blocked nasal passage. Please do not use for any other purposes. Please rinse at least ONE HOUR PRIOR to bedtime, in order to avoid any residual solution dripping in the throat.    >> Before using the SINUS RINSE kit, please inspect the cap, tube and bottle carefully for wear and tear. If any of the components appear discolored or cracked, please contact [employer] to obtain a replacement. You must follow the cleaning instructions provided in this brochure or cleaning instruction card prior to each use.    >> The SINUS RINSE bottle and mixture are to be used only for nasalirrigation. Do not use for any other purposes.    >> We recommend that you use the rinse ONE HOUR PRIOR to bedtime in order to avoid any residual solution dripping in the throat.    Tips to avoid ear discomfort while rinsing    If you have had ear surgery, please contact your physician prior to irrigation. Do not use if you have an ear infection or blocked ears. Rinse with lukewarm water. Keep your mouth open. Do not hold your breath while rinsing. While rinsing, make sure to tilt your. Gently squeeze the bottle while rinsing; do not squeeze the bottle very forcefully. Stop the rinse if you feel a sensation of fluid near your ears.    Tips to avoid unexpected drainage after rinsing    In rare situations, especially if you have had sinus surgery, the saline solution can pool in the sinus cavities and nasal passages and then drip from your nostrils hours after rinsing. To avoid this harmless but annoying inconvenience, take one extra step after rinsing: lean forward, tilt your head sideways and gently blow your nose. Then, tilt your head to the other side and blow again. You may need to repeat this several times. This will help rid your nasal passages of any excess mucus and remaining saline solution. If you find yourself  experiencing delayed drainage often, do not rinse right before leaving your house or going to bed.

## 2024-11-26 ENCOUNTER — OFFICE VISIT (OUTPATIENT)
Dept: HEPATOLOGY | Facility: CLINIC | Age: 44
End: 2024-11-26
Attending: INTERNAL MEDICINE
Payer: COMMERCIAL

## 2024-11-26 ENCOUNTER — LAB VISIT (OUTPATIENT)
Dept: LAB | Facility: HOSPITAL | Age: 44
End: 2024-11-26
Attending: INTERNAL MEDICINE
Payer: COMMERCIAL

## 2024-11-26 VITALS
TEMPERATURE: 98 F | RESPIRATION RATE: 18 BRPM | HEART RATE: 115 BPM | DIASTOLIC BLOOD PRESSURE: 98 MMHG | WEIGHT: 238.13 LBS | SYSTOLIC BLOOD PRESSURE: 140 MMHG | BODY MASS INDEX: 30.56 KG/M2 | HEIGHT: 74 IN | OXYGEN SATURATION: 95 %

## 2024-11-26 DIAGNOSIS — K70.10 ALCOHOLIC HEPATITIS WITHOUT ASCITES: ICD-10-CM

## 2024-11-26 DIAGNOSIS — F10.939 ALCOHOL WITHDRAWAL SYNDROME WITH COMPLICATION: ICD-10-CM

## 2024-11-26 DIAGNOSIS — K70.10 ALCOHOLIC HEPATITIS WITHOUT ASCITES: Primary | ICD-10-CM

## 2024-11-26 LAB
ALBUMIN SERPL BCP-MCNC: 4.3 G/DL (ref 3.5–5.2)
ALP SERPL-CCNC: 118 U/L (ref 40–150)
ALT SERPL W/O P-5'-P-CCNC: 88 U/L (ref 10–44)
ANION GAP SERPL CALC-SCNC: 12 MMOL/L (ref 8–16)
AST SERPL-CCNC: 108 U/L (ref 10–40)
BASOPHILS # BLD AUTO: 0.05 K/UL (ref 0–0.2)
BASOPHILS NFR BLD: 0.7 % (ref 0–1.9)
BILIRUB SERPL-MCNC: 3.6 MG/DL (ref 0.1–1)
BUN SERPL-MCNC: 9 MG/DL (ref 6–20)
CALCIUM SERPL-MCNC: 10.1 MG/DL (ref 8.7–10.5)
CHLORIDE SERPL-SCNC: 100 MMOL/L (ref 95–110)
CO2 SERPL-SCNC: 27 MMOL/L (ref 23–29)
CREAT SERPL-MCNC: 0.8 MG/DL (ref 0.5–1.4)
DIFFERENTIAL METHOD BLD: ABNORMAL
EOSINOPHIL # BLD AUTO: 0.3 K/UL (ref 0–0.5)
EOSINOPHIL NFR BLD: 3.9 % (ref 0–8)
ERYTHROCYTE [DISTWIDTH] IN BLOOD BY AUTOMATED COUNT: 14.1 % (ref 11.5–14.5)
EST. GFR  (NO RACE VARIABLE): >60 ML/MIN/1.73 M^2
GLUCOSE SERPL-MCNC: 121 MG/DL (ref 70–110)
HCT VFR BLD AUTO: 49.7 % (ref 40–54)
HGB BLD-MCNC: 17 G/DL (ref 14–18)
IMM GRANULOCYTES # BLD AUTO: 0.02 K/UL (ref 0–0.04)
IMM GRANULOCYTES NFR BLD AUTO: 0.3 % (ref 0–0.5)
INR PPP: 1.1 (ref 0.8–1.2)
LYMPHOCYTES # BLD AUTO: 1.3 K/UL (ref 1–4.8)
LYMPHOCYTES NFR BLD: 19.9 % (ref 18–48)
MCH RBC QN AUTO: 29.7 PG (ref 27–31)
MCHC RBC AUTO-ENTMCNC: 34.2 G/DL (ref 32–36)
MCV RBC AUTO: 87 FL (ref 82–98)
MONOCYTES # BLD AUTO: 0.5 K/UL (ref 0.3–1)
MONOCYTES NFR BLD: 7.5 % (ref 4–15)
NEUTROPHILS # BLD AUTO: 4.5 K/UL (ref 1.8–7.7)
NEUTROPHILS NFR BLD: 67.7 % (ref 38–73)
NRBC BLD-RTO: 0 /100 WBC
PLATELET # BLD AUTO: 146 K/UL (ref 150–450)
PMV BLD AUTO: 10.3 FL (ref 9.2–12.9)
POTASSIUM SERPL-SCNC: 3.7 MMOL/L (ref 3.5–5.1)
PROT SERPL-MCNC: 8.3 G/DL (ref 6–8.4)
PROTHROMBIN TIME: 11.9 SEC (ref 9–12.5)
RBC # BLD AUTO: 5.73 M/UL (ref 4.6–6.2)
SODIUM SERPL-SCNC: 139 MMOL/L (ref 136–145)
WBC # BLD AUTO: 6.69 K/UL (ref 3.9–12.7)

## 2024-11-26 PROCEDURE — 1159F MED LIST DOCD IN RCRD: CPT | Mod: CPTII,S$GLB,, | Performed by: INTERNAL MEDICINE

## 2024-11-26 PROCEDURE — 3080F DIAST BP >= 90 MM HG: CPT | Mod: CPTII,S$GLB,, | Performed by: INTERNAL MEDICINE

## 2024-11-26 PROCEDURE — 3044F HG A1C LEVEL LT 7.0%: CPT | Mod: CPTII,S$GLB,, | Performed by: INTERNAL MEDICINE

## 2024-11-26 PROCEDURE — 80053 COMPREHEN METABOLIC PANEL: CPT | Performed by: INTERNAL MEDICINE

## 2024-11-26 PROCEDURE — 80321 ALCOHOLS BIOMARKERS 1OR 2: CPT | Performed by: INTERNAL MEDICINE

## 2024-11-26 PROCEDURE — 85610 PROTHROMBIN TIME: CPT | Performed by: INTERNAL MEDICINE

## 2024-11-26 PROCEDURE — 99999 PR PBB SHADOW E&M-EST. PATIENT-LVL V: CPT | Mod: PBBFAC,,, | Performed by: INTERNAL MEDICINE

## 2024-11-26 PROCEDURE — 99203 OFFICE O/P NEW LOW 30 MIN: CPT | Mod: S$GLB,,, | Performed by: INTERNAL MEDICINE

## 2024-11-26 PROCEDURE — 1160F RVW MEDS BY RX/DR IN RCRD: CPT | Mod: CPTII,S$GLB,, | Performed by: INTERNAL MEDICINE

## 2024-11-26 PROCEDURE — 3008F BODY MASS INDEX DOCD: CPT | Mod: CPTII,S$GLB,, | Performed by: INTERNAL MEDICINE

## 2024-11-26 PROCEDURE — 3077F SYST BP >= 140 MM HG: CPT | Mod: CPTII,S$GLB,, | Performed by: INTERNAL MEDICINE

## 2024-11-26 PROCEDURE — 85025 COMPLETE CBC W/AUTO DIFF WBC: CPT | Performed by: INTERNAL MEDICINE

## 2024-11-26 NOTE — PROGRESS NOTES
HEPATOLOGY CONSULTATION    Referring Physician: Dr. Angelica Nuñez  Current Corresponding Physician: Dr. Michelle Jeffers    Reason for Consultation: Consultation for evaluation of Elevated Hepatic Enzymes    History of Present Illness: Ruthy Ramires is a 44 y.o. malewho presents for evaluation of   Chief Complaint   Patient presents with    Elevated Hepatic Enzymes   This 44-year-old gentleman was referred for evaluation and management of elevated liver enzymes and alcoholic hepatitis.  He was hospitalized at the end of September with alcohol withdrawal and a biochemical picture consistent with alcoholic hepatitis.  Cross-sectional imaging showed hepatic steatosis but no evidence of cirrhosis or portal hypertension.  His liver enzymes had been improving.  He had been completely abstinent from alcohol since discharge but says he had a relapse last week.  He has been through outpatient rehab twice.  He is currently followed by an addiction specialist.  He has no symptoms of chronic liver disease.  It seems like an underlying anxiety disorder drives his alcohol use disorder.    Past Medical History:   Diagnosis Date    Alcohol abuse     Anxiety     Hiatal hernia     Panic attack      Outpatient Encounter Medications as of 11/26/2024   Medication Sig Dispense Refill    ALPRAZolam (XANAX) 0.25 MG tablet Take 1.5 tablets (0.375 mg total) by mouth daily as needed for Anxiety. 45 tablet 0    ARIPiprazole (ABILIFY) 5 MG Tab Take 1 tablet (5 mg total) by mouth every evening. 90 tablet 1    azelastine (ASTELIN) 137 mcg (0.1 %) nasal spray 1 spray (137 mcg total) by Nasal route 2 (two) times daily. 30 mL 11    busPIRone (BUSPAR) 30 MG Tab Take 1 tablet (30 mg total) by mouth 2 (two) times daily. 180 tablet 1    fluticasone propionate (FLONASE) 50 mcg/actuation nasal spray 1 spray (50 mcg total) by Each Nostril route 2 (two) times a day. 16 g 11    hydrOXYzine pamoate (VISTARIL) 50 MG Cap Take 1 capsule (50 mg total) by  mouth nightly as needed (insomnia). 90 capsule 1    metoprolol tartrate (LOPRESSOR) 25 MG tablet Take 0.5 tablets (12.5 mg total) by mouth 2 (two) times daily. 30 tablet 6    sertraline (ZOLOFT) 100 MG tablet Take 1 tablet (100 mg total) by mouth once daily. 90 tablet 0    ondansetron (ZOFRAN-ODT) 4 MG TbDL Dissolve 1 tablet (4 mg total) by mouth every 6 (six) hours as needed (nausea). (Patient not taking: Reported on 10/31/2024) 20 tablet 0    pantoprazole (PROTONIX) 40 MG tablet Take 1 tablet (40 mg total) by mouth once daily. 90 tablet 4    [] predniSONE (DELTASONE) 20 MG tablet Take 2 tablets (40 mg total) by mouth once daily. Take in am, take with food for 7 days 14 tablet 0    propranoloL (INDERAL) 20 MG tablet Take 1 tablet (20 mg total) by mouth 3 (three) times daily as needed (anxiety). (Patient not taking: Reported on 2024) 90 tablet 1    [DISCONTINUED] ALPRAZolam (XANAX) 0.25 MG tablet Take 1 tablet (0.25 mg total) by mouth daily as needed for Anxiety. 30 tablet 0    [DISCONTINUED] diazePAM (VALIUM) 5 MG tablet Take 1 tablet (5 mg total) by mouth 2 (two) times a day for 3 days, THEN 1 tablet (5 mg total) once daily for 3 days. (Patient not taking: Reported on 10/8/2024) 9 tablet 0    [DISCONTINUED] propranoloL (INDERAL) 10 MG tablet Take 1 tablet (10 mg total) by mouth 3 (three) times daily as needed (anxiety). 90 tablet 1     No facility-administered encounter medications on file as of 2024.     Review of patient's allergies indicates:  No Known Allergies  Family History   Problem Relation Name Age of Onset    No Known Problems Mother      Nephrolithiasis Father      Cancer Maternal Grandmother  90        colon    Heart disease Neg Hx      Hypertension Neg Hx         Social History     Socioeconomic History    Marital status:    Occupational History    Occupation: CPA     Comment: [employer]   Tobacco Use    Smoking status: Never    Smokeless tobacco: Never   Substance and Sexual  Activity    Alcohol use: Not Currently    Drug use: Never    Sexual activity: Not Currently   Social History Narrative    Lives in [place] alone. He stays on the [facility] by his parents. No pets.     Social Drivers of Health     Financial Resource Strain: Low Risk  (10/1/2024)    Overall Financial Resource Strain (CARDIA)     Difficulty of Paying Living Expenses: Not hard at all   Food Insecurity: No Food Insecurity (10/1/2024)    Hunger Vital Sign     Worried About Running Out of Food in the Last Year: Never true     Ran Out of Food in the Last Year: Never true   Transportation Needs: No Transportation Needs (10/1/2024)    TRANSPORTATION NEEDS     Transportation : No   Physical Activity: Inactive (10/1/2024)    Exercise Vital Sign     Days of Exercise per Week: 0 days     Minutes of Exercise per Session: 0 min   Stress: No Stress Concern Present (10/1/2024)    [place] [employer] - Occupational Stress Questionnaire     Feeling of Stress : Not at all   Recent Concern: Stress - Stress Concern Present (9/30/2024)    [place] - Occupational Stress Questionnaire     Feeling of Stress : To some extent   Housing Stability: Low Risk  (10/1/2024)    Housing Stability Vital Sign     Unable to Pay for Housing in the Last Year: No     Homeless in the Last Year: No     Review of Systems   Constitutional:  Negative for activity change, appetite change, chills, fatigue and unexpected weight change.   HENT:  Negative for congestion, facial swelling and tinnitus.    Eyes:  Negative for visual disturbance.   Respiratory:  Negative for cough, shortness of breath and wheezing.    Cardiovascular:  Negative for chest pain and palpitations.   Gastrointestinal:  Negative for abdominal distention.   Genitourinary:  Negative for dysuria.   Musculoskeletal:  Negative for arthralgias, joint swelling and myalgias.   Neurological:  Negative for syncope and headaches.   Hematological:   Does not bruise/bleed easily.   Psychiatric/Behavioral:  Negative for confusion. The patient is nervous/anxious.      Vitals:    11/26/24 0840   BP: (!) 140/98   Pulse: (!) 115   Resp: 18   Temp: 98 °F (36.7 °C)       Physical Exam  Constitutional:       Appearance: He is well-developed.   Eyes:      General: No scleral icterus.  Cardiovascular:      Rate and Rhythm: Normal rate and regular rhythm.      Heart sounds: Normal heart sounds.   Pulmonary:      Effort: Pulmonary effort is normal. No respiratory distress.      Breath sounds: Normal breath sounds. No wheezing.   Abdominal:      General: Bowel sounds are normal. There is no distension.      Palpations: Abdomen is soft. There is no mass.      Tenderness: There is no abdominal tenderness. There is no rebound.   Musculoskeletal:         General: Normal range of motion.   Lymphadenopathy:      Cervical: No cervical adenopathy.   Skin:     General: Skin is warm and dry.   Neurological:      Mental Status: He is alert and oriented to person, place, and time.         Computed MELD 3.0 unavailable. One or more values for this score either were not found within the given timeframe or did not fit some other criterion.  Computed MELD-Na unavailable. One or more values for this score either were not found within the given timeframe or did not fit some other criterion.      Lab Results   Component Value Date     10/31/2024    BUN 10 10/31/2024    CREATININE 1.0 10/31/2024    CALCIUM 9.8 10/31/2024     10/31/2024    K 4.2 10/31/2024     10/31/2024    PROT 7.5 10/31/2024    CO2 28 10/31/2024    ANIONGAP 9 10/31/2024    WBC 3.68 (L) 10/02/2024    RBC 4.90 10/02/2024    HGB 13.9 (L) 10/02/2024    HCT 42.1 10/02/2024    HCT 32 (L) 06/14/2020    MCV 86 10/02/2024    MCH 28.4 10/02/2024    MCHC 33.0 10/02/2024     Lab Results   Component Value Date    RDW 12.7 10/02/2024    PLT 91 (L) 10/02/2024    MPV 10.4 10/02/2024    GRAN 2.3 10/02/2024    GRAN 63.1  10/02/2024    LYMPH 1.0 10/02/2024    LYMPH 28.3 10/02/2024    MONO 0.2 (L) 10/02/2024    MONO 5.4 10/02/2024    EOSINOPHIL 2.2 10/02/2024    BASOPHIL 0.5 10/02/2024    EOS 0.1 10/02/2024    BASO 0.02 10/02/2024    APTT 37.0 (H) 04/20/2020    GROUPTRH O POS 04/20/2020    CHOL 177 05/14/2024    TRIG 97 05/14/2024    HDL 46 05/14/2024    CHOLHDL 26.0 05/14/2024    TOTALCHOLEST 3.8 05/14/2024    ALBUMIN 3.9 10/31/2024    BILIDIR 0.4 (H) 05/16/2017    AST 90 (H) 10/31/2024    ALT 90 (H) 10/31/2024    ALKPHOS 102 10/31/2024    MG 2.0 10/10/2024    LABPROT 12.4 09/29/2024    INR 1.1 09/29/2024    PSA 1.0 05/14/2024       Assessment and Plan:  Patient Active Problem List   Diagnosis    Alcohol withdrawal    Hiatal hernia    JOVANNI (generalized anxiety disorder)    Hypokalemia    Moderate episode of recurrent major depressive disorder    Insomnia, psychophysiological    Alcohol abuse    Prediabetes    Tachycardia    Alcoholic hepatitis without ascites    Hypophosphatemia    Thrombocytopenia    Normocytic anemia    Abnormal finding of nose    Lesion of uvula    Abnormal EKG    Transaminitis    Anxiety and depression     Ruthy Ramires is a 44 y.o. male withElevated Hepatic Enzymes    Impression:    Elevated liver enzymes and alcoholic hepatitis without evidence of advanced chronic liver disease.   Ongoing alcohol use disorder.    Plan:   As he has already connected with an addiction specialist, I will not be needing to make a referral.  I will be checking his liver biochemistry today.  I encouraged strict abstinence from alcohol and I discussed the natural history of his liver disease were he to continue heavy alcohol consumption.  I will arrange follow-up in 3 months' time.

## 2024-11-29 DIAGNOSIS — F41.1 GAD (GENERALIZED ANXIETY DISORDER): ICD-10-CM

## 2024-11-29 LAB
CLINICAL BIOCHEMIST REVIEW: NORMAL
PLPETH BLD-MCNC: 1138 NG/ML
POPETH BLD-MCNC: 1047 NG/ML

## 2024-11-29 RX ORDER — ALPRAZOLAM 0.25 MG/1
0.38 TABLET ORAL DAILY PRN
Qty: 45 TABLET | Refills: 0 | Status: SHIPPED | OUTPATIENT
Start: 2024-11-29 | End: 2024-12-29

## 2024-11-30 DIAGNOSIS — F33.1 MODERATE EPISODE OF RECURRENT MAJOR DEPRESSIVE DISORDER: ICD-10-CM

## 2024-11-30 DIAGNOSIS — F41.1 GAD (GENERALIZED ANXIETY DISORDER): ICD-10-CM

## 2024-12-02 RX ORDER — SERTRALINE HYDROCHLORIDE 100 MG/1
100 TABLET, FILM COATED ORAL DAILY
Qty: 90 TABLET | Refills: 0 | Status: SHIPPED | OUTPATIENT
Start: 2024-12-02 | End: 2024-12-03

## 2024-12-02 NOTE — PROGRESS NOTES
Outpatient Psychiatry Follow-Up Visit    Clinical Status of Patient: Outpatient (Ambulatory)  12/03/2024    The patient location is:  [place], [place]  The patient phone number is: [phone]   Visit type: Virtual visit with synchronous audio and video  Each patient to whom he or she provides medical services by telemedicine is:  (1) informed of the relationship between the physician and patient and the respective role of any other health care provider with respect to management of the patient; and (2) notified that he or she may decline to receive medical services by telemedicine and may withdraw from such care at any time.     Chief Complaint: Pt is a 44 y.o. male who presents today for a follow-up. Met with patient.       Interval History and Content of Current Session:  Interim Events/Subjective Report/Content of Current Session:  follow up appointment.    Pt is a 44 y.o. male with past psychiatric hx of Moderate episode of recurrent major depressive disorder, JOVANNI (generalized anxiety disorder), Insomnia, psychophysiological, Alcohol abuse who presents for follow up treatment.     Past Psychiatric hx:   Pt. is a 41 yo M with a past psychiatric hx of Anxiety, Panic attack, Alcohol abuse, Moderate episode of recurrent major depressive disorder presenting to the clinic for an initial evaluation and treatment. Past medical history outlined below. He is currently taking ALPRAZolam (XANAX), paroxetine (PAXIL), prescribed and managed by Dr. Herrera. He reports that these medications have not worked to effectively treat his depression/anxiety. He reports worsening depression and has not felt that the Paxil has worked, states he has been on it for 7 years and would like to try a different medication. Was prescribed Wellbutrin recently in addition to Paxil and stated that it worsened his anxiety significantly, reported that he stopped taking it. States that the Xanax helps, does not report any panic attacks, but continues  "to experience s/s of anxiety. Admits that he has had anxiety a good part of his life and "deals with it", but believes that it is adding to his depression currently. Reports good sleep, "it is thankfully good, and probably the only relief I get". Denies any SI or thoughts of self harm, denies HI/AVH, denies any suicide attempts in the past or psych IP admissions. "The depression lately is what worries me the most, I have tried the online therapy but I would like to see someone for therapy to help".     10/30/24: Pt presents to OP Psychiatry for routine follow-up for treatment/medication management of MDD, JOVANNI, Insomnia, Alcohol abuse. Pt states he took a leave of absence from work to deal with his mental health, reports increased anxiety and took his Xanax thinking it was BID and ran out early this month. Stressed the importance of not overtaking controlled medication as it could lead to another relapse. Pt aware and asked if it could be increased. Discussed increasing the Inderal to 20 mg and trying to lean on that more before taking an additional dose of Xanax. Discussed dispensing a max of 45 tabs per month starting this month. States he felt it was more manageable when he took it BID, but explained the risks of relapse. Pt verbalized understanding. Denies need to change any other medications at this time. Pt denies SI/HI/AVH, self-harm, plan, or intent. Pt verbalizes understanding of medication instructions through teach back. No other issues, concerns, or problems, will reassess symptoms and medications in 5 weeks or PRN if symptoms worsen.     Past Medical hx:   Past Medical History:   Diagnosis Date    Alcohol abuse     Anxiety     Hiatal hernia     Panic attack         Interim hx:  Medication changes last visit:     1. Continue sertraline (ZOLOFT) 100 MG tablet - Take 1 tablet (100 mg total) by mouth once daily for depression/anxiety. Discussed potential for GI side effects, sexual dysfunction, mood " "destabilization, headaches  Discussed potential for GI side effects, sexual dysfunction, mood destabilization, headaches.   2. Continue ARIPiprazole (ABILIFY) 5 MG Tab - Take 1 tablet (5 mg total) by mouth once daily adjunct therapy for depression. Typical LEX's reviewed including weight gain, abnormal movements, EPS, TD, metabolic side effects.   3. Continue busPIRone (BUSPAR) 30 MG tablet - Take 1 tablet (30 mg total) by mouth 2 (two) times daily for anxiety.  4. Continue hydrOXYzine pamoate (VISTARIL) 50 MG Cap - Take 1 capsule (50 mg total) by mouth daily as needed for insomnia.  5. Increase ALPRAZolam (XANAX) 0.25 MG tablet - Take 1.5 tablets (0.375 mg total) by mouth once daily as needed for Anxiety. Discussed risk of decreased RT, sedation, addictive potential, and not to mix with alcohol.    6. Increase propranoloL (INDERAL) 20 MG tablet - Take 1 tablet (20 mg total) by mouth 3 (three) times daily as needed (anxiety).         Anxiety: "Hit or miss", discussed with cardiologist and ok to use Inderal PRN  Depression: Good, manageable  Sleep: Improved  Appetite: Good, no issues     Denies suicidal/homicidal ideations.  Denies hopelessness/worthlessness.    Denies auditory/visual hallucinations.      Tobacco: denies  Alcohol: denies (in recovery since 2020, relapse - last drink 9/2/24)  Drug use: denies  Caffeine: 1 cup a day        Review of Systems   PSYCHIATRIC: Pertinent items are noted in the narrative.        CONSTITUTIONAL: weight stable        M/S: no pain today         ENT: no allergies noted today        ABD: no n/v/d     Past Medical, Family and Social History: The patient's past medical, family and social history have been reviewed and updated as appropriate within the electronic medical record. See encounter notes.     Medication Compliance: yes      Side effects: tolerates     Risk Parameters:  Patient reports no suicidal ideation  Patient reports no homicidal ideation  Patient reports no " self-injurious behavior  Patient reports no violent behavior     Exam (detailed: at least 9 elements; comprehensive: all 15 elements)   Constitutional  Vitals:  Most recent vital signs, dated less than 90 days prior to this appointment, were reviewed.     General:  unremarkable, age appropriate, casual attire, good eye contact, good rapport       Musculoskeletal  Muscle Strength/Tone:  no flaccidity, no tremor    Gait & Station:  normal      Psychiatric                       Speech:  normal tone, normal rate, rhythm, and volume   Mood & Affect:   Euthymic, congruent, appropriate         Thought Process:   Goal directed; Linear    Associations:   intact   Thought Content:   No SI/HI, delusions, or paranoia, no AV/VH   Insight & Judgement:   Good, adequate to circumstances   Orientation:   grossly intact; alert and oriented x 4    Memory:  intact for content of interview    Language:  grossly intact, can repeat    Attention Span  : Grossly intact for content of interview   Fund of Knowledge:   intact and appropriate to age and level of education        Assessment and Diagnosis   Status/Progress: Based on the examination today, the patient's problem(s) is/are under fair control.  New problems have not been presented today. Comorbidities are not currently complicating management of the primary condition.      Impression:  Pt presents to OP Psychiatry for routine follow-up for treatment/medication management of MDD, JOVANNI, Insomnia, Alcohol abuse. Pt reports starting new beta blocker by cardiology and unsure if ok to take Inderal PRN for acute panic attack, consulted with cardiologist during virtual visit with pt and ok to use. Pt verbalized understanding. Would also like to try a medication to help with alcohol cravings, discussed Naltrexone. Pt verbalizes not on any opiate. Would like to try. No other medication changes at this time. Pt denies SI/HI/AVH, self-harm, plan, or intent. Pt verbalizes understanding of  medication instructions through teach back. No other issues, concerns, or problems, will reassess symptoms and medications in 30 days or PRN if symptoms worsen.      Diagnosis:   - Moderate episode of recurrent major depressive disorder  - JOVANNI (generalized anxiety disorder)  - Insomnia, psychophysiological  - Alcohol abuse      Intervention/Counseling/Treatment Plan   Medication Management:      1. Continue sertraline (ZOLOFT) 100 MG tablet - Take 1 tablet (100 mg total) by mouth once daily for depression/anxiety. Discussed potential for GI side effects, sexual dysfunction, mood destabilization, headaches  Discussed potential for GI side effects, sexual dysfunction, mood destabilization, headaches.      2. Continue ARIPiprazole (ABILIFY) 5 MG Tab - Take 1 tablet (5 mg total) by mouth once daily adjunct therapy for depression. Typical LEX's reviewed including weight gain, abnormal movements, EPS, TD, metabolic side effects.      3. Continue busPIRone (BUSPAR) 30 MG tablet - Take 1 tablet (30 mg total) by mouth 2 (two) times daily for anxiety.     4. Continue hydrOXYzine pamoate (VISTARIL) 50 MG Cap - Take 1 capsule (50 mg total) by mouth daily as needed for insomnia.     5. Continue ALPRAZolam (XANAX) 0.25 MG tablet - Take 1.5 tablets (0.375 mg total) by mouth once daily as needed for Anxiety. Discussed risk of decreased RT, sedation, addictive potential, and not to mix with alcohol.       6. Continue propranoloL (INDERAL) 20 MG tablet - Take 1 tablet (20 mg total) by mouth 3 (three) times daily as needed (anxiety).    7. Start naltrexone (DEPADE) 50 mg tablet - Take 1 tablet (50 mg total) by mouth nightly for alcohol cravings.      8. Call to report any worsening of symptoms or problems with the medication. Pt instructed to go to ER with thoughts of harming self, others     9. Patient given contact # for psychotherapists at [facility] and also instructed she may check with insurance for list of providers.          Labs: none         Return to clinic:      30 days or PRN if symptoms worsen    -Spent 30min face to face with the pt; >50% time spent in counseling   -Supportive therapy and psychoeducation provided  -R/B/SE's of medications discussed with the pt who expresses understanding and chooses to take medications as prescribed.   -Pt instructed to call clinic, 911 or go to nearest emergency room if sxs worsen or pt is in   crisis. The pt expresses understanding.      Cheyenne Child NP  Psychiatric-Mental Health Nurse Practitioner  Department of Psychiatry    [facility] [place] [address] [place] [address]  Phone:  [phone]  Fax: [phone]

## 2024-12-03 ENCOUNTER — OFFICE VISIT (OUTPATIENT)
Dept: PSYCHIATRY | Facility: CLINIC | Age: 44
End: 2024-12-03
Payer: COMMERCIAL

## 2024-12-03 DIAGNOSIS — F51.04 INSOMNIA, PSYCHOPHYSIOLOGICAL: ICD-10-CM

## 2024-12-03 DIAGNOSIS — F41.1 GAD (GENERALIZED ANXIETY DISORDER): ICD-10-CM

## 2024-12-03 DIAGNOSIS — F10.10 ALCOHOL ABUSE: Primary | ICD-10-CM

## 2024-12-03 DIAGNOSIS — F33.1 MODERATE EPISODE OF RECURRENT MAJOR DEPRESSIVE DISORDER: ICD-10-CM

## 2024-12-03 PROCEDURE — 99214 OFFICE O/P EST MOD 30 MIN: CPT | Mod: 95,,,

## 2024-12-03 PROCEDURE — 1160F RVW MEDS BY RX/DR IN RCRD: CPT | Mod: CPTII,95,,

## 2024-12-03 PROCEDURE — 3044F HG A1C LEVEL LT 7.0%: CPT | Mod: CPTII,95,,

## 2024-12-03 PROCEDURE — 1159F MED LIST DOCD IN RCRD: CPT | Mod: CPTII,95,,

## 2024-12-03 RX ORDER — NALTREXONE HYDROCHLORIDE 50 MG/1
50 TABLET, FILM COATED ORAL NIGHTLY
Qty: 30 TABLET | Refills: 0 | Status: SHIPPED | OUTPATIENT
Start: 2024-12-03 | End: 2025-01-02

## 2024-12-03 RX ORDER — ARIPIPRAZOLE 5 MG/1
5 TABLET ORAL NIGHTLY
Qty: 90 TABLET | Refills: 0 | Status: SHIPPED | OUTPATIENT
Start: 2024-12-03 | End: 2025-03-03

## 2024-12-03 RX ORDER — BUSPIRONE HYDROCHLORIDE 30 MG/1
30 TABLET ORAL 2 TIMES DAILY
Qty: 180 TABLET | Refills: 0 | Status: SHIPPED | OUTPATIENT
Start: 2024-12-03 | End: 2025-03-03

## 2024-12-03 RX ORDER — SERTRALINE HYDROCHLORIDE 100 MG/1
100 TABLET, FILM COATED ORAL DAILY
Qty: 90 TABLET | Refills: 0 | Status: SHIPPED | OUTPATIENT
Start: 2024-12-03 | End: 2025-03-03

## 2024-12-04 ENCOUNTER — OFFICE VISIT (OUTPATIENT)
Dept: PSYCHIATRY | Facility: CLINIC | Age: 44
End: 2024-12-04
Payer: COMMERCIAL

## 2024-12-04 DIAGNOSIS — F33.1 MODERATE EPISODE OF RECURRENT MAJOR DEPRESSIVE DISORDER: Primary | ICD-10-CM

## 2024-12-04 DIAGNOSIS — F41.1 GAD (GENERALIZED ANXIETY DISORDER): ICD-10-CM

## 2024-12-04 DIAGNOSIS — F10.11 ALCOHOL USE DISORDER, MILD, IN EARLY REMISSION, ABUSE: ICD-10-CM

## 2024-12-04 PROCEDURE — 90832 PSYTX W PT 30 MINUTES: CPT | Mod: S$GLB,,, | Performed by: SOCIAL WORKER

## 2024-12-04 PROCEDURE — 3044F HG A1C LEVEL LT 7.0%: CPT | Mod: CPTII,S$GLB,, | Performed by: SOCIAL WORKER

## 2024-12-04 NOTE — PROGRESS NOTES
Individual Psychotherapy (PhD/LCSW)    12/04/2024    Interim Events/Subjective Report/Content of Current Session:  follow-up appointment.    Pt is a 44 y.o. male with past psychiatric hx of  depression anxiety alcohol abuse who presents for follow-up treatment.    Pt reported current status towards recovery and managing emotions.  Pt stated he has continued his [facility] therapy.     Pt stated the past month he relapsed, feeling anxious, depression, more cravings.   Pt discussed that he knows that when somatic symptoms of anxiety is present, he has urges to drink alcohol.   Provided assistance with somatic symptoms.   Reported heart palpations, paralysis, physically unstable like he is going to pass out, and then becomes hyperaware and causing more panic. Provided psychoeducation regarding panic and what may be causing pt to feel unsafe as panic is present vs generalized anxiety symptoms per pt symptoms report.   TIPP and REST acronyms discussed. Ct having difficulty identifying emotions being experienced. Ct preformed exercise using feelings wheel to identify specific emotions being experienced.       Current symptoms:  Depression: dysphoric mood.  Anxiety: excessive worrying and obsessions/compulsions.  Sleep: denied.  Melony:  denies.  Psychosis: denies .    Will continue to follow. Will review TIPP next session.  Pt aware to contact sw for any additional needs that may occur prior to next session.  Therapeutic Intervention/Techniques: behavior modification, insight oriented, interactive, and supportive; relevant to diagnosis, patient responds to this modality    Risk Parameters:  Patient reports no suicidal ideation  Patient reports no homicidal ideation  Patient reports no self-injurious behavior  Patient reports no violent behavior    Diagnosis:   1. Moderate episode of recurrent major depressive disorder        2. JOVANNI (generalized anxiety disorder)        3. Alcohol use disorder, mild, in early remission, abuse                   Return to Clinic: as scheduled  Counseling time: 45  -Call to report any worsening of symptoms or problems associated with medication.  - Pt instructed to go to ER if thoughts of harming self or others arise.   -Supportive therapy and psychoeducation provided  -Pt instructed to call clinic, 911 or go to nearest emergency room if sxs worsen or pt is in crisis. The pt expresses understanding.   Each patient to whom he or she provides medical services by telemedicine is:  (1) informed of the relationship between the physician and patient and the respective role of any other health care provider with respect to management of the patient; and (2) notified that he or she may decline to receive medical services by telemedicine and may withdraw from such care at any time.

## 2025-01-02 ENCOUNTER — TELEPHONE (OUTPATIENT)
Dept: PSYCHIATRY | Facility: CLINIC | Age: 45
End: 2025-01-02
Payer: COMMERCIAL

## 2025-01-29 ENCOUNTER — PATIENT MESSAGE (OUTPATIENT)
Dept: ADMINISTRATIVE | Facility: HOSPITAL | Age: 45
End: 2025-01-29
Payer: COMMERCIAL

## 2025-01-30 DIAGNOSIS — Z12.11 SCREENING FOR COLON CANCER: ICD-10-CM

## 2025-02-11 ENCOUNTER — OFFICE VISIT (OUTPATIENT)
Dept: FAMILY MEDICINE | Facility: CLINIC | Age: 45
End: 2025-02-11
Payer: COMMERCIAL

## 2025-02-11 VITALS
OXYGEN SATURATION: 98 % | HEART RATE: 69 BPM | DIASTOLIC BLOOD PRESSURE: 80 MMHG | BODY MASS INDEX: 29.78 KG/M2 | WEIGHT: 231.94 LBS | SYSTOLIC BLOOD PRESSURE: 110 MMHG

## 2025-02-11 DIAGNOSIS — F10.10 ALCOHOL ABUSE: Primary | ICD-10-CM

## 2025-02-11 DIAGNOSIS — R00.0 TACHYCARDIA: ICD-10-CM

## 2025-02-11 DIAGNOSIS — R74.01 TRANSAMINITIS: ICD-10-CM

## 2025-02-11 DIAGNOSIS — R68.89 ABNORMAL FINDING OF NOSE: ICD-10-CM

## 2025-02-11 DIAGNOSIS — F32.A ANXIETY AND DEPRESSION: ICD-10-CM

## 2025-02-11 DIAGNOSIS — F41.9 ANXIETY AND DEPRESSION: ICD-10-CM

## 2025-02-11 PROCEDURE — 1160F RVW MEDS BY RX/DR IN RCRD: CPT | Mod: CPTII,S$GLB,, | Performed by: STUDENT IN AN ORGANIZED HEALTH CARE EDUCATION/TRAINING PROGRAM

## 2025-02-11 PROCEDURE — 3074F SYST BP LT 130 MM HG: CPT | Mod: CPTII,S$GLB,, | Performed by: STUDENT IN AN ORGANIZED HEALTH CARE EDUCATION/TRAINING PROGRAM

## 2025-02-11 PROCEDURE — 3008F BODY MASS INDEX DOCD: CPT | Mod: CPTII,S$GLB,, | Performed by: STUDENT IN AN ORGANIZED HEALTH CARE EDUCATION/TRAINING PROGRAM

## 2025-02-11 PROCEDURE — 99999 PR PBB SHADOW E&M-EST. PATIENT-LVL IV: CPT | Mod: PBBFAC,,, | Performed by: STUDENT IN AN ORGANIZED HEALTH CARE EDUCATION/TRAINING PROGRAM

## 2025-02-11 PROCEDURE — 1159F MED LIST DOCD IN RCRD: CPT | Mod: CPTII,S$GLB,, | Performed by: STUDENT IN AN ORGANIZED HEALTH CARE EDUCATION/TRAINING PROGRAM

## 2025-02-11 PROCEDURE — 99214 OFFICE O/P EST MOD 30 MIN: CPT | Mod: S$GLB,,, | Performed by: STUDENT IN AN ORGANIZED HEALTH CARE EDUCATION/TRAINING PROGRAM

## 2025-02-11 PROCEDURE — 3079F DIAST BP 80-89 MM HG: CPT | Mod: CPTII,S$GLB,, | Performed by: STUDENT IN AN ORGANIZED HEALTH CARE EDUCATION/TRAINING PROGRAM

## 2025-02-11 RX ORDER — FOLIC ACID 1 MG/1
1000 TABLET ORAL
COMMUNITY
Start: 2025-01-27

## 2025-02-11 RX ORDER — HYDROCHLOROTHIAZIDE 12.5 MG/1
12.5 CAPSULE ORAL
COMMUNITY
Start: 2025-01-22

## 2025-02-11 RX ORDER — SERTRALINE HYDROCHLORIDE 50 MG/1
50 TABLET, FILM COATED ORAL
COMMUNITY
Start: 2024-12-31 | End: 2025-02-11

## 2025-02-11 RX ORDER — MIRTAZAPINE 7.5 MG/1
7.5 TABLET, FILM COATED ORAL NIGHTLY PRN
COMMUNITY
Start: 2025-02-04 | End: 2025-02-11

## 2025-02-11 RX ORDER — NALTREXONE 380 MG
KIT INTRAMUSCULAR
COMMUNITY
Start: 2025-02-07

## 2025-02-11 RX ORDER — VIT C/E/ZN/COPPR/LUTEIN/ZEAXAN 250MG-90MG
5000 CAPSULE ORAL EVERY MORNING
COMMUNITY
Start: 2025-01-27

## 2025-02-11 RX ORDER — LANOLIN ALCOHOL/MO/W.PET/CERES
100 CREAM (GRAM) TOPICAL EVERY MORNING
COMMUNITY
Start: 2024-12-30

## 2025-02-11 RX ORDER — PROPRANOLOL HYDROCHLORIDE 10 MG/1
10 TABLET ORAL 3 TIMES DAILY
COMMUNITY

## 2025-02-11 NOTE — PATIENT INSTRUCTIONS
Consider vaccines at your pharmacy including Tetanus, Pneumonia, and COVID-19.    Good luck with [employer]. I will update you on results when available.

## 2025-02-23 NOTE — PROGRESS NOTES
Answers submitted by the patient for this visit:  Review of Systems Questionnaire (Submitted on 2/10/2025)  activity change: No  hearing loss: No  trouble swallowing: No  eye discharge: No  chest tightness: No  wheezing: No  chest pain: No  palpitations: No  constipation: No  vomiting: No  diarrhea: No  difficulty urinating: No  hematuria: No  headaches: No  weakness: No  dysphoric mood: Yes    Subjective:       Patient ID: Ruthy Ramires is a 44 y.o. male who presents for follow up. The patient's last visit with me was on 10/31/2024.    Chief Complaint: Follow-up    SUBSTANCE USE RECOVERY:  He recently completed inpatient alcohol rehabilitation from December 26th until last week. He is currently participating in a 10-week outpatient rehabilitation program under the care of a doctor and counselor. He received Vivitrol injection yesterday.    CURRENT MEDICATIONS:  He takes Propranolol 10mg 3 times daily, reflux medication 40mg daily, Hydrochlorothiazide 12.5mg daily, Vitamin D, and folic acid.    SPECIALIST CARE:  He has had recent visits with cardiology and hepatology. He requires follow up with dentistry for cleaning.    ENT:  He has nasal polyps and has not completed recommended CT.         Past Medical History:   Diagnosis Date    Alcohol abuse     Anxiety     Hiatal hernia     Panic attack        Past Surgical History:   Procedure Laterality Date    ESOPHAGOGASTRODUODENOSCOPY N/A 04/21/2020    Procedure: EGD (ESOPHAGOGASTRODUODENOSCOPY);  Surgeon: Aakash Lugo MD;  Location: [facility];  Service: Endoscopy;  Laterality: N/A;    NASAL ENDOSCOPY         Review of patient's allergies indicates:  No Known Allergies    Social History[1]    Medications Ordered Prior to Encounter[2]    Family History   Problem Relation Name Age of Onset    No Known Problems Mother      Nephrolithiasis Father      Cancer Maternal Grandmother  90        colon    Heart disease Neg Hx      Hypertension Neg Hx         Review of  Systems    Objective:      /80   Pulse 69   Wt 105.2 kg (231 lb 14.8 oz)   SpO2 98%   BMI 29.78 kg/m²   Physical Exam    Assessment:           Plan:       Alcohol abuse  - Stable. Status post Vivitrol injection yesterday. Continue outpatient rehab.    Transaminitis  - Anticipate upcoming hepatology appointment, Dr. Johnson.    Tachycardia  - Improved. Anticipate upcoming cardiology appointment, Dr. Hernandez.    Abnormal finding of nose  - Will need to reschedule for CT and ENT follow up with Dr. Martinez.    Anxiety and depression  - Stable. Continue outpatient rehab.        Follow up in about 10 weeks (around 4/22/2025).    This note was generated with the assistance of ambient listening technology. Verbal consent was obtained by the patient and accompanying visitor(s) for the recording of patient appointment to facilitate this note. I attest to having reviewed and edited the generated note for accuracy, though some syntax or spelling errors may persist. Please contact the author of this note for any clarification.           [1]   Social History  Socioeconomic History    Marital status:    Occupational History    Occupation: [employer]     Comment: [place]   Tobacco Use    Smoking status: Never    Smokeless tobacco: Never   Substance and Sexual Activity    Alcohol use: Not Currently    Drug use: Never    Sexual activity: Not Currently   Social History Narrative    Lives in [place] alone. He stays on the [facility] by his parents. No pets.     Social Drivers of Health     Financial Resource Strain: Low Risk  (2/10/2025)    Overall Financial Resource Strain (CARDIA)     Difficulty of Paying Living Expenses: Not hard at all   Food Insecurity: No Food Insecurity (2/10/2025)    Hunger Vital Sign     Worried About Running Out of Food in the Last Year: Never true     Ran Out of Food in the Last Year: Never true   Transportation Needs: No Transportation Needs (10/1/2024)    TRANSPORTATION NEEDS     Transportation  : No   Physical Activity: Insufficiently Active (2/10/2025)    Exercise Vital Sign     Days of Exercise per Week: 3 days     Minutes of Exercise per Session: 40 min   Stress: Stress Concern Present (2/10/2025)    [employer] [place] [employer] - Occupational Stress Questionnaire     Feeling of Stress : Rather much   Housing Stability: Unknown (2/10/2025)    Housing Stability Vital Sign     Unable to Pay for Housing in the Last Year: No     Homeless in the Last Year: No   [2]   Current Outpatient Medications on File Prior to Visit   Medication Sig Dispense Refill    cholecalciferol, vitamin D3, 125 mcg (5,000 unit) capsule Take 5,000 Units by mouth every morning.      folic acid (FOLVITE) 1 MG tablet Take 1,000 mcg by mouth.      hydroCHLOROthiazide (MICROZIDE) 12.5 mg capsule Take 12.5 mg by mouth.      multivitamin Tab Take 1 tablet by mouth every morning.      pantoprazole (PROTONIX) 40 MG tablet Take 1 tablet (40 mg total) by mouth once daily. 90 tablet 4    propranoloL (INDERAL) 10 MG tablet Take 10 mg by mouth 3 (three) times daily.      sertraline (ZOLOFT) 100 MG tablet Take 1 tablet (100 mg total) by mouth once daily. 90 tablet 0    thiamine 100 MG tablet Take 100 mg by mouth every morning.      VIVITROL 380 mg SSRR kit Inject into the muscle.       No current facility-administered medications on file prior to visit.

## 2025-02-28 ENCOUNTER — OFFICE VISIT (OUTPATIENT)
Dept: CARDIOLOGY | Facility: CLINIC | Age: 45
End: 2025-02-28
Payer: COMMERCIAL

## 2025-02-28 VITALS
BODY MASS INDEX: 29.45 KG/M2 | HEIGHT: 74 IN | DIASTOLIC BLOOD PRESSURE: 88 MMHG | SYSTOLIC BLOOD PRESSURE: 122 MMHG | HEART RATE: 79 BPM | WEIGHT: 229.5 LBS

## 2025-02-28 DIAGNOSIS — K70.10 ALCOHOLIC HEPATITIS WITHOUT ASCITES: ICD-10-CM

## 2025-02-28 DIAGNOSIS — F10.10 ALCOHOL ABUSE: Primary | ICD-10-CM

## 2025-02-28 DIAGNOSIS — F51.04 INSOMNIA, PSYCHOPHYSIOLOGICAL: ICD-10-CM

## 2025-02-28 DIAGNOSIS — R94.31 ABNORMAL EKG: ICD-10-CM

## 2025-02-28 PROCEDURE — 99999 PR PBB SHADOW E&M-EST. PATIENT-LVL III: CPT | Mod: PBBFAC,,, | Performed by: INTERNAL MEDICINE

## 2025-02-28 NOTE — PROGRESS NOTES
Subjective:    Patient ID:  Ruthy Ramires is a 44 y.o. male patient here for evaluation Follow-up (4 month)      History of Present Illness:  Follow-up sinus arrhythmia past noninvasive cardiac assessment via stress echo for ischemia structural heart issues chronotropic insufficiency.  Exercise-induced arrhythmia.  Patient remains on propranolol.    Cardiac review of systems otherwise unremarkable.  No definite angina dyspnea.  Occasional palpitations.  No syncope/presyncope.             Review of patient's allergies indicates:  No Known Allergies    Past Medical History:   Diagnosis Date    Alcohol abuse     Anxiety     Hiatal hernia     Panic attack      Past Surgical History:   Procedure Laterality Date    ESOPHAGOGASTRODUODENOSCOPY N/A 04/21/2020    Procedure: EGD (ESOPHAGOGASTRODUODENOSCOPY);  Surgeon: Aakash Lugo MD;  Location: [facility];  Service: Endoscopy;  Laterality: N/A;    NASAL ENDOSCOPY       Social History[1]     Review of Systems:    As noted in HPI in addition      REVIEW OF SYSTEMS  Review of Systems   Constitutional: Negative for decreased appetite, diaphoresis, night sweats, weight gain and weight loss.   HENT:  Negative for nosebleeds and odynophagia.    Eyes:  Negative for double vision and photophobia.   Cardiovascular:  Negative for chest pain, claudication, cyanosis, dyspnea on exertion, irregular heartbeat, leg swelling, near-syncope, orthopnea, palpitations, paroxysmal nocturnal dyspnea and syncope.   Respiratory:  Negative for cough, hemoptysis, shortness of breath and wheezing.    Hematologic/Lymphatic: Negative for adenopathy.   Skin:  Negative for flushing, skin cancer and suspicious lesions.   Musculoskeletal:  Negative for gout, myalgias and neck pain.   Gastrointestinal:  Negative for abdominal pain, heartburn, hematemesis and hematochezia.   Genitourinary:  Negative for bladder incontinence, hesitancy and nocturia.   Neurological:  Negative for focal weakness,  headaches, light-headedness and paresthesias.   Psychiatric/Behavioral:  Negative for memory loss and substance abuse.               Objective:        Vitals:    02/28/25 1051   BP: 122/88   Pulse: 79       Lab Results   Component Value Date    WBC 6.69 11/26/2024    HGB 17.0 11/26/2024    HCT 49.7 11/26/2024     (L) 11/26/2024    CHOL 177 05/14/2024    TRIG 97 05/14/2024    HDL 46 05/14/2024    ALT 88 (H) 11/26/2024     (H) 11/26/2024     11/26/2024    K 3.7 11/26/2024     11/26/2024    CREATININE 0.8 11/26/2024    BUN 9 11/26/2024    CO2 27 11/26/2024    TSH 0.337 (L) 09/29/2024    PSA 1.0 05/14/2024    INR 1.1 11/26/2024    HGBA1C 5.4 06/13/2024        ECHOCARDIOGRAM RESULTS  Results for orders placed during the hospital encounter of 06/21/24    Stress Echo Which stress agent will be used? Treadmill Exercise; Color Flow Doppler? No    Interpretation Summary    Left Ventricle: The left ventricle is normal in size. Normal wall thickness. There is normal systolic function with a visually estimated ejection fraction of 60 - 65%. There is normal diastolic function.    Right Ventricle: Normal right ventricular cavity size. Wall thickness is normal. Systolic function is normal.    Left Atrium: Left atrium is mildly dilated.    Stress Protocol: The patient exercised for 6 minutes 27 seconds on a high ramp protocol, corresponding to a functional capacity of 11METS, achieving a peak heart rate of 148 bpm, which is 85% of the age predicted maximum heart rate. The test was stopped because the patient experienced leg/hip pain.    Baseline ECG: The Baseline ECG reveals sinus rhythm. The ST segments are normal.    Stress ECG: There are no ST segment deviation identified during the protocol.    ECG Conclusion: The ECG portion of the study is negative for ischemia.    Post-stress    No results found for this or any previous visit.          CURRENT/PREVIOUS VISIT EKG  Results for orders placed or  performed during the hospital encounter of 09/29/24   EKG 12-lead    Collection Time: 09/29/24  8:18 AM   Result Value Ref Range    QRS Duration 74 ms    [facility]HS QTC Calculation 417 ms    Narrative    Test Reason : R00.0,    Vent. Rate : 132 BPM     Atrial Rate : 132 BPM     P-R Int : 160 ms          QRS Dur : 074 ms      QT Int : 282 ms       P-R-T Axes : 077 063 -01 degrees     QTc Int : 417 ms    Sinus tachycardia  T wave abnormality, consider anterolateral ischemia  Abnormal ECG  When compared with ECG of 29-SEP-2024 04:54,  ME interval has increased  T wave inversion more evident in Inferior leads  T wave inversion now evident in Anterior leads  Confirmed by Ernie Pierre MD ([id number]) on 10/1/2024 12:28:45 PM    Referred By: TARIQ   SELF           Confirmed By:Ernie Pierre MD     No valid procedures specified.   No results found for this or any previous visit.    No valid procedures specified.    PHYSICAL EXAM  GENERAL: well built, well nourished, well-developed in no apparent distress alert and oriented.   HEENT: Normocephalic. Pupils normal and conjunctivae normal.  Mucous membranes normal, no cyanosis or icterus, trachea central,no pallor or icterus is noted..   NECK: No JVD. No bruit..   THYROID: Thyroid not enlarged. No nodules present..   CARDIAC:  Normal S1-S2.  No murmur rub click or gallop.  PMI nondisplaced.    LUNGS: Clear to auscultation. No wheezing or rhonchi..   ABDOMEN: Soft no masses or organomegaly.  No abdomen pulsations or bruits.  Normal bowel sounds. No pulsations and no masses felt, No guarding or rebound.   URINARY: No brown catheter   EXTREMITIES: No cyanosis, clubbing or edema noted at this time., no calf tenderness bilaterally.   PERIPHERAL VASCULAR SYSTEM: Good palpable distal pulses.  2+ femoral, popliteal and pedal pulses.  No bruits    CENTRAL NERVOUS SYSTEM: No focal motor or sensory deficits noted.   SKIN: Skin without lesions, moist, well perfused.   MUSCLE STRENGTH & TONE: No  noteable weakness, atrophy or abnormal movement    I HAVE REVIEWED :    The vital signs, nurses notes, and all the pertinent radiology and labs.         Current Outpatient Medications   Medication Instructions    cholecalciferol (vitamin D3) 5,000 Units, Every morning    folic acid (FOLVITE) 1,000 mcg    hydroCHLOROthiazide (MICROZIDE) 12.5 mg    multivitamin Tab 1 tablet, Every morning    pantoprazole (PROTONIX) 40 mg, Oral, Daily    propranoloL (INDERAL) 10 mg, 3 times daily    sertraline (ZOLOFT) 100 mg, Oral, Daily    thiamine 100 mg, Every morning    VIVITROL 380 mg SSRR kit Inject into the muscle.          Assessment:   Sinus arrhythmia, palpitations stress echo for ischemia structural heart issues EF normal.  Exercise-induced arrhythmia.        Plan:   Meds reviewed and reconciled recommend no changes.  Labs primary care.  See me again yearly.          No follow-ups on file.            [1]   Social History  Tobacco Use    Smoking status: Never    Smokeless tobacco: Never   Substance Use Topics    Alcohol use: Not Currently    Drug use: Never

## 2025-03-18 ENCOUNTER — PATIENT MESSAGE (OUTPATIENT)
Dept: FAMILY MEDICINE | Facility: CLINIC | Age: 45
End: 2025-03-18
Payer: COMMERCIAL

## 2025-03-18 DIAGNOSIS — F33.1 MODERATE EPISODE OF RECURRENT MAJOR DEPRESSIVE DISORDER: ICD-10-CM

## 2025-03-18 DIAGNOSIS — F41.1 GAD (GENERALIZED ANXIETY DISORDER): ICD-10-CM

## 2025-03-20 RX ORDER — PANTOPRAZOLE SODIUM 40 MG/1
40 TABLET, DELAYED RELEASE ORAL DAILY
Qty: 90 TABLET | Refills: 3 | Status: SHIPPED | OUTPATIENT
Start: 2025-03-20 | End: 2026-03-20

## 2025-03-20 RX ORDER — SERTRALINE HYDROCHLORIDE 100 MG/1
100 TABLET, FILM COATED ORAL DAILY
Qty: 90 TABLET | Refills: 0 | Status: SHIPPED | OUTPATIENT
Start: 2025-03-20 | End: 2025-06-18

## 2025-04-01 ENCOUNTER — PATIENT MESSAGE (OUTPATIENT)
Dept: FAMILY MEDICINE | Facility: CLINIC | Age: 45
End: 2025-04-01
Payer: COMMERCIAL

## 2025-04-01 DIAGNOSIS — F41.9 ANXIETY: ICD-10-CM

## 2025-04-01 DIAGNOSIS — I10 PRIMARY HYPERTENSION: Primary | ICD-10-CM

## 2025-04-01 DIAGNOSIS — R00.0 TACHYCARDIA: ICD-10-CM

## 2025-04-01 RX ORDER — PROPRANOLOL HYDROCHLORIDE 10 MG/1
10 TABLET ORAL 3 TIMES DAILY PRN
Qty: 90 TABLET | Refills: 1 | Status: SHIPPED | OUTPATIENT
Start: 2025-04-01

## 2025-04-01 RX ORDER — HYDROCHLOROTHIAZIDE 12.5 MG/1
12.5 CAPSULE ORAL DAILY
Qty: 30 CAPSULE | Refills: 2 | Status: SHIPPED | OUTPATIENT
Start: 2025-04-01

## 2025-04-04 ENCOUNTER — PATIENT MESSAGE (OUTPATIENT)
Dept: FAMILY MEDICINE | Facility: CLINIC | Age: 45
End: 2025-04-04
Payer: COMMERCIAL

## 2025-04-04 DIAGNOSIS — F10.90 ALCOHOL USE DISORDER: ICD-10-CM

## 2025-04-04 DIAGNOSIS — R53.83 FATIGUE, UNSPECIFIED TYPE: Primary | ICD-10-CM

## 2025-04-04 NOTE — TELEPHONE ENCOUNTER
Pt. Confirmed rescheduled appt. With Dr. Jeffers on 4/24/25 at 9:00 AM and lab appt. On 4/11/25 at 9:40 AM

## 2025-04-11 ENCOUNTER — LAB VISIT (OUTPATIENT)
Dept: LAB | Facility: HOSPITAL | Age: 45
End: 2025-04-11
Attending: STUDENT IN AN ORGANIZED HEALTH CARE EDUCATION/TRAINING PROGRAM
Payer: COMMERCIAL

## 2025-04-11 DIAGNOSIS — R53.83 FATIGUE, UNSPECIFIED TYPE: ICD-10-CM

## 2025-04-11 DIAGNOSIS — F10.90 ALCOHOL USE DISORDER: ICD-10-CM

## 2025-04-11 LAB
ABSOLUTE EOSINOPHIL (OHS): 0.15 K/UL
ABSOLUTE MONOCYTE (OHS): 0.42 K/UL (ref 0.3–1)
ABSOLUTE NEUTROPHIL COUNT (OHS): 6.51 K/UL (ref 1.8–7.7)
ALBUMIN SERPL BCP-MCNC: 4.2 G/DL (ref 3.5–5.2)
ALP SERPL-CCNC: 113 UNIT/L (ref 40–150)
ALT SERPL W/O P-5'-P-CCNC: 26 UNIT/L (ref 10–44)
ANION GAP (OHS): 13 MMOL/L (ref 8–16)
AST SERPL-CCNC: 28 UNIT/L (ref 11–45)
BASOPHILS # BLD AUTO: 0.05 K/UL
BASOPHILS NFR BLD AUTO: 0.5 %
BILIRUB SERPL-MCNC: 1.4 MG/DL (ref 0.1–1)
BUN SERPL-MCNC: 15 MG/DL (ref 6–20)
CALCIUM SERPL-MCNC: 9.4 MG/DL (ref 8.7–10.5)
CHLORIDE SERPL-SCNC: 100 MMOL/L (ref 95–110)
CO2 SERPL-SCNC: 26 MMOL/L (ref 23–29)
CREAT SERPL-MCNC: 0.9 MG/DL (ref 0.5–1.4)
ERYTHROCYTE [DISTWIDTH] IN BLOOD BY AUTOMATED COUNT: 13.4 % (ref 11.5–14.5)
FERRITIN SERPL-MCNC: 128 NG/ML (ref 20–300)
FOLATE SERPL-MCNC: 12.5 NG/ML (ref 4–24)
GFR SERPLBLD CREATININE-BSD FMLA CKD-EPI: >60 ML/MIN/1.73/M2
GLUCOSE SERPL-MCNC: 101 MG/DL (ref 70–110)
HCT VFR BLD AUTO: 49.8 % (ref 40–54)
HGB BLD-MCNC: 16.4 GM/DL (ref 14–18)
IMM GRANULOCYTES # BLD AUTO: 0.02 K/UL (ref 0–0.04)
IMM GRANULOCYTES NFR BLD AUTO: 0.2 % (ref 0–0.5)
IRON SATN MFR SERPL: 23 % (ref 20–50)
IRON SERPL-MCNC: 89 UG/DL (ref 45–160)
LYMPHOCYTES # BLD AUTO: 2.04 K/UL (ref 1–4.8)
MCH RBC QN AUTO: 29.4 PG (ref 27–31)
MCHC RBC AUTO-ENTMCNC: 32.9 G/DL (ref 32–36)
MCV RBC AUTO: 89 FL (ref 82–98)
NUCLEATED RBC (/100WBC) (OHS): 0 /100 WBC
PLATELET # BLD AUTO: 217 K/UL (ref 150–450)
PMV BLD AUTO: 11.3 FL (ref 9.2–12.9)
POTASSIUM SERPL-SCNC: 3.7 MMOL/L (ref 3.5–5.1)
PROT SERPL-MCNC: 8.1 GM/DL (ref 6–8.4)
RBC # BLD AUTO: 5.58 M/UL (ref 4.6–6.2)
RELATIVE EOSINOPHIL (OHS): 1.6 %
RELATIVE LYMPHOCYTE (OHS): 22.2 % (ref 18–48)
RELATIVE MONOCYTE (OHS): 4.6 % (ref 4–15)
RELATIVE NEUTROPHIL (OHS): 70.9 % (ref 38–73)
SODIUM SERPL-SCNC: 139 MMOL/L (ref 136–145)
TESTOST SERPL-MCNC: 851 NG/DL (ref 304–1227)
TIBC SERPL-MCNC: 394 UG/DL (ref 250–450)
TRANSFERRIN SERPL-MCNC: 266 MG/DL (ref 200–375)
TSH SERPL-ACNC: 1.43 UIU/ML (ref 0.4–4)
WBC # BLD AUTO: 9.19 K/UL (ref 3.9–12.7)

## 2025-04-11 PROCEDURE — 36415 COLL VENOUS BLD VENIPUNCTURE: CPT | Mod: PO

## 2025-04-11 PROCEDURE — 82607 VITAMIN B-12: CPT

## 2025-04-11 PROCEDURE — 82728 ASSAY OF FERRITIN: CPT

## 2025-04-11 PROCEDURE — 85025 COMPLETE CBC W/AUTO DIFF WBC: CPT

## 2025-04-11 PROCEDURE — 84425 ASSAY OF VITAMIN B-1: CPT

## 2025-04-11 PROCEDURE — 84403 ASSAY OF TOTAL TESTOSTERONE: CPT

## 2025-04-11 PROCEDURE — 83540 ASSAY OF IRON: CPT

## 2025-04-11 PROCEDURE — 82746 ASSAY OF FOLIC ACID SERUM: CPT

## 2025-04-11 PROCEDURE — 80053 COMPREHEN METABOLIC PANEL: CPT

## 2025-04-11 PROCEDURE — 84443 ASSAY THYROID STIM HORMONE: CPT

## 2025-04-14 ENCOUNTER — RESULTS FOLLOW-UP (OUTPATIENT)
Dept: FAMILY MEDICINE | Facility: CLINIC | Age: 45
End: 2025-04-14

## 2025-04-14 LAB — VIT B12 SERPL-MCNC: 441 NG/L (ref 180–914)

## 2025-04-16 LAB — W VITAMIN B1: 94 UG/L

## 2025-04-24 ENCOUNTER — LAB VISIT (OUTPATIENT)
Dept: LAB | Facility: HOSPITAL | Age: 45
End: 2025-04-24
Attending: STUDENT IN AN ORGANIZED HEALTH CARE EDUCATION/TRAINING PROGRAM
Payer: COMMERCIAL

## 2025-04-24 ENCOUNTER — OFFICE VISIT (OUTPATIENT)
Dept: FAMILY MEDICINE | Facility: CLINIC | Age: 45
End: 2025-04-24
Payer: COMMERCIAL

## 2025-04-24 VITALS
WEIGHT: 224 LBS | SYSTOLIC BLOOD PRESSURE: 122 MMHG | OXYGEN SATURATION: 96 % | BODY MASS INDEX: 28.75 KG/M2 | HEIGHT: 74 IN | HEART RATE: 88 BPM | DIASTOLIC BLOOD PRESSURE: 86 MMHG

## 2025-04-24 DIAGNOSIS — R53.83 OTHER FATIGUE: ICD-10-CM

## 2025-04-24 DIAGNOSIS — R11.0 NAUSEA: ICD-10-CM

## 2025-04-24 DIAGNOSIS — F10.90 ALCOHOL USE DISORDER: Primary | ICD-10-CM

## 2025-04-24 DIAGNOSIS — L98.9 SKIN LESION: ICD-10-CM

## 2025-04-24 LAB — 25(OH)D3+25(OH)D2 SERPL-MCNC: 45 NG/ML (ref 30–96)

## 2025-04-24 PROCEDURE — 99214 OFFICE O/P EST MOD 30 MIN: CPT | Mod: S$GLB,,, | Performed by: STUDENT IN AN ORGANIZED HEALTH CARE EDUCATION/TRAINING PROGRAM

## 2025-04-24 PROCEDURE — G2211 COMPLEX E/M VISIT ADD ON: HCPCS | Mod: S$GLB,,, | Performed by: STUDENT IN AN ORGANIZED HEALTH CARE EDUCATION/TRAINING PROGRAM

## 2025-04-24 PROCEDURE — 82306 VITAMIN D 25 HYDROXY: CPT

## 2025-04-24 PROCEDURE — 3074F SYST BP LT 130 MM HG: CPT | Mod: CPTII,S$GLB,, | Performed by: STUDENT IN AN ORGANIZED HEALTH CARE EDUCATION/TRAINING PROGRAM

## 2025-04-24 PROCEDURE — 3008F BODY MASS INDEX DOCD: CPT | Mod: CPTII,S$GLB,, | Performed by: STUDENT IN AN ORGANIZED HEALTH CARE EDUCATION/TRAINING PROGRAM

## 2025-04-24 PROCEDURE — 99999 PR PBB SHADOW E&M-EST. PATIENT-LVL V: CPT | Mod: PBBFAC,,, | Performed by: STUDENT IN AN ORGANIZED HEALTH CARE EDUCATION/TRAINING PROGRAM

## 2025-04-24 PROCEDURE — 1159F MED LIST DOCD IN RCRD: CPT | Mod: CPTII,S$GLB,, | Performed by: STUDENT IN AN ORGANIZED HEALTH CARE EDUCATION/TRAINING PROGRAM

## 2025-04-24 PROCEDURE — 3079F DIAST BP 80-89 MM HG: CPT | Mod: CPTII,S$GLB,, | Performed by: STUDENT IN AN ORGANIZED HEALTH CARE EDUCATION/TRAINING PROGRAM

## 2025-04-24 PROCEDURE — 1160F RVW MEDS BY RX/DR IN RCRD: CPT | Mod: CPTII,S$GLB,, | Performed by: STUDENT IN AN ORGANIZED HEALTH CARE EDUCATION/TRAINING PROGRAM

## 2025-04-24 PROCEDURE — 36415 COLL VENOUS BLD VENIPUNCTURE: CPT | Mod: PO

## 2025-04-24 RX ORDER — ONDANSETRON 4 MG/1
4 TABLET, ORALLY DISINTEGRATING ORAL 2 TIMES DAILY PRN
Qty: 15 TABLET | Refills: 0 | Status: SHIPPED | OUTPATIENT
Start: 2025-04-24

## 2025-04-24 RX ORDER — LANOLIN ALCOHOL/MO/W.PET/CERES
400 CREAM (GRAM) TOPICAL NIGHTLY PRN
COMMUNITY

## 2025-04-24 NOTE — PROGRESS NOTES
"Subjective:       Patient ID: Ruthy Ramires is a 44 y.o. male.    Chief Complaint: Follow-up (10 wk )    HPI  44 year old male presents for follow up. He has just completed outpatient rehab last week and now has more time for activity like golf and tennis - he is planning on going back to the gym. Sleep has improved. However, he has been having nighttime and morning nausea that sometimes wakes him from sleep. Labs were unremarkable aside from improved elevated total bilirubin. We did not check vitamin D. He believes nausea came on after leaving rehab where he was initially started on vivitrol but as it was not covered by insurance he was switched to naltrexone. He is amenable to referral to Dr. Pena until he can make sure he is covered for refills through new program he started this week called [facility]. He continues protonix and denies reflux symptoms on it. No vomiting. He also requests referral to Dermatology for mole on upper left chest and back.    Normal neurologic exam    Past Medical History:   Diagnosis Date    Alcohol abuse     Anxiety     Hiatal hernia     Panic attack        Past Surgical History:   Procedure Laterality Date    ESOPHAGOGASTRODUODENOSCOPY N/A 04/21/2020    Procedure: EGD (ESOPHAGOGASTRODUODENOSCOPY);  Surgeon: Aakash Lugo MD;  Location[facility];  Service: Endoscopy;  Laterality: N/A;    NASAL ENDOSCOPY         Review of patient's allergies indicates:  No Known Allergies    Social History[1]    Medications Ordered Prior to Encounter[2]    Family History   Problem Relation Name Age of Onset    No Known Problems Mother      Nephrolithiasis Father      Cancer Maternal Grandmother  90        colon    Heart disease Neg Hx      Hypertension Neg Hx         Review of Systems      Objective:      /86   Pulse 88   Ht 6' 2" (1.88 m)   Wt 101.6 kg (223 lb 15.8 oz)   SpO2 96%   BMI 28.76 kg/m²   Physical Exam  Constitutional:       General: He is not in acute distress.   "   Appearance: He is not ill-appearing or toxic-appearing.   HENT:      Mouth/Throat:      Mouth: Mucous membranes are moist.      Pharynx: Oropharynx is clear. No oropharyngeal exudate.   Eyes:      Conjunctiva/sclera: Conjunctivae normal.      Pupils: Pupils are equal, round, and reactive to light.   Cardiovascular:      Rate and Rhythm: Normal rate and regular rhythm.   Pulmonary:      Effort: Pulmonary effort is normal.      Breath sounds: Normal breath sounds.   Abdominal:      General: Bowel sounds are normal.      Palpations: Abdomen is soft. There is no mass.      Tenderness: There is no abdominal tenderness.   Musculoskeletal:      Right lower leg: No edema.      Left lower leg: No edema.   Skin:     General: Skin is warm and dry.      Findings: Lesion present.   Neurological:      General: No focal deficit present.      Mental Status: He is alert. Mental status is at baseline.      Sensory: No sensory deficit.      Motor: No weakness.      Deep Tendon Reflexes: Reflexes normal.      Comments: PERRL, palate rise symmetric, shoulder shrug strong.   Psychiatric:         Mood and Affect: Mood normal.         Behavior: Behavior normal.         Assessment:       1. Alcohol use disorder    2. Other fatigue    3. Nausea    4. Skin lesion        Plan:       Alcohol use disorder  -     Ambulatory referral/consult to Addiction Psychiatry; Future; Expected date: 05/01/2025  - Starting [facility].  - Finished outpatient rehab.  - On naltrexone. Will let me know before he runs out if not able to fill with [facility] and/or referral to Dr. Pena to establish.    Other fatigue  -     Vitamin D; Future; Expected date: 04/24/2025  - Sleep has improved.    Nausea  -     ondansetron (ZOFRAN-ODT) 4 MG TbDL; Take 1 tablet (4 mg total) by mouth 2 (two) times daily as needed (nausea).  Dispense: 15 tablet; Refill: 0  - May be medication adverse effect. Symptomatic treatment with zofran.    Skin lesion  -     Ambulatory  referral/consult to Dermatology; Future; Expected date: 05/01/2025  - Left upper chest with dark brown, raised focal lesion with stuck on appearance.      Return in three months or sooner if needed.    Visit today included increased complexity associated with the care of the episodic problem skin lesion addressed and managing the longitudinal care of the patient due to the serious and/or complex managed problem(s) alcohol use disorder.         [1]   Social History  Socioeconomic History    Marital status:    Occupational History    Occupation: [employer]     Comment: [employer]   Tobacco Use    Smoking status: Never    Smokeless tobacco: Never   Substance and Sexual Activity    Alcohol use: Not Currently    Drug use: Never    Sexual activity: Not Currently   Social History Narrative    Lives in [place] alone. He stays on the [facility] by his parents. No pets.     Social Drivers of Health     Financial Resource Strain: Low Risk  (2/10/2025)    Overall Financial Resource Strain (CARDIA)     Difficulty of Paying Living Expenses: Not hard at all   Food Insecurity: No Food Insecurity (2/10/2025)    Hunger Vital Sign     Worried About Running Out of Food in the Last Year: Never true     Ran Out of Food in the Last Year: Never true   Transportation Needs: No Transportation Needs (10/1/2024)    TRANSPORTATION NEEDS     Transportation : No   Physical Activity: Insufficiently Active (2/10/2025)    Exercise Vital Sign     Days of Exercise per Week: 3 days     Minutes of Exercise per Session: 40 min   Stress: Stress Concern Present (2/10/2025)    [employer] [place] [employer] - Occupational Stress Questionnaire     Feeling of Stress : Rather much   Housing Stability: Unknown (2/10/2025)    Housing Stability Vital Sign     Unable to Pay for Housing in the Last Year: No     Homeless in the Last Year: No   [2]   Current Outpatient Medications on File Prior to Visit   Medication Sig Dispense Refill     hydroCHLOROthiazide (MICROZIDE) 12.5 mg capsule Take 1 capsule (12.5 mg total) by mouth once daily. 30 capsule 2    magnesium oxide (MAG-OX) 400 mg (241.3 mg magnesium) tablet Take 400 mg by mouth nightly as needed.      naltrexone 1.5 mg Cap       pantoprazole (PROTONIX) 40 MG tablet Take 1 tablet (40 mg total) by mouth once daily. 90 tablet 3    propranoloL (INDERAL) 10 MG tablet Take 1 tablet (10 mg total) by mouth 3 (three) times daily as needed (palpitations). 90 tablet 1    sertraline (ZOLOFT) 100 MG tablet Take 1 tablet (100 mg total) by mouth once daily. 90 tablet 0    cholecalciferol, vitamin D3, 125 mcg (5,000 unit) capsule Take 5,000 Units by mouth every morning.      folic acid (FOLVITE) 1 MG tablet Take 1,000 mcg by mouth.      multivitamin Tab Take 1 tablet by mouth every morning.      thiamine 100 MG tablet Take 100 mg by mouth every morning.      [DISCONTINUED] VIVITROL 380 mg SSRR kit Inject into the muscle.       No current facility-administered medications on file prior to visit.

## 2025-05-01 ENCOUNTER — RESULTS FOLLOW-UP (OUTPATIENT)
Dept: FAMILY MEDICINE | Facility: CLINIC | Age: 45
End: 2025-05-01

## 2025-07-02 ENCOUNTER — PATIENT MESSAGE (OUTPATIENT)
Dept: FAMILY MEDICINE | Facility: CLINIC | Age: 45
End: 2025-07-02
Payer: COMMERCIAL

## 2025-07-07 ENCOUNTER — OFFICE VISIT (OUTPATIENT)
Dept: FAMILY MEDICINE | Facility: CLINIC | Age: 45
End: 2025-07-07
Payer: COMMERCIAL

## 2025-07-07 VITALS
WEIGHT: 226.19 LBS | HEART RATE: 80 BPM | OXYGEN SATURATION: 97 % | DIASTOLIC BLOOD PRESSURE: 88 MMHG | SYSTOLIC BLOOD PRESSURE: 120 MMHG | HEIGHT: 74 IN | BODY MASS INDEX: 29.03 KG/M2

## 2025-07-07 DIAGNOSIS — R10.30 LOWER ABDOMINAL PAIN: Primary | ICD-10-CM

## 2025-07-07 DIAGNOSIS — R00.0 TACHYCARDIA: ICD-10-CM

## 2025-07-07 DIAGNOSIS — I10 PRIMARY HYPERTENSION: ICD-10-CM

## 2025-07-07 DIAGNOSIS — R14.0 ABDOMINAL BLOATING: ICD-10-CM

## 2025-07-07 DIAGNOSIS — F41.9 ANXIETY: ICD-10-CM

## 2025-07-07 PROCEDURE — 1160F RVW MEDS BY RX/DR IN RCRD: CPT | Mod: CPTII,S$GLB,, | Performed by: FAMILY MEDICINE

## 2025-07-07 PROCEDURE — 99999 PR PBB SHADOW E&M-EST. PATIENT-LVL III: CPT | Mod: PBBFAC,,, | Performed by: FAMILY MEDICINE

## 2025-07-07 PROCEDURE — 99214 OFFICE O/P EST MOD 30 MIN: CPT | Mod: S$GLB,,, | Performed by: FAMILY MEDICINE

## 2025-07-07 PROCEDURE — 3008F BODY MASS INDEX DOCD: CPT | Mod: CPTII,S$GLB,, | Performed by: FAMILY MEDICINE

## 2025-07-07 PROCEDURE — 1159F MED LIST DOCD IN RCRD: CPT | Mod: CPTII,S$GLB,, | Performed by: FAMILY MEDICINE

## 2025-07-07 PROCEDURE — 3074F SYST BP LT 130 MM HG: CPT | Mod: CPTII,S$GLB,, | Performed by: FAMILY MEDICINE

## 2025-07-07 PROCEDURE — 3079F DIAST BP 80-89 MM HG: CPT | Mod: CPTII,S$GLB,, | Performed by: FAMILY MEDICINE

## 2025-07-07 RX ORDER — PROPRANOLOL HYDROCHLORIDE 10 MG/1
10 TABLET ORAL 3 TIMES DAILY PRN
Qty: 90 TABLET | Refills: 1 | Status: SHIPPED | OUTPATIENT
Start: 2025-07-07

## 2025-07-07 RX ORDER — HYDROCHLOROTHIAZIDE 12.5 MG/1
12.5 CAPSULE ORAL DAILY
Qty: 90 CAPSULE | Refills: 1 | Status: SHIPPED | OUTPATIENT
Start: 2025-07-07

## 2025-07-07 NOTE — PROGRESS NOTES
Subjective:       Patient ID: Ruthy Ramires is a 44 y.o. male.    Chief Complaint: GI Problem    This pt is new to me.   The pt reports years of lots of gas, gnawing pain of the lower abdomen, feeling of fullnss.  Food does not seem to make a difference.  He has had no changes in Bms.  He has some mild nausea without vomiting in the mornings.  His acid reflux is well controlled with Protonix.  He has a hx of alcoholic hepatitis and gastritis.  He has not had a colonoscopy--his grandmother had colon cancer in her 90s.  He reports recent fatigue.  Recent labs including liver enzymes, CBC, iron, ferritin and testosterone are good.  He does not sleep great but he does not think he feels any better after good sleep.  He has no trouble staying awake during the day.   He does not feel depressed nor anxious--he has felt those in the past and he does not feel that way now.      GI Problem      Review of Systems    Objective:      Physical Exam  Vitals and nursing note reviewed.   Constitutional:       Appearance: He is well-developed.   HENT:      Head: Normocephalic.   Eyes:      Conjunctiva/sclera: Conjunctivae normal.      Pupils: Pupils are equal, round, and reactive to light.   Neck:      Thyroid: No thyromegaly.   Cardiovascular:      Rate and Rhythm: Normal rate and regular rhythm.      Heart sounds: Normal heart sounds.   Pulmonary:      Effort: Pulmonary effort is normal.      Breath sounds: Normal breath sounds.   Abdominal:      General: Bowel sounds are normal.      Palpations: Abdomen is soft.      Tenderness: There is no abdominal tenderness.   Musculoskeletal:         General: No tenderness or deformity. Normal range of motion.      Cervical back: Normal range of motion and neck supple.   Lymphadenopathy:      Cervical: No cervical adenopathy.   Skin:     General: Skin is warm and dry.   Neurological:      Mental Status: He is alert and oriented to person, place, and time.      Cranial Nerves: No  cranial nerve deficit.      Motor: No abnormal muscle tone.      Coordination: Coordination normal.      Deep Tendon Reflexes: Reflexes normal.   Psychiatric:         Behavior: Behavior normal.         Assessment:       1. Lower abdominal pain    2. Abdominal bloating    3. Primary hypertension    4. Tachycardia    5. Anxiety        Plan:       Ruthy was seen today for gi problem.    Diagnoses and all orders for this visit:    Lower abdominal pain  -     Ambulatory referral/consult to Gastroenterology; Future  -     Pancreatic elastase, fecal; Future  -     Pancreatic elastase, fecal    Abdominal bloating  Comments:  Start a daily probiotic  Orders:  -     Ambulatory referral/consult to Gastroenterology; Future  -     Pancreatic elastase, fecal; Future  -     Pancreatic elastase, fecal    Primary hypertension  -     hydroCHLOROthiazide (MICROZIDE) 12.5 mg capsule; Take 1 capsule (12.5 mg total) by mouth once daily.    Tachycardia  -     propranoloL (INDERAL) 10 MG tablet; Take 1 tablet (10 mg total) by mouth 3 (three) times daily as needed (palpitations).    Anxiety  -     propranoloL (INDERAL) 10 MG tablet; Take 1 tablet (10 mg total) by mouth 3 (three) times daily as needed (palpitations).      During this visit, I reviewed the pt's history, medications, allergies, and problem list.

## 2025-07-31 DIAGNOSIS — F41.9 ANXIETY AND DEPRESSION: ICD-10-CM

## 2025-07-31 DIAGNOSIS — F32.A ANXIETY AND DEPRESSION: ICD-10-CM

## 2025-07-31 DIAGNOSIS — R00.0 TACHYCARDIA: Primary | ICD-10-CM

## 2025-08-21 ENCOUNTER — PATIENT MESSAGE (OUTPATIENT)
Dept: ADMINISTRATIVE | Facility: HOSPITAL | Age: 45
End: 2025-08-21
Payer: COMMERCIAL